# Patient Record
Sex: MALE | Race: WHITE | NOT HISPANIC OR LATINO | Employment: FULL TIME | URBAN - METROPOLITAN AREA
[De-identification: names, ages, dates, MRNs, and addresses within clinical notes are randomized per-mention and may not be internally consistent; named-entity substitution may affect disease eponyms.]

---

## 2017-01-27 ENCOUNTER — HOSPITAL ENCOUNTER (EMERGENCY)
Facility: HOSPITAL | Age: 47
Discharge: HOME | End: 2017-01-28
Attending: EMERGENCY MEDICINE | Admitting: EMERGENCY MEDICINE
Payer: COMMERCIAL

## 2017-01-27 DIAGNOSIS — R10.9 FLANK PAIN: Primary | ICD-10-CM

## 2017-01-27 PROCEDURE — 81002 URINALYSIS NONAUTO W/O SCOPE: CPT | Performed by: EMERGENCY MEDICINE

## 2017-01-27 RX ORDER — SIMVASTATIN 40 MG
40 TABLET ORAL
COMMUNITY
End: 2021-01-29 | Stop reason: SDUPTHER

## 2017-01-27 RX ORDER — KETOROLAC TROMETHAMINE 30 MG/ML
30 INJECTION, SOLUTION INTRAMUSCULAR; INTRAVENOUS ONCE
Status: COMPLETED | OUTPATIENT
Start: 2017-01-28 | End: 2017-01-28

## 2017-01-27 RX ORDER — CHOLECALCIFEROL (VITAMIN D3) 125 MCG
1 CAPSULE ORAL DAILY
COMMUNITY

## 2017-01-27 RX ORDER — ALPRAZOLAM 0.5 MG/1
0.5 TABLET ORAL AS NEEDED
COMMUNITY
End: 2018-04-26

## 2017-01-27 RX ORDER — SERTRALINE HYDROCHLORIDE 100 MG/1
200 TABLET, FILM COATED ORAL DAILY
COMMUNITY
End: 2018-04-26

## 2017-01-28 ENCOUNTER — APPOINTMENT (EMERGENCY)
Dept: RADIOLOGY | Facility: HOSPITAL | Age: 47
End: 2017-01-28
Payer: COMMERCIAL

## 2017-01-28 VITALS
TEMPERATURE: 98 F | HEART RATE: 86 BPM | SYSTOLIC BLOOD PRESSURE: 147 MMHG | OXYGEN SATURATION: 96 % | WEIGHT: 240 LBS | DIASTOLIC BLOOD PRESSURE: 87 MMHG | RESPIRATION RATE: 18 BRPM

## 2017-01-28 LAB
BACTERIA UR QL AUTO: ABNORMAL /HPF
BILIRUB UR QL STRIP: NEGATIVE
CLARITY UR: ABNORMAL
COLOR UR: YELLOW
COLOR, POC: YELLOW
GLUCOSE UR STRIP-MCNC: ABNORMAL MG/DL
HGB UR QL STRIP.AUTO: ABNORMAL
HYALINE CASTS #/AREA URNS LPF: ABNORMAL /LPF
KETONES UR STRIP-MCNC: NEGATIVE MG/DL
LEUKOCYTE ESTERASE UR QL STRIP: NEGATIVE
NITRITE UR QL STRIP: NEGATIVE
NON-SQ EPI CELLS URNS QL MICRO: ABNORMAL /HPF
PH UR STRIP.AUTO: 6.5 [PH] (ref 4.5–8)
PROT UR STRIP-MCNC: ABNORMAL MG/DL
RBC #/AREA URNS AUTO: ABNORMAL /HPF
SP GR UR STRIP.AUTO: 1.02 (ref 1–1.03)
UROBILINOGEN UR QL STRIP.AUTO: 0.2 E.U./DL
WBC #/AREA URNS AUTO: ABNORMAL /HPF

## 2017-01-28 PROCEDURE — 74176 CT ABD & PELVIS W/O CONTRAST: CPT

## 2017-01-28 PROCEDURE — 96372 THER/PROPH/DIAG INJ SC/IM: CPT

## 2017-01-28 PROCEDURE — 99284 EMERGENCY DEPT VISIT MOD MDM: CPT

## 2017-01-28 PROCEDURE — 81001 URINALYSIS AUTO W/SCOPE: CPT

## 2017-01-28 PROCEDURE — 87086 URINE CULTURE/COLONY COUNT: CPT

## 2017-01-28 RX ORDER — HYDROCODONE BITARTRATE AND ACETAMINOPHEN 5; 325 MG/1; MG/1
1 TABLET ORAL EVERY 6 HOURS PRN
Qty: 15 TABLET | Refills: 0 | Status: SHIPPED | OUTPATIENT
Start: 2017-01-28 | End: 2017-02-12

## 2017-01-28 RX ADMIN — KETOROLAC TROMETHAMINE 30 MG: 30 INJECTION, SOLUTION INTRAMUSCULAR at 00:05

## 2017-01-29 LAB — BACTERIA UR CULT: NORMAL

## 2017-02-01 ENCOUNTER — ALLSCRIPTS OFFICE VISIT (OUTPATIENT)
Dept: OTHER | Facility: OTHER | Age: 47
End: 2017-02-01

## 2017-03-20 ENCOUNTER — LAB REQUISITION (OUTPATIENT)
Dept: LAB | Facility: HOSPITAL | Age: 47
End: 2017-03-20
Payer: COMMERCIAL

## 2017-03-20 ENCOUNTER — HOSPITAL ENCOUNTER (OUTPATIENT)
Dept: RADIOLOGY | Facility: HOSPITAL | Age: 47
Discharge: HOME | End: 2017-03-20
Attending: UROLOGY
Payer: COMMERCIAL

## 2017-03-20 ENCOUNTER — ALLSCRIPTS OFFICE VISIT (OUTPATIENT)
Dept: OTHER | Facility: OTHER | Age: 47
End: 2017-03-20

## 2017-03-20 ENCOUNTER — TRANSCRIBE ORDERS (OUTPATIENT)
Dept: ADMINISTRATIVE | Facility: HOSPITAL | Age: 47
End: 2017-03-20

## 2017-03-20 DIAGNOSIS — R10.9 ABDOMINAL PAIN: ICD-10-CM

## 2017-03-20 DIAGNOSIS — N20.0 CALCULUS OF KIDNEY: ICD-10-CM

## 2017-03-20 LAB
CLARITY UR: NORMAL
COLOR UR: YELLOW
GLUCOSE (HISTORICAL): NORMAL
HGB UR QL STRIP.AUTO: NORMAL
KETONES UR STRIP-MCNC: NORMAL MG/DL
LEUKOCYTE ESTERASE UR QL STRIP: NORMAL
NITRITE UR QL STRIP: NORMAL
PH UR STRIP.AUTO: 6 [PH]
PROT UR STRIP-MCNC: NORMAL MG/DL
SP GR UR STRIP.AUTO: 1.03

## 2017-03-20 PROCEDURE — 87086 URINE CULTURE/COLONY COUNT: CPT | Performed by: UROLOGY

## 2017-03-20 PROCEDURE — 74000 HB X-RAY EXAM OF ABDOMEN (SINGLE ANTEROPOSTERIOR VIEW): CPT

## 2017-03-21 LAB — BACTERIA UR CULT: NORMAL

## 2017-04-08 ENCOUNTER — HOSPITAL ENCOUNTER (OUTPATIENT)
Dept: RADIOLOGY | Facility: HOSPITAL | Age: 47
Discharge: HOME | End: 2017-04-08
Attending: UROLOGY
Payer: COMMERCIAL

## 2017-04-08 DIAGNOSIS — N20.0 CALCULUS OF KIDNEY: ICD-10-CM

## 2017-04-08 PROCEDURE — 74000 HB X-RAY EXAM OF ABDOMEN (SINGLE ANTEROPOSTERIOR VIEW): CPT

## 2017-04-11 ENCOUNTER — GENERIC CONVERSION - ENCOUNTER (OUTPATIENT)
Dept: OTHER | Facility: OTHER | Age: 47
End: 2017-04-11

## 2017-04-29 ENCOUNTER — TRANSCRIBE ORDERS (OUTPATIENT)
Dept: LAB | Facility: HOSPITAL | Age: 47
End: 2017-04-29

## 2017-04-29 ENCOUNTER — APPOINTMENT (OUTPATIENT)
Dept: LAB | Facility: HOSPITAL | Age: 47
End: 2017-04-29
Attending: UROLOGY
Payer: COMMERCIAL

## 2017-04-29 DIAGNOSIS — R10.9 ABDOMINAL PAIN: ICD-10-CM

## 2017-04-29 DIAGNOSIS — N20.0 CALCULUS OF KIDNEY: ICD-10-CM

## 2017-04-29 LAB
ANION GAP SERPL CALCULATED.3IONS-SCNC: 8 MMOL/L (ref 4–13)
BASOPHILS # BLD AUTO: 0.03 THOUSANDS/ΜL (ref 0–0.1)
BASOPHILS NFR BLD AUTO: 1 % (ref 0–1)
BUN SERPL-MCNC: 18 MG/DL (ref 5–25)
CALCIUM SERPL-MCNC: 8.7 MG/DL (ref 8.3–10.1)
CHLORIDE SERPL-SCNC: 101 MMOL/L (ref 100–108)
CO2 SERPL-SCNC: 28 MMOL/L (ref 21–32)
CREAT SERPL-MCNC: 0.94 MG/DL (ref 0.6–1.3)
EOSINOPHIL # BLD AUTO: 0.13 THOUSAND/ΜL (ref 0–0.61)
EOSINOPHIL NFR BLD AUTO: 2 % (ref 0–6)
ERYTHROCYTE [DISTWIDTH] IN BLOOD BY AUTOMATED COUNT: 12.8 % (ref 11.6–15.1)
EST. AVERAGE GLUCOSE BLD GHB EST-MCNC: 203 MG/DL
GFR SERPL CREATININE-BSD FRML MDRD: >60 ML/MIN/1.73SQ M
GLUCOSE SERPL-MCNC: 224 MG/DL (ref 65–140)
HBA1C MFR BLD: 8.7 % (ref 4.2–6.3)
HCT VFR BLD AUTO: 37.9 % (ref 36.5–49.3)
HGB BLD-MCNC: 14.1 G/DL (ref 12–17)
LYMPHOCYTES # BLD AUTO: 2.16 THOUSANDS/ΜL (ref 0.6–4.47)
LYMPHOCYTES NFR BLD AUTO: 33 % (ref 14–44)
MCH RBC QN AUTO: 31.7 PG (ref 26.8–34.3)
MCHC RBC AUTO-ENTMCNC: 37.2 G/DL (ref 31.4–37.4)
MCV RBC AUTO: 85 FL (ref 82–98)
MONOCYTES # BLD AUTO: 0.53 THOUSAND/ΜL (ref 0.17–1.22)
MONOCYTES NFR BLD AUTO: 8 % (ref 4–12)
NEUTROPHILS # BLD AUTO: 3.64 THOUSANDS/ΜL (ref 1.85–7.62)
NEUTS SEG NFR BLD AUTO: 56 % (ref 43–75)
NRBC BLD AUTO-RTO: 0 /100 WBCS
PLATELET # BLD AUTO: 213 THOUSANDS/UL (ref 149–390)
PMV BLD AUTO: 10.7 FL (ref 8.9–12.7)
POTASSIUM SERPL-SCNC: 3.4 MMOL/L (ref 3.5–5.3)
RBC # BLD AUTO: 4.45 MILLION/UL (ref 3.88–5.62)
SODIUM SERPL-SCNC: 137 MMOL/L (ref 136–145)
WBC # BLD AUTO: 6.51 THOUSAND/UL (ref 4.31–10.16)

## 2017-04-29 PROCEDURE — 87086 URINE CULTURE/COLONY COUNT: CPT

## 2017-04-29 PROCEDURE — 80048 BASIC METABOLIC PNL TOTAL CA: CPT

## 2017-04-29 PROCEDURE — 85025 COMPLETE CBC W/AUTO DIFF WBC: CPT

## 2017-04-29 PROCEDURE — 36415 COLL VENOUS BLD VENIPUNCTURE: CPT

## 2017-04-29 PROCEDURE — 83036 HEMOGLOBIN GLYCOSYLATED A1C: CPT

## 2017-04-30 LAB — BACTERIA UR CULT: NORMAL

## 2017-05-12 RX ORDER — VALSARTAN AND HYDROCHLOROTHIAZIDE 320; 12.5 MG/1; MG/1
1 TABLET, FILM COATED ORAL DAILY
COMMUNITY
End: 2019-02-18 | Stop reason: SDUPTHER

## 2017-05-12 RX ORDER — AMLODIPINE BESYLATE 5 MG/1
5 TABLET ORAL DAILY
COMMUNITY
End: 2021-01-29 | Stop reason: SDUPTHER

## 2017-05-17 ENCOUNTER — ANESTHESIA EVENT (OUTPATIENT)
Dept: PERIOP | Facility: HOSPITAL | Age: 47
End: 2017-05-17
Payer: COMMERCIAL

## 2017-05-18 ENCOUNTER — ANESTHESIA (OUTPATIENT)
Dept: PERIOP | Facility: HOSPITAL | Age: 47
End: 2017-05-18
Payer: COMMERCIAL

## 2017-05-18 ENCOUNTER — APPOINTMENT (OUTPATIENT)
Dept: RADIOLOGY | Facility: HOSPITAL | Age: 47
End: 2017-05-18
Payer: COMMERCIAL

## 2017-05-18 ENCOUNTER — HOSPITAL ENCOUNTER (OUTPATIENT)
Facility: HOSPITAL | Age: 47
Setting detail: OUTPATIENT SURGERY
Discharge: HOME | End: 2017-05-18
Attending: UROLOGY | Admitting: UROLOGY
Payer: COMMERCIAL

## 2017-05-18 VITALS
DIASTOLIC BLOOD PRESSURE: 106 MMHG | WEIGHT: 245 LBS | HEART RATE: 81 BPM | BODY MASS INDEX: 34.3 KG/M2 | HEIGHT: 71 IN | OXYGEN SATURATION: 96 % | RESPIRATION RATE: 16 BRPM | TEMPERATURE: 97.5 F | SYSTOLIC BLOOD PRESSURE: 172 MMHG

## 2017-05-18 DIAGNOSIS — N20.0 CALCULUS OF KIDNEY: ICD-10-CM

## 2017-05-18 LAB — GLUCOSE SERPL-MCNC: 238 MG/DL (ref 65–140)

## 2017-05-18 PROCEDURE — 82948 REAGENT STRIP/BLOOD GLUCOSE: CPT

## 2017-05-18 PROCEDURE — 74000 HB X-RAY EXAM OF ABDOMEN (SINGLE ANTEROPOSTERIOR VIEW): CPT

## 2017-05-18 RX ORDER — MIDAZOLAM HYDROCHLORIDE 1 MG/ML
INJECTION INTRAMUSCULAR; INTRAVENOUS AS NEEDED
Status: DISCONTINUED | OUTPATIENT
Start: 2017-05-18 | End: 2017-05-18 | Stop reason: SURG

## 2017-05-18 RX ORDER — FENTANYL CITRATE/PF 50 MCG/ML
25 SYRINGE (ML) INJECTION
Status: DISCONTINUED | OUTPATIENT
Start: 2017-05-18 | End: 2017-05-18 | Stop reason: HOSPADM

## 2017-05-18 RX ORDER — LIDOCAINE HYDROCHLORIDE 10 MG/ML
INJECTION, SOLUTION INFILTRATION; PERINEURAL AS NEEDED
Status: DISCONTINUED | OUTPATIENT
Start: 2017-05-18 | End: 2017-05-18 | Stop reason: SURG

## 2017-05-18 RX ORDER — ONDANSETRON 2 MG/ML
INJECTION INTRAMUSCULAR; INTRAVENOUS AS NEEDED
Status: DISCONTINUED | OUTPATIENT
Start: 2017-05-18 | End: 2017-05-18 | Stop reason: SURG

## 2017-05-18 RX ORDER — SODIUM CHLORIDE, SODIUM LACTATE, POTASSIUM CHLORIDE, CALCIUM CHLORIDE 600; 310; 30; 20 MG/100ML; MG/100ML; MG/100ML; MG/100ML
INJECTION, SOLUTION INTRAVENOUS CONTINUOUS PRN
Status: DISCONTINUED | OUTPATIENT
Start: 2017-05-18 | End: 2017-05-18 | Stop reason: SURG

## 2017-05-18 RX ORDER — HYDROCODONE BITARTRATE AND ACETAMINOPHEN 5; 325 MG/1; MG/1
2 TABLET ORAL EVERY 4 HOURS PRN
Status: DISCONTINUED | OUTPATIENT
Start: 2017-05-18 | End: 2017-05-18 | Stop reason: HOSPADM

## 2017-05-18 RX ORDER — HYDROCODONE BITARTRATE AND ACETAMINOPHEN 5; 325 MG/1; MG/1
2 TABLET ORAL EVERY 6 HOURS PRN
Qty: 15 TABLET | Refills: 0 | Status: SHIPPED | OUTPATIENT
Start: 2017-05-18 | End: 2017-05-28

## 2017-05-18 RX ORDER — PROPOFOL 10 MG/ML
INJECTION, EMULSION INTRAVENOUS AS NEEDED
Status: DISCONTINUED | OUTPATIENT
Start: 2017-05-18 | End: 2017-05-18 | Stop reason: SURG

## 2017-05-18 RX ORDER — ONDANSETRON 2 MG/ML
4 INJECTION INTRAMUSCULAR; INTRAVENOUS EVERY 4 HOURS PRN
Status: DISCONTINUED | OUTPATIENT
Start: 2017-05-18 | End: 2017-05-18 | Stop reason: HOSPADM

## 2017-05-18 RX ORDER — CIPROFLOXACIN 500 MG/1
500 TABLET, FILM COATED ORAL 2 TIMES DAILY
Qty: 6 TABLET | Refills: 0 | Status: SHIPPED | OUTPATIENT
Start: 2017-05-18 | End: 2017-05-21

## 2017-05-18 RX ORDER — LABETALOL HYDROCHLORIDE 5 MG/ML
10 INJECTION, SOLUTION INTRAVENOUS ONCE
Status: COMPLETED | OUTPATIENT
Start: 2017-05-18 | End: 2017-05-18

## 2017-05-18 RX ORDER — FENTANYL CITRATE 50 UG/ML
INJECTION, SOLUTION INTRAMUSCULAR; INTRAVENOUS AS NEEDED
Status: DISCONTINUED | OUTPATIENT
Start: 2017-05-18 | End: 2017-05-18 | Stop reason: SURG

## 2017-05-18 RX ADMIN — SODIUM CHLORIDE, SODIUM LACTATE, POTASSIUM CHLORIDE, AND CALCIUM CHLORIDE: .6; .31; .03; .02 INJECTION, SOLUTION INTRAVENOUS at 08:03

## 2017-05-18 RX ADMIN — CEFAZOLIN SODIUM 2000 MG: 2 SOLUTION INTRAVENOUS at 08:03

## 2017-05-18 RX ADMIN — MIDAZOLAM HYDROCHLORIDE 2 MG: 1 INJECTION, SOLUTION INTRAMUSCULAR; INTRAVENOUS at 08:03

## 2017-05-18 RX ADMIN — PROPOFOL 200 MG: 10 INJECTION, EMULSION INTRAVENOUS at 08:08

## 2017-05-18 RX ADMIN — ONDANSETRON 4 MG: 2 INJECTION INTRAMUSCULAR; INTRAVENOUS at 08:12

## 2017-05-18 RX ADMIN — FENTANYL CITRATE 100 MCG: 50 INJECTION INTRAMUSCULAR; INTRAVENOUS at 08:03

## 2017-05-18 RX ADMIN — LABETALOL HYDROCHLORIDE 10 MG: 5 INJECTION, SOLUTION INTRAVENOUS at 09:46

## 2017-05-18 RX ADMIN — LIDOCAINE HYDROCHLORIDE 50 MG: 10 INJECTION, SOLUTION INFILTRATION; PERINEURAL at 08:08

## 2017-05-19 ENCOUNTER — APPOINTMENT (EMERGENCY)
Dept: RADIOLOGY | Facility: HOSPITAL | Age: 47
End: 2017-05-19
Payer: COMMERCIAL

## 2017-05-19 ENCOUNTER — HOSPITAL ENCOUNTER (EMERGENCY)
Facility: HOSPITAL | Age: 47
Discharge: HOME | End: 2017-05-19
Attending: EMERGENCY MEDICINE | Admitting: EMERGENCY MEDICINE
Payer: COMMERCIAL

## 2017-05-19 VITALS
TEMPERATURE: 98.3 F | OXYGEN SATURATION: 94 % | BODY MASS INDEX: 34.3 KG/M2 | SYSTOLIC BLOOD PRESSURE: 154 MMHG | DIASTOLIC BLOOD PRESSURE: 90 MMHG | WEIGHT: 245 LBS | HEIGHT: 71 IN | HEART RATE: 94 BPM | RESPIRATION RATE: 18 BRPM

## 2017-05-19 DIAGNOSIS — R10.9 ACUTE LEFT FLANK PAIN: ICD-10-CM

## 2017-05-19 DIAGNOSIS — N20.1 URETERAL CALCULUS, LEFT: Primary | ICD-10-CM

## 2017-05-19 LAB
ALBUMIN SERPL BCP-MCNC: 3.9 G/DL (ref 3.5–5)
ALP SERPL-CCNC: 84 U/L (ref 46–116)
ALT SERPL W P-5'-P-CCNC: 41 U/L (ref 12–78)
ANION GAP SERPL CALCULATED.3IONS-SCNC: 9 MMOL/L (ref 4–13)
AST SERPL W P-5'-P-CCNC: 19 U/L (ref 5–45)
BACTERIA UR QL AUTO: ABNORMAL /HPF
BASOPHILS # BLD AUTO: 0.03 THOUSANDS/ΜL (ref 0–0.1)
BASOPHILS NFR BLD AUTO: 0 % (ref 0–1)
BILIRUB SERPL-MCNC: 1.34 MG/DL (ref 0.2–1)
BILIRUB UR QL STRIP: NEGATIVE
BUN SERPL-MCNC: 15 MG/DL (ref 5–25)
CALCIUM SERPL-MCNC: 8.7 MG/DL (ref 8.3–10.1)
CHLORIDE SERPL-SCNC: 103 MMOL/L (ref 100–108)
CLARITY UR: CLEAR
CO2 SERPL-SCNC: 27 MMOL/L (ref 21–32)
COLOR UR: ABNORMAL
COLOR, POC: NORMAL
CREAT SERPL-MCNC: 1.14 MG/DL (ref 0.6–1.3)
EOSINOPHIL # BLD AUTO: 0.16 THOUSAND/ΜL (ref 0–0.61)
EOSINOPHIL NFR BLD AUTO: 1 % (ref 0–6)
ERYTHROCYTE [DISTWIDTH] IN BLOOD BY AUTOMATED COUNT: 13 % (ref 11.6–15.1)
GFR SERPL CREATININE-BSD FRML MDRD: >60 ML/MIN/1.73SQ M
GLUCOSE SERPL-MCNC: 261 MG/DL (ref 65–140)
GLUCOSE UR STRIP-MCNC: ABNORMAL MG/DL
HCT VFR BLD AUTO: 37.4 % (ref 36.5–49.3)
HGB BLD-MCNC: 13.6 G/DL (ref 12–17)
HGB UR QL STRIP.AUTO: ABNORMAL
KETONES UR STRIP-MCNC: NEGATIVE MG/DL
LEUKOCYTE ESTERASE UR QL STRIP: ABNORMAL
LIPASE SERPL-CCNC: 121 U/L (ref 73–393)
LYMPHOCYTES # BLD AUTO: 1.65 THOUSANDS/ΜL (ref 0.6–4.47)
LYMPHOCYTES NFR BLD AUTO: 14 % (ref 14–44)
MCH RBC QN AUTO: 31.6 PG (ref 26.8–34.3)
MCHC RBC AUTO-ENTMCNC: 36.4 G/DL (ref 31.4–37.4)
MCV RBC AUTO: 87 FL (ref 82–98)
MONOCYTES # BLD AUTO: 0.95 THOUSAND/ΜL (ref 0.17–1.22)
MONOCYTES NFR BLD AUTO: 8 % (ref 4–12)
NEUTROPHILS # BLD AUTO: 8.64 THOUSANDS/ΜL (ref 1.85–7.62)
NEUTS SEG NFR BLD AUTO: 77 % (ref 43–75)
NITRITE UR QL STRIP: NEGATIVE
NON-SQ EPI CELLS URNS QL MICRO: ABNORMAL /HPF
NRBC BLD AUTO-RTO: 0 /100 WBCS
PH UR STRIP.AUTO: 6.5 [PH] (ref 4.5–8)
PLATELET # BLD AUTO: 195 THOUSANDS/UL (ref 149–390)
PMV BLD AUTO: 10.8 FL (ref 8.9–12.7)
POTASSIUM SERPL-SCNC: 3.3 MMOL/L (ref 3.5–5.3)
PROT SERPL-MCNC: 7.5 G/DL (ref 6.4–8.2)
PROT UR STRIP-MCNC: ABNORMAL MG/DL
RBC # BLD AUTO: 4.31 MILLION/UL (ref 3.88–5.62)
RBC #/AREA URNS AUTO: ABNORMAL /HPF
SODIUM SERPL-SCNC: 139 MMOL/L (ref 136–145)
SP GR UR STRIP.AUTO: 1.02 (ref 1–1.03)
UROBILINOGEN UR QL STRIP.AUTO: 0.2 E.U./DL
WBC # BLD AUTO: 11.47 THOUSAND/UL (ref 4.31–10.16)
WBC #/AREA URNS AUTO: ABNORMAL /HPF

## 2017-05-19 PROCEDURE — 99284 EMERGENCY DEPT VISIT MOD MDM: CPT

## 2017-05-19 PROCEDURE — 96375 TX/PRO/DX INJ NEW DRUG ADDON: CPT

## 2017-05-19 PROCEDURE — 81001 URINALYSIS AUTO W/SCOPE: CPT

## 2017-05-19 PROCEDURE — 74177 CT ABD & PELVIS W/CONTRAST: CPT

## 2017-05-19 PROCEDURE — 36415 COLL VENOUS BLD VENIPUNCTURE: CPT

## 2017-05-19 PROCEDURE — 80053 COMPREHEN METABOLIC PANEL: CPT

## 2017-05-19 PROCEDURE — 96374 THER/PROPH/DIAG INJ IV PUSH: CPT

## 2017-05-19 PROCEDURE — 81002 URINALYSIS NONAUTO W/O SCOPE: CPT

## 2017-05-19 PROCEDURE — 96361 HYDRATE IV INFUSION ADD-ON: CPT

## 2017-05-19 PROCEDURE — 87086 URINE CULTURE/COLONY COUNT: CPT

## 2017-05-19 PROCEDURE — 85025 COMPLETE CBC W/AUTO DIFF WBC: CPT

## 2017-05-19 PROCEDURE — 83690 ASSAY OF LIPASE: CPT

## 2017-05-19 RX ORDER — MORPHINE SULFATE 10 MG/ML
10 INJECTION, SOLUTION INTRAMUSCULAR; INTRAVENOUS ONCE
Status: COMPLETED | OUTPATIENT
Start: 2017-05-19 | End: 2017-05-19

## 2017-05-19 RX ORDER — TAMSULOSIN HYDROCHLORIDE 0.4 MG/1
0.4 CAPSULE ORAL
Qty: 14 CAPSULE | Refills: 0 | Status: SHIPPED | OUTPATIENT
Start: 2017-05-19 | End: 2017-06-02

## 2017-05-19 RX ORDER — OXYCODONE HYDROCHLORIDE AND ACETAMINOPHEN 5; 325 MG/1; MG/1
1 TABLET ORAL EVERY 4 HOURS PRN
Qty: 12 TABLET | Refills: 0 | Status: SHIPPED | OUTPATIENT
Start: 2017-05-19 | End: 2018-11-15

## 2017-05-19 RX ORDER — IBUPROFEN 600 MG/1
600 TABLET ORAL EVERY 6 HOURS PRN
Qty: 40 TABLET | Refills: 0 | Status: SHIPPED | OUTPATIENT
Start: 2017-05-19 | End: 2018-11-15

## 2017-05-19 RX ORDER — KETOROLAC TROMETHAMINE 30 MG/ML
15 INJECTION, SOLUTION INTRAMUSCULAR; INTRAVENOUS ONCE
Status: COMPLETED | OUTPATIENT
Start: 2017-05-19 | End: 2017-05-19

## 2017-05-19 RX ADMIN — IOHEXOL 100 ML: 350 INJECTION, SOLUTION INTRAVENOUS at 08:29

## 2017-05-19 RX ADMIN — SODIUM CHLORIDE 1000 ML: 0.9 INJECTION, SOLUTION INTRAVENOUS at 06:48

## 2017-05-19 RX ADMIN — KETOROLAC TROMETHAMINE 15 MG: 30 INJECTION, SOLUTION INTRAMUSCULAR at 06:47

## 2017-05-19 RX ADMIN — MORPHINE SULFATE 10 MG: 10 INJECTION, SOLUTION INTRAMUSCULAR; INTRAVENOUS at 06:47

## 2017-05-20 LAB — BACTERIA UR CULT: NORMAL

## 2017-05-23 ENCOUNTER — ANESTHESIA (OUTPATIENT)
Dept: PERIOP | Facility: HOSPITAL | Age: 47
End: 2017-05-23
Payer: COMMERCIAL

## 2017-05-23 ENCOUNTER — APPOINTMENT (OUTPATIENT)
Dept: RADIOLOGY | Facility: HOSPITAL | Age: 47
End: 2017-05-23
Payer: COMMERCIAL

## 2017-05-23 ENCOUNTER — HOSPITAL ENCOUNTER (OUTPATIENT)
Facility: HOSPITAL | Age: 47
Setting detail: OUTPATIENT SURGERY
Discharge: HOME | End: 2017-05-23
Attending: UROLOGY | Admitting: UROLOGY
Payer: COMMERCIAL

## 2017-05-23 ENCOUNTER — ANESTHESIA EVENT (OUTPATIENT)
Dept: PERIOP | Facility: HOSPITAL | Age: 47
End: 2017-05-23
Payer: COMMERCIAL

## 2017-05-23 VITALS
HEIGHT: 67 IN | OXYGEN SATURATION: 96 % | BODY MASS INDEX: 38.45 KG/M2 | WEIGHT: 245 LBS | SYSTOLIC BLOOD PRESSURE: 167 MMHG | HEART RATE: 86 BPM | DIASTOLIC BLOOD PRESSURE: 80 MMHG | TEMPERATURE: 100.1 F | RESPIRATION RATE: 16 BRPM

## 2017-05-23 PROCEDURE — A9270 NON-COVERED ITEM OR SERVICE: HCPCS | Performed by: UROLOGY

## 2017-05-23 PROCEDURE — 74000 HB X-RAY EXAM OF ABDOMEN (SINGLE ANTEROPOSTERIOR VIEW): CPT

## 2017-05-23 PROCEDURE — C1769 GUIDE WIRE: HCPCS | Performed by: UROLOGY

## 2017-05-23 PROCEDURE — C1758 CATHETER, URETERAL: HCPCS | Performed by: UROLOGY

## 2017-05-23 PROCEDURE — 74420 UROGRAPHY RTRGR +-KUB: CPT

## 2017-05-23 PROCEDURE — C2617 STENT, NON-COR, TEM W/O DEL: HCPCS | Performed by: UROLOGY

## 2017-05-23 DEVICE — STENT URETERAL 6FR 24CML INLAY OPTIMA: Type: IMPLANTABLE DEVICE | Status: FUNCTIONAL

## 2017-05-23 RX ORDER — OXYCODONE HYDROCHLORIDE 5 MG/1
5 TABLET ORAL EVERY 4 HOURS PRN
Status: DISCONTINUED | OUTPATIENT
Start: 2017-05-23 | End: 2017-05-23 | Stop reason: HOSPADM

## 2017-05-23 RX ORDER — SODIUM CHLORIDE, SODIUM LACTATE, POTASSIUM CHLORIDE, CALCIUM CHLORIDE 600; 310; 30; 20 MG/100ML; MG/100ML; MG/100ML; MG/100ML
100 INJECTION, SOLUTION INTRAVENOUS CONTINUOUS
Status: DISCONTINUED | OUTPATIENT
Start: 2017-05-23 | End: 2017-05-23 | Stop reason: HOSPADM

## 2017-05-23 RX ORDER — HYDROCODONE BITARTRATE AND ACETAMINOPHEN 5; 325 MG/1; MG/1
1 TABLET ORAL EVERY 6 HOURS PRN
Qty: 10 TABLET | Refills: 0 | Status: SHIPPED | OUTPATIENT
Start: 2017-05-23 | End: 2017-06-02

## 2017-05-23 RX ORDER — LIDOCAINE HYDROCHLORIDE 10 MG/ML
INJECTION, SOLUTION INFILTRATION; PERINEURAL AS NEEDED
Status: DISCONTINUED | OUTPATIENT
Start: 2017-05-23 | End: 2017-05-23 | Stop reason: SURG

## 2017-05-23 RX ORDER — PROPOFOL 10 MG/ML
INJECTION, EMULSION INTRAVENOUS AS NEEDED
Status: DISCONTINUED | OUTPATIENT
Start: 2017-05-23 | End: 2017-05-23 | Stop reason: SURG

## 2017-05-23 RX ORDER — ONDANSETRON 2 MG/ML
4 INJECTION INTRAMUSCULAR; INTRAVENOUS EVERY 4 HOURS PRN
Status: DISCONTINUED | OUTPATIENT
Start: 2017-05-23 | End: 2017-05-23 | Stop reason: HOSPADM

## 2017-05-23 RX ORDER — ONDANSETRON 2 MG/ML
INJECTION INTRAMUSCULAR; INTRAVENOUS AS NEEDED
Status: DISCONTINUED | OUTPATIENT
Start: 2017-05-23 | End: 2017-05-23 | Stop reason: SURG

## 2017-05-23 RX ORDER — MAGNESIUM HYDROXIDE 1200 MG/15ML
LIQUID ORAL AS NEEDED
Status: DISCONTINUED | OUTPATIENT
Start: 2017-05-23 | End: 2017-05-23 | Stop reason: HOSPADM

## 2017-05-23 RX ORDER — FENTANYL CITRATE 50 UG/ML
INJECTION, SOLUTION INTRAMUSCULAR; INTRAVENOUS AS NEEDED
Status: DISCONTINUED | OUTPATIENT
Start: 2017-05-23 | End: 2017-05-23 | Stop reason: SURG

## 2017-05-23 RX ORDER — MIDAZOLAM HYDROCHLORIDE 1 MG/ML
INJECTION INTRAMUSCULAR; INTRAVENOUS AS NEEDED
Status: DISCONTINUED | OUTPATIENT
Start: 2017-05-23 | End: 2017-05-23 | Stop reason: SURG

## 2017-05-23 RX ORDER — LABETALOL HYDROCHLORIDE 5 MG/ML
INJECTION, SOLUTION INTRAVENOUS AS NEEDED
Status: DISCONTINUED | OUTPATIENT
Start: 2017-05-23 | End: 2017-05-23 | Stop reason: SURG

## 2017-05-23 RX ORDER — FENTANYL CITRATE/PF 50 MCG/ML
25 SYRINGE (ML) INJECTION
Status: DISCONTINUED | OUTPATIENT
Start: 2017-05-23 | End: 2017-05-23 | Stop reason: HOSPADM

## 2017-05-23 RX ORDER — SODIUM CHLORIDE, SODIUM LACTATE, POTASSIUM CHLORIDE, CALCIUM CHLORIDE 600; 310; 30; 20 MG/100ML; MG/100ML; MG/100ML; MG/100ML
INJECTION, SOLUTION INTRAVENOUS CONTINUOUS PRN
Status: DISCONTINUED | OUTPATIENT
Start: 2017-05-23 | End: 2017-05-23 | Stop reason: SURG

## 2017-05-23 RX ORDER — PHENAZOPYRIDINE HYDROCHLORIDE 200 MG/1
200 TABLET, FILM COATED ORAL 3 TIMES DAILY PRN
Qty: 10 TABLET | Refills: 0 | Status: SHIPPED | OUTPATIENT
Start: 2017-05-23 | End: 2017-05-26

## 2017-05-23 RX ORDER — OXYCODONE HYDROCHLORIDE 10 MG/1
10 TABLET ORAL EVERY 4 HOURS PRN
Status: DISCONTINUED | OUTPATIENT
Start: 2017-05-23 | End: 2017-05-23 | Stop reason: HOSPADM

## 2017-05-23 RX ADMIN — LIDOCAINE HYDROCHLORIDE 100 MG: 10 INJECTION, SOLUTION INFILTRATION; PERINEURAL at 12:32

## 2017-05-23 RX ADMIN — PROPOFOL 200 MG: 10 INJECTION, EMULSION INTRAVENOUS at 12:32

## 2017-05-23 RX ADMIN — MIDAZOLAM HYDROCHLORIDE 2 MG: 1 INJECTION, SOLUTION INTRAMUSCULAR; INTRAVENOUS at 12:25

## 2017-05-23 RX ADMIN — OXYCODONE HYDROCHLORIDE 5 MG: 5 TABLET ORAL at 13:38

## 2017-05-23 RX ADMIN — FENTANYL CITRATE 50 MCG: 50 INJECTION INTRAMUSCULAR; INTRAVENOUS at 12:46

## 2017-05-23 RX ADMIN — SODIUM CHLORIDE, SODIUM LACTATE, POTASSIUM CHLORIDE, AND CALCIUM CHLORIDE: .6; .31; .03; .02 INJECTION, SOLUTION INTRAVENOUS at 11:45

## 2017-05-23 RX ADMIN — CEFAZOLIN SODIUM 2000 MG: 2 SOLUTION INTRAVENOUS at 12:36

## 2017-05-23 RX ADMIN — LABETALOL HYDROCHLORIDE 5 MG: 5 INJECTION, SOLUTION INTRAVENOUS at 12:46

## 2017-05-23 RX ADMIN — FENTANYL CITRATE 50 MCG: 50 INJECTION INTRAMUSCULAR; INTRAVENOUS at 12:36

## 2017-05-23 RX ADMIN — ONDANSETRON 4 MG: 2 INJECTION INTRAMUSCULAR; INTRAVENOUS at 12:37

## 2017-05-23 RX ADMIN — DEXAMETHASONE SODIUM PHOSPHATE 10 MG: 10 INJECTION INTRAMUSCULAR; INTRAVENOUS at 12:37

## 2017-05-30 ENCOUNTER — GENERIC CONVERSION - ENCOUNTER (OUTPATIENT)
Dept: OTHER | Facility: OTHER | Age: 47
End: 2017-05-30

## 2017-08-26 ENCOUNTER — HOSPITAL ENCOUNTER (OUTPATIENT)
Dept: RADIOLOGY | Facility: HOSPITAL | Age: 47
Discharge: HOME | End: 2017-08-26
Attending: UROLOGY
Payer: COMMERCIAL

## 2017-08-26 ENCOUNTER — TRANSCRIBE ORDERS (OUTPATIENT)
Dept: ADMINISTRATIVE | Facility: HOSPITAL | Age: 47
End: 2017-08-26

## 2017-08-26 DIAGNOSIS — N20.0 CALCULUS OF KIDNEY: ICD-10-CM

## 2017-08-26 PROCEDURE — 74000 HB X-RAY EXAM OF ABDOMEN (SINGLE ANTEROPOSTERIOR VIEW): CPT

## 2018-01-09 NOTE — PROGRESS NOTES
Preliminary Nursing Report                Patient Information    Initial Encounter Entry Date:   2017 2:31 PM EST (Automated Transmission Automated Transmission)       Last Modified:   {Cedric Alejandra}              Legal Name: Srujit [address] Number:        YOB: 1970        Age (years): 50        Gender: M        Body Mass Index (BMI): 35 kg/m2        Height: 70 in  Weight: 244 lbs (111 kgs)           Address:   [address] [place] [id number] [address] [facility]              Phone: [phone]   (consent to leave messages)        Email:        Ethnicity: Decline to State        Evangelical:        Marital Status:        Preferred Language: English        Race: Other Race                    Patient Insurance Information        Primary Insurance Information Carrier Name: {Primary  CarrierName}           Carrier Address:   {Primary  CarrierAddress}              Carrier Phone: {Primary  CarrierPhone}          Group Number: {Primary  GroupNumber}          Policy Number: {Primary  PolicyNumber}          Insured Name: {Primary  InsuredName}          Insured : {Primary  InsuredDOB}          Relationship to Insured: {Primary  RelationshiptoInsured}           Secondary Insurance Information Carrier Name: {Secondary  CarrierName}           Carrier Address:   {Secondary  CarrierAddress}              Carrier Phone: {Secondary  CarrierPhone}          Group Number: {Secondary  GroupNumber}          Policy Number: {Secondary  PolicyNumber}          Insured Name: {Secondary  InsuredName}          Insured : {Secondary  InsuredDOB}          Relationship to Insured: {Secondary  RelationshiptoInsured}                       Health Profile   Booking #:   Lorena Larsen #: [id number]               DOS: 2017    Surgery : LITHOTRIPSY, EXTRACORPOREAL SHOCK WAVE    Add'l Procedures/Notes:     Surgery Risk: Intermediate          Precautions     Diabetic peripheral neuropathy Allergies    No Known Drug Allergies             Medications    ALPRAZolam 0 25 MG Oral Tablet       AmLODIPine Besylate 10 MG Oral Tablet       Aspirin 81 MG TABS       Fish Oil 1000 MG Oral Capsule       Gabapentin 300 MG Oral Capsule       Invokana 300 MG Oral Tablet       Janumet XR  MG Oral Tablet Extended Release 24 Hour       Oxycodone-Acetaminophen 5-325 MG Oral Tablet       Sertraline HCl - 100 MG Oral Tablet       Simvastatin 40 MG Oral Tablet       Tamsulosin HCl - 0 4 MG Oral Capsule       Valsartan-Hydrochlorothiazide 320-12 5 MG Oral Tablet       Vitamin D3 2000 UNIT Oral Capsule               Conditions    Acute sinusitis       Anxiety       Diabetic peripheral neuropathy       Encounter for prostate cancer screening       Flank pain       Hyperlipidemia       Hypertension       Kidney stone on left side       Obesity               Family History    None             Surgical History    None             Social History    Never smoked       Never smoker                               Patient Instructions       Medical Procedure Risk  NPO Instructions   The day before surgery it is recommended to have a light dinner at your usual time and you are allowed a light snack early in the evening  Do not eat anything heavy or eat a big meal after 7pm  Do not eat or drink anything after midnight prior to your surgery  If you are supposed to take any of your medications, do so with a sip of water  Failure to follow these instructions can lead to an increased risk of lung complications and may result in a delay or cancellation of your procedure  If you have any questions, contact your institution for further instructions  No candy, no gum, no mints, no chewing tobacco          Valsartan-Hydrochlorothiazide 320-12 5 MG Oral Tablet  Medication Instruction (ACE/ARB - Blood Pressure Medication) 1  Please continue the following medications up to the evening before surgery, but do not take it on the day of surgery  Please restart your medications as soon as clinically feasible  Aspirin 81 MG TABS  Medication Instruction (Aspirin - Blood Thinners) 112, 104, 105, 114, 113, 103, 110, 107, 108, 109, 111, 106  Please continue to take this medication on your normal schedule  If this is an oral medication and you take in the morning, you may do so with a sip of water  However, your surgeon may have you stop aspirin up to a week early if you are having intracranial, middle ear, posterior eye, spine surgery or prostate surgery  ALPRAZolam 0 25 MG Oral Tablet  Medication Instruction (Benzodiazepine) 15  Please continue the following medications, if needed, up to and including the day of surgery (with a sip of water)  Diabetic peripheral neuropathy  Medication Instruction (Diabetic Medication)   Please decrease your morning insulin dose to one-half of normal dose  If you are taking oral diabetes medications, the morning dose should be omitted  If taking metformin (Glucophage), discontinue the medication for 24 hours prior to surgery  If you have an insulin pump, continue at a basal rate only  AmLODIPine Besylate 10 MG Oral Tablet  Calcium Blocker (Blood Pressure Medication) 3, 4, 6, 5, 2  Please continue to take this medication on your normal schedule  If this is an oral medication and you take in the morning, you may do so with a sip of water  Gabapentin 300 MG Oral Capsule  Medication Instruction (Neurological Medication) 8  Please continue to take this medication on your normal schedule  If this is an oral medication and you take in the morning, you may do so with a sip of water  Oxycodone-Acetaminophen 5-325 MG Oral Tablet  Medication Instruction (Opioids - Pain Medication) 62  Please continue the following medications, if needed, up to and including the day of surgery (with a sip of water)           Sertraline HCl - 100 MG Oral Tablet  Medication Instruction (SSRI - Antidepressants) 80  Please continue to take this medication on your normal schedule  If this is an oral medication and you take in the morning, you may do so with a sip of water  Simvastatin 40 MG Oral Tablet  Medication Instruction (Cholesterol Medication) 81, 82  Please continue to take this medication on your normal schedule  If this is an oral medication and you take in the morning, you may do so with a sip of water  Testing Considerations       ? Basic Metabolic Panel (BMP) t  If test was completed and normal within last six months, repeat test is not necessary  Triggered by: Diabetic peripheral neuropathy, Hypertension         ? Blood Glucose on Day of Surgery t  Please check the blood sugar on the morning of surgery  Triggered by: Diabetic peripheral neuropathy         ? Complete Blood Count (CBC) t  If test was completed and normal within last six months, repeat test is not necessary  Triggered by: Acute sinusitis         ? Electrocardiogram (ECG) t  Patient does not need new test if normal ECG is present within the last six months and no change in clinical condition  Triggered by: Diabetic peripheral neuropathy, Hypertension         ? Hemoglobin A1c (HbA1c) client  If test was completed and normal within the last three months, repeat test is not necessary  Triggered by: Diabetic peripheral neuropathy, Age or Facility Rec               Consultations       ? Primary Care Physician Evaluation   Primary care physician may need to evaluate patient prior to surgery  This is likely NOT necessary if the listed conditions are chronic and stable  Triggered by: Kidney stone on left side               Miscellaneous Questions         Question: Are you able to walk up a flight of stairs, walk up a hill or do heavy housework WITHOUT having chest pain or shortness of breath?        Answer: YES                   Allergies/Conditions/Medications Not Found        The following were not recognized by our system when generating the recommendations  Please consider if this would impact any preoperative protocols  ? Never smoked       ? Never smoker       ? Fish Oil 1000 MG Oral Capsule       ? Invokana 300 MG Oral Tablet       ? Janumet XR  MG Oral Tablet Extended Release 24 Hour                  Appointment Information         Date:    05/23/2017        Location:    [facility]        Address:           Directions:                      Footnotes revision 14      ?? Denotes a free-text entry  Legal Disclaimer: Any and all recommendations and services provided herein are designed to assist in the preoperative care of the patient  Nothing contained herein is designed to replace, eliminate or alleviate the responsibility of the attending physician to supervise and determine the patient?s preoperative care and course of treatment  Failure to provide complete, accurate information may negatively impact the system?s ability to recommend the proper preoperative protocol  THE ATTENDING PHYSICIAN IS RESPONSIBLE TO REVIEW THE SUGGESTED PREOPERATIVE PROTOCOLS/COURSE OF TREATMENT AND PRESCRIBE THE FINAL COURSE OF PREOPERATIVE TREATMENT IN CONSULTATION WITH THE PATIENT  THE ePREOP SYSTEM AND ITS MATERIALS ARE PROVIDED ? AS IS? WITHOUT WARRANTY OF ANY KIND, EXPRESS OR IMPLIED, INCLUDING, BUT NOT LIMITED TO, WARRANTIES OF PERFORMANCE OR MERCHANTABILITY OR FITNESS FOR A PARTICULAR PURPOSE  PATIENT AND PHYSICIANS HEREBY AGREE THAT THEIR USE OF THE MATERIALS AND RESOURCES ACT AS A CONSENT TO RELEASE AND WAIVE ePREOP FROM ANY AND ALL CLAIMS OF WARRANTY, TORT OR CONTRACT LAW OF ANY KIND  Electronically signed Samantha UNNEZ    Apr 18 2017 11:51AM EST

## 2018-01-10 NOTE — CONSULTS
Assessment    1  Kidney stone on left side (592 0) (N20 0)    Plan  Flank pain, Kidney stone on left side    · (1) URINE CULTURE; Source:Urine, Clean Catch; Status:Active - Retrospective By  Protocol Authorization; Requested for:20Mar2017;    Perform:[facility]; Due:20Mar2018; Last Updated Jagdish Reyna; 3/20/2017 2:36:18 PM;Ordered; For:Flank pain, Kidney stone on left side; Ordered By:Daryl Howard;  Kidney stone on left side    · * XR ABDOMEN 1 VIEW KUB; Status:Active; Requested for:20Mar2017;    Perform:[facility]; OBE:[id number]; Ordered; For:Kidney stone on left side; Ordered By:Daryl Howard;   · Urine Dip Non-Automated- POC; Status:Complete - Retrospective By Protocol  Authorization;   Done: [id number] 01:49PM   Performed: In Office; [phone]; Last Updated Jennifer Villagomez; 3/20/2017 1:50:28 PM;Ordered; For:Kidney stone on left side; Ordered By:Daryl Howard;   · Schedule Surgery Treatment  Procedure: Left ESWL  Standard PATs  Status: Hold For -  Scheduling  Requested for: [id number]   Ordered; For: Kidney stone on left side; Ordered By: Yovany Garcias Performed:  Due: 78OSO2768    Discussion/Summary  Discussion Summary:   41-year-old male with left renal calculi and lower urinary tract symptoms  I reviewed the imaging results with the patient  He has a large renal pelvis stone  We discussed treatment options including ESWL and ureteroscopy with laser lithotripsy  He would like to proceed with left ESWL  Risks and benefits were discussed and he was consented  We will schedule this in the near future  In regards with his urinary symptoms I will trial him on Vesicare  5 mg samples were given  Side effects were discussed  We will reevaluate his symptoms at the time of surgery  Chief Complaint  Chief Complaint Free Text Note Form: kidney stones      History of Present Illness  HPI: 41-year-old male presents for evaluation of nephrolithiasis   The patient has an extensive history of kidney stones and has had numerous procedures  He states that in late January he began having left-sided abdominal pain  CAT scan demonstrated a 1 7 cm left renal pelvis stone  The patient has been having intermittent pain since then  He denies any gross hematuria, fevers, or chills  He also has significant lower urinary tract symptoms and was started on tamsulosin and mid February  He has noticed improvement of his stream since then but continues to have urinary urgency, frequency and nocturia  Review of Systems  Complete-Male Urology:   Constitutional: No fever or chills, feels well, no tiredness, no recent weight gain or weight loss  Respiratory: No complaints of shortness of breath, no wheezing, no cough, no SOB on exertion, no orthopnea or PND  Cardiovascular: No complaints of slow heart rate, no fast heart rate, no chest pain, no palpitations, no leg claudication, no lower extremity  Gastrointestinal: No complaints of abdominal pain, no constipation, no nausea or vomiting, no diarrhea or bloody stools  Genitourinary: Empty sensation, feelings of urinary urgency, stream quality good, urinary stream starts and stops, but no dysuria, no urinary hesitancy, no hematuria and no incontinence    The patient presents with complaints of nocturia (2-3 times)  Musculoskeletal: No complaints of arthralgia, no myalgias, no joint swelling or stiffness, no limb pain or swelling  Integumentary: No complaints of skin rash or skin lesions, no itching, no skin wound, no dry skin  Hematologic/Lymphatic: No complaints of swollen glands, no swollen glands in the neck, does not bleed easily, no easy bruising  Neurological: No compliants of headache, no confusion, no convulsions, no numbness or tingling, no dizziness or fainting, no limb weakness, no difficulty walking  ROS Reviewed:   ROS reviewed  Active Problems    1  Acute sinusitis (461 9) (J01 90)   2  Anxiety (300 00) (F41 9)   3  Diabetes mellitus (250 00) (E11 9)   4  Diabetic peripheral neuropathy (250 60,357 2) (E11 42)   5  Encounter for prostate cancer screening (V76 44) (Z12 5)   6  Flank pain (789 09) (R10 9)   7  Hyperlipidemia (272 4) (E78 5)   8  Hypertension (401 9) (I10)   9  Kidney stone on left side (592 0) (N20 0)   10  Obesity (278 00) (E66 9)    Past Medical History    1  History of hypertension (V12 59) (Z86 79)   2  History of Kidney stones (592 0) (N20 0)  Active Problems And Past Medical History Reviewed: The active problems and past medical history were reviewed and updated today  Surgical History  Surgical History Reviewed: The surgical history was reviewed and updated today  Family History  Mother    1  Family history of diabetes mellitus (V18 0) (Z83 3)  Father    2  Family history of    3  Family history of diabetes mellitus (V18 0) (Z83 3)   4  Family history of hypertension (V17 49) (Z82 49)  Maternal Grandmother    5  Family history of malignant neoplasm of breast (V16 3) (Z80 3)  Family History    6  Denied: Family history of colon cancer   7  Denied: Family history of Prostate cancer  Family History Reviewed: The family history was reviewed and updated today  Social History    · Never smoked   · Never smoker  Social History Reviewed: The social history was reviewed and updated today  Current Meds   1  Accu-Chek Abigail Device; USE AS DIRECTED; Therapy: 84VBY7817 to (Last Rx:2015)  Requested for: 85LTN4723 Ordered   2  ALPRAZolam 0 25 MG Oral Tablet; TAKE 1 TABLET 3 TIMES DAILY AS NEEDED; Therapy: 64YHP2324 to (Evaluate:2017); Last Rx:17Syi8534 Ordered   3  AmLODIPine Besylate 10 MG Oral Tablet; TAKE 1 TABLET DAILY; Therapy: 82SLA4296 to (Evaluate:2018)  Requested for: ; Last   Rx:66Aoc6640 Ordered   4  Aspirin 81 MG TABS; TAKE 1 TABLET DAILY; Therapy: (Recorded:53Giy9293) to Recorded   5   Fish Oil 1000 MG Oral Capsule; TAKE 1 CAPSULE DAILY; Therapy: 07Yym0427 to Recorded   6  Gabapentin 300 MG Oral Capsule; take 1 capsule three times a day; Therapy: 45RXE3241 to (Last Rx:17Aug2016)  Requested for: 32Vxx4111 Ordered   7  Invokana 300 MG Oral Tablet; 1 qd; Therapy: 33KZP5068 to (Last Rx:01Feb2017) Ordered   8  Janumet XR  MG Oral Tablet Extended Release 24 Hour; 2 qd; Therapy: 01FFC3051 to (Last Rx:01Feb2017)  Requested for: 19PXL7495 Ordered   9  Sertraline HCl - 100 MG Oral Tablet; Take 2 tablets daily; Therapy: 55AYE3279 to ([phone])  Requested for: 80HCR0052; Last   Rx:01Feb2017 Ordered   10  Simvastatin 40 MG Oral Tablet; TAKE 1 TABLET DAILY; Therapy: 07DLB9246 to ([phone])  Requested for: 88AYU5635; Last    Rx:77Xyt8328 Ordered   11  Tamsulosin HCl - 0 4 MG Oral Capsule; take 1 capsule daily; Therapy: 76IDZ9967 to (Evaluate:27Jan2018); Last Rx:54Vhq0268 Ordered   12  Valsartan-Hydrochlorothiazide 320-12 5 MG Oral Tablet; TAKE 1 TABLET ONCE DAILY; Therapy: 44AJC8869 to (LDARRTXI:30TNR9734)  Requested for: 10KJL4965; Last    Rx:01Feb2017 Ordered   13  Vitamin D3 2000 UNIT Oral Capsule; 1 qd; Therapy: 17TTQ5286 to Recorded  Medication List Reviewed: The medication list was reviewed and updated today  Allergies    1  No Known Drug Allergies    Vitals  Vital Signs    Recorded: 20Mar2017 01:50PM   Heart Rate 608   Systolic 808   Diastolic 90   Height 5 ft 10 5 in   Weight 244 lb    BMI Calculated 34 52   BSA Calculated 2 28     Physical Exam    Constitutional   General appearance: No acute distress, well appearing and well nourished  Pulmonary   Respiratory effort: No increased work of breathing or signs of respiratory distress  Auscultation of lungs: Clear to auscultation  Cardiovascular   Auscultation of heart: Normal rate and rhythm, normal S1 and S2, without murmurs  Examination of extremities for edema and/or varicosities: Normal     Abdomen   Abdomen: Non-tender, no masses  Liver and spleen: No hepatomegaly or splenomegaly      Musculoskeletal   Gait and station: Normal     Additional Exam:  Neuro exam nonfocal       Results/Data  Urine Dip Non-Automated- POC 34YHQ0885 01:49PM Susie Ritchie     Test Name Result Flag Reference   Color Yellow     Clarity Transparent     Leukocytes -     Nitrite -     Blood large     Protein +     Ph 6     Specific Gravity 1 030     Ketone -     Glucose 2 or more         Future Appointments    Date/Time Provider Specialty Site   04/12/2017 06:15 PM Kasandra Campbell DO [facility] [address]     Signatures   Electronically signed by : LORRIE Hickey ; Mar 20 2017  2:42PM EST                       (Author)

## 2018-01-11 NOTE — RESULT NOTES
Verified Results  (Q) HEMOGLOBIN A1c 92MEQ4767 07:15PM David Hamilton     Test Name Result Flag Reference   HEMOGLOBIN A1c 8 9 % of total Hgb H <5 7   According to ADA guidelines, hemoglobin A1c <7 0%  represents optimal control in non-pregnant diabetic  patients  Different metrics may apply to specific  patient populations  Standards of Medical Care in    Diabetes Care  2013;36:s11-s66     For the purpose of screening for the presence of  diabetes  <5 7%       Consistent with the absence of diabetes  5 7-6 4%    Consistent with increased risk for diabetes              (prediabetes)  >or=6 5%    Consistent with diabetes     This assay result is consistent with diabetes  mellitus  Currently, no consensus exists for use of hemoglobin  A1c for diagnosis of diabetes for children  Discussion/Summary   Overall blood sugar control, hemoglobin A1c, is improved although remains elevated at 8 9  Patient to continue present treatment and must follow a low sugar/carbohydrate diet more carefully  Will recheck in 3 months

## 2018-01-14 VITALS
RESPIRATION RATE: 16 BRPM | WEIGHT: 242 LBS | DIASTOLIC BLOOD PRESSURE: 104 MMHG | TEMPERATURE: 98.7 F | BODY MASS INDEX: 34.23 KG/M2 | HEART RATE: 88 BPM | SYSTOLIC BLOOD PRESSURE: 154 MMHG

## 2018-01-14 VITALS
WEIGHT: 244 LBS | SYSTOLIC BLOOD PRESSURE: 170 MMHG | HEART RATE: 100 BPM | DIASTOLIC BLOOD PRESSURE: 90 MMHG | HEIGHT: 71 IN | BODY MASS INDEX: 34.16 KG/M2

## 2018-01-14 NOTE — RESULT NOTES
Verified Results  (Q) CBC (INCLUDES DIFF/PLT) (REFL) [id number] 07:11AM Mal Oglethorpe     Test Name Result Flag Reference   WHITE BLOOD CELL COUNT 6 6 Thousand/uL  3 8-10 8   RED BLOOD CELL COUNT 4 60 Million/uL  4 20-5 80   HEMOGLOBIN 14 6 g/dL  13 2-17 1   HEMATOCRIT 41 6 %  38 5-50 0   MCV 90 4 fL  80 0-100 0   MCH 31 8 pg  27 0-33 0   MCHC 35 2 g/dL  32 0-36 0   RDW 13 3 %  11 0-15 0   PLATELET COUNT 463 Thousand/uL  140-400   MPV 9 6 fL  7 5-11 5   ABSOLUTE NEUTROPHILS 3590 cells/uL  9727-3246   ABSOLUTE LYMPHOCYTES 2455 cells/uL  850-3900   ABSOLUTE MONOCYTES 409 cells/uL  200-950   ABSOLUTE EOSINOPHILS 132 cells/uL     ABSOLUTE BASOPHILS 13 cells/uL  0-200   NEUTROPHILS 54 4 %     LYMPHOCYTES 37 2 %     MONOCYTES 6 2 %     EOSINOPHILS 2 0 %     BASOPHILS 0 2 %       (Q) COMPREHENSIVE METABOLIC PNL W/ADJUSTED CALCIUM [id number] 07:11AM Mal Oglethorpe     Test Name Result Flag Reference   GLUCOSE 213 mg/dL H 65-99   Fasting reference interval   UREA NITROGEN (BUN) 16 mg/dL  7-25   CREATININE 0 84 mg/dL  0 60-1 35   eGFR NON-AFR   AMERICAN 104 mL/min/1 73m2  > OR = 60   eGFR AFRICAN AMERICAN 120 mL/min/1 73m2  > OR = 60   BUN/CREATININE RATIO   3-68   NOT APPLICABLE (calc)   SODIUM 139 mmol/L  135-146   POTASSIUM 3 6 mmol/L  3 5-5 3   CHLORIDE 100 mmol/L     CARBON DIOXIDE 29 mmol/L  20-31   CALCIUM 9 0 mg/dL  8 6-10 3   CALCIUM (ADJUSTED FOR$ALBUMIN) 9 1 mg/dL (calc)  8 6-10 2   PROTEIN, TOTAL 7 1 g/dL  6 1-8 1   ALBUMIN 4 3 g/dL  3 6-5 1   GLOBULIN 2 8 g/dL (calc)  1 9-3 7   ALBUMIN/GLOBULIN RATIO 1 5 (calc)  1 0-2 5   BILIRUBIN, TOTAL 1 3 mg/dL H 0 2-1 2   ALKALINE PHOSPHATASE 74 U/L     AST 30 U/L  10-40   ALT 33 U/L  9-46     (Q) LIPID PANEL WITH REFLEX TO DIRECT LDL [id number] 07:11AM Mal Ogle[place]pe     Test Name Result Flag Reference   CHOLESTEROL, TOTAL 155 mg/dL  125-200   HDL CHOLESTEROL 29 mg/dL L > OR = 40   TRIGLICERIDES 223 mg/dL H <150   LDL-CHOLESTEROL 63 mg/dL (calc)  <130 Desirable range <100 mg/dL for patients with CHD or  diabetes and <70 mg/dL for diabetic patients with  known heart disease  CHOL/HDLC RATIO 5 3 (calc) H < OR = 5 0   NON HDL CHOLESTEROL 126 mg/dL (calc)     Target for non-HDL cholesterol is 30 mg/dL higher than   LDL cholesterol target  (Q) MICROALBUMIN, RANDOM URINE (W/CREATININE) [id number] 07:11AM Kory Lucas     Test Name Result Flag Reference   CREATININE, RANDOM URINE 153 mg/dL     MICROALBUMIN 7 2 mg/dL     Reference Range  Not established   MICROALBUMIN/CREATININE$RATIO, RANDOM URINE 47 mcg/mg creat H <30   The ADA defines abnormalities in albumin  excretion as follows:     Category         Result (mcg/mg creatinine)     Normal                    <30  Microalbuminuria            Clinical albuminuria   > OR = 300     The ADA recommends that at least two of three  specimens collected within a 3-6 month period be  abnormal before considering a patient to be  within a diagnostic category  (Q) TSH, 3RD GENERATION W/REFLEX TO FT4 [id number] 07:11AM Kory Lucas     Test Name Result Flag Reference   TSH W/REFLEX TO FT4 1 27 mIU/L  0 40-4 50     *(Q) VITAMIN D, 25-HYDROXY, LC/MS/MS [id number] 07:11AM Kory Lucas     Test Name Result Flag Reference   VITAMIN D, 25-OH, TOTAL 19 ng/mL L    Vitamin D Status         25-OH Vitamin D:     Deficiency:                    <20 ng/mL  Insufficiency:             20 - 29 ng/mL  Optimal:                 > or = 30 ng/mL     For 25-OH Vitamin D testing on patients on   D2-supplementation and patients for whom quantitation   of D2 and D3 fractions is required, the QuestAssureD()  25-OH VIT D, (D2,D3), LC/MS/MS is recommended: order   code 81889 (patients >2yrs)  For more information on this test, go to:  http://education  [employer]/faq/HAU999  (This link is being provided for   informational/educational purposes only )     (1) PSA (SCREEN) (Dx V76 44 Screen for Prostate Cancer) 02Sep2016 07:11AM Casandra Boyd     Test Name Result Flag Reference   PSA, TOTAL 0 6 ng/mL  < OR = 4 0   This test was performed using the Siemens  chemiluminescent method  Values obtained from  different assay methods cannot be used  interchangeably  PSA levels, regardless of  value, should not be interpreted as absolute  evidence of the presence or absence of disease  (Q) HEMOGLOBIN A1c 02Sep2016 07:11AM Casandra Boyd   REPORT COMMENT:  FASTING:YES     Test Name Result Flag Reference   HEMOGLOBIN A1c 10 1 % of total Hgb H <5 7   According to ADA guidelines, hemoglobin A1c <7 0%  represents optimal control in non-pregnant diabetic  patients  Different metrics may apply to specific  patient populations  Standards of Medical Care in    Diabetes Care  2013;36:s11-s66     For the purpose of screening for the presence of  diabetes  <5 7%       Consistent with the absence of diabetes  5 7-6 4%    Consistent with increased risk for diabetes              (prediabetes)  >or=6 5%    Consistent with diabetes     This assay result is consistent with diabetes  mellitus  Currently, no consensus exists for use of hemoglobin  A1c for diagnosis of diabetes for children  Plan  Diabetes mellitus    · Invokana 300 MG Oral Tablet; 1 qd    Discussion/Summary   Phone call to patient discussed lab results  Fasting blood sugar remains elevated over 200 and overall blood sugar control, hemoglobin A1c, remains poor at 10 1  Triglycerides remain elevated although are improved and vitamin D remains decreased  Discussed treatment options  Patient will add Invokana 300 mg daily and increase vitamin D to 2000 international units daily

## 2018-01-15 NOTE — PROGRESS NOTES
History of Present Illness  Care Coordination Encounter Information:   Type of Encounter: Telephonic   Contact: Initial Contact    Spoke to Patient  Care Coordination SL Nurse Mark Simmons:   The reason for call is to discuss outreach for follow up/needed services  Graham Olivas had lithotripsy done on the  and went back to the ED the following day and passed 8 stones  He had a stent placed and is scheduled today to have it removed  He states his sugar was 228 when he was in the hospital and he has been checking it periodically and it has been averaging 160  He is following his diabetic diet and is taking his medication  He plans to schedule a F/U appointment with Dr Mylene Green when things settle down with his urologic issues  He offers no questions or concerns  Active Problems    1  Acute sinusitis (461 9) (J01 90)   2  Anxiety (300 00) (F41 9)   3  Diabetes mellitus (250 00) (E11 9)   4  Diabetic peripheral neuropathy (250 60,357 2) (E11 42)   5  Encounter for prostate cancer screening (V76 44) (Z12 5)   6  Flank pain (789 09) (R10 9)   7  Hyperlipidemia (272 4) (E78 5)   8  Hypertension (401 9) (I10)   9  Kidney stone on left side (592 0) (N20 0)   10  Obesity (278 00) (E66 9)    Past Medical History    1  History of hypertension (V12 59) (Z86 79)   2  History of Kidney stones (592 0) (N20 0)    Family History  Mother    1  Family history of diabetes mellitus (V18 0) (Z83 3)  Father    2  Family history of    3  Family history of diabetes mellitus (V18 0) (Z83 3)   4  Family history of hypertension (V17 49) (Z82 49)  Maternal Grandmother    5  Family history of malignant neoplasm of breast (V16 3) (Z80 3)  Family History    6  Denied: Family history of colon cancer   7  Denied: Family history of Prostate cancer    Social History    · Never smoked   · Never smoker    Current Meds    1  ALPRAZolam 0 25 MG Oral Tablet; TAKE 1 TABLET 3 TIMES DAILY AS NEEDED;    Therapy: 60SFT1251 to (Evaluate:2017); Last Rx:01Feb2017 Ordered   2  Sertraline HCl - 100 MG Oral Tablet; Take 2 tablets daily; Therapy: 36DFB1346 to ([phone])  Requested for: 97KAJ1890; Last   Rx:01Feb2017 Ordered    3  Accu-Chek Abigail Device; USE AS DIRECTED; Therapy: 91WVL0497 to (Last Rx:04Mar2015)  Requested for: 91TXQ8005 Ordered   4  Invokana 300 MG Oral Tablet; 1 qd; Therapy: 76HAD9309 to (Last Rx:01Feb2017) Ordered   5  Janumet XR  MG Oral Tablet Extended Release 24 Hour; 2 qd; Therapy: 81OSS5172 to (Last Rx:01Feb2017)  Requested for: 79GMG8341 Ordered    6  Gabapentin 300 MG Oral Capsule; take 1 capsule three times a day; Therapy: 14DCW4505 to (Last Rx:17Aug2016)  Requested for: 17Aug2016 Ordered    7  Aspirin 81 MG TABS; TAKE 1 TABLET DAILY; Therapy: (Recorded:73Qbm8656) to Recorded    8  Fish Oil 1000 MG Oral Capsule; TAKE 1 CAPSULE DAILY; Therapy: 38Tex0478 to Recorded   9  Simvastatin 40 MG Oral Tablet; TAKE 1 TABLET DAILY; Therapy: 05EXV3098 to ([phone])  Requested for: 58NZW4218; Last   Rx:01Feb2017 Ordered    10  AmLODIPine Besylate 10 MG Oral Tablet; TAKE 1 TABLET DAILY; Therapy: 10NNL4740 to (Evaluate:27Jan2018)  Requested for: 12ARS1995; Last    Rx:01Feb2017 Ordered   11  Valsartan-Hydrochlorothiazide 320-12 5 MG Oral Tablet; TAKE 1 TABLET ONCE DAILY; Therapy: 36DCU3556 to (OKJGTFIN:95LHG7884)  Requested for: 65VGX6060; Last    Rx:01Feb2017 Ordered    12  Oxycodone-Acetaminophen 5-325 MG Oral Tablet; TAKE 1 TO 2 TABLETS EVERY 4    HOURS AS NEEDED FOR PAIN;    Therapy: 95UXA6025 to (Evaluate:09Apr2017); Last Rx:06Apr2017 Ordered   13  Tamsulosin HCl - 0 4 MG Oral Capsule; take 1 capsule daily; Therapy: 87XIZ3699 to (Evaluate:27Jan2018); Last Rx:68Jjx5199 Ordered    14  Vitamin D3 2000 UNIT Oral Capsule; 1 qd; Therapy: 08Sep2014 to Recorded    Allergies    1  No Known Drug Allergies    Health Management   Hemoglobin A1c- POC; every 6 months;  Last 21YUC4393; Next Due: 52UCR1517; Overdue  Urine Microalbumin/Creatinine - POC; every 1 year; Next Due: 93HNU6824; Overdue    End of Encounter Meds    1  ALPRAZolam 0 25 MG Oral Tablet; TAKE 1 TABLET 3 TIMES DAILY AS NEEDED; Therapy: 72QOT5585 to (Evaluate:03Mar2017); Last Rx:01Feb2017 Ordered   2  Sertraline HCl - 100 MG Oral Tablet; Take 2 tablets daily; Therapy: 12PUS2328 to ()  Requested for: 36BHK2212; Last   Rx:01Feb2017 Ordered    3  Accu-Chek Abigail Device; USE AS DIRECTED; Therapy: 96QMH3930 to (Last Rx:04Mar2015)  Requested for: 33JVQ8057 Ordered   4  Invokana 300 MG Oral Tablet; 1 qd; Therapy: 91WIO6777 to (Last Rx:01Feb2017) Ordered   5  Janumet XR  MG Oral Tablet Extended Release 24 Hour; 2 qd; Therapy: 54RBE9080 to (Last Rx:01Feb2017)  Requested for: 49XZP6683 Ordered    6  Gabapentin 300 MG Oral Capsule; take 1 capsule three times a day; Therapy: 21SZG3211 to (Last Rx:17Aug2016)  Requested for: 13Las9979 Ordered    7  Aspirin 81 MG TABS; TAKE 1 TABLET DAILY; Therapy: (Recorded:27Bkd4207) to Recorded    8  Fish Oil 1000 MG Oral Capsule; TAKE 1 CAPSULE DAILY; Therapy: 08Hgr0731 to Recorded   9  Simvastatin 40 MG Oral Tablet; TAKE 1 TABLET DAILY; Therapy: 38ZNC7007 to ()  Requested for: 68NFI9487; Last   Rx:01Feb2017 Ordered    10  AmLODIPine Besylate 10 MG Oral Tablet; TAKE 1 TABLET DAILY; Therapy: 18LFU3454 to (Evaluate:27Jan2018)  Requested for: 48CKX8662; Last    Rx:01Feb2017 Ordered   11  Valsartan-Hydrochlorothiazide 320-12 5 MG Oral Tablet; TAKE 1 TABLET ONCE DAILY; Therapy: 22OMH4753 to (UCMGVBIM:12UTH5585)  Requested for: 06UNY7005; Last    Rx:01Feb2017 Ordered    12  Oxycodone-Acetaminophen 5-325 MG Oral Tablet; TAKE 1 TO 2 TABLETS EVERY 4    HOURS AS NEEDED FOR PAIN;    Therapy: 32IDT4315 to (Evaluate:09Apr2017); Last Rx:06Apr2017 Ordered   13  Tamsulosin HCl - 0 4 MG Oral Capsule; take 1 capsule daily; Therapy: 31JNS6980 to (Evaluate:27Jan2018);  Last Rx:33Rvg3645 Ordered    14  Vitamin D3 2000 UNIT Oral Capsule; 1 qd; Therapy: 51UUR2456 to Recorded    Future Appointments    Date/Time Provider Specialty Site   07/18/2017 09:45 AM Venecia Pierce, [facility] Urology [address] [facility]     Patient Care Team    Care Team Member Role Specialty Office Number   Latosha NUNEZ    Urology [phone]   [place] [facility]  Family Medicine [phone]     Signatures   Electronically signed by : Jerry Shelton, ; May 30 2017  2:12PM EST                       (Author)

## 2018-04-26 ENCOUNTER — OFFICE VISIT (OUTPATIENT)
Dept: URGENT CARE | Facility: CLINIC | Age: 48
End: 2018-04-26
Payer: COMMERCIAL

## 2018-04-26 VITALS
SYSTOLIC BLOOD PRESSURE: 168 MMHG | WEIGHT: 238 LBS | BODY MASS INDEX: 33.32 KG/M2 | RESPIRATION RATE: 16 BRPM | HEIGHT: 71 IN | OXYGEN SATURATION: 98 % | DIASTOLIC BLOOD PRESSURE: 114 MMHG | TEMPERATURE: 97.6 F | HEART RATE: 94 BPM
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
## 2018-04-26 PROBLEM — N20.0 KIDNEY STONE ON LEFT SIDE: Status: ACTIVE | Noted: 2017-02-01

## 2018-04-26 PROCEDURE — 99213 OFFICE O/P EST LOW 20 MIN: CPT | Performed by: NURSE PRACTITIONER

## 2018-04-26 RX ORDER — SERTRALINE HYDROCHLORIDE 100 MG/1
2 TABLET, FILM COATED ORAL DAILY
COMMUNITY
Start: 2014-07-31 | End: 2018-10-05 | Stop reason: SDUPTHER

## 2018-04-26 RX ORDER — CHLORAL HYDRATE 500 MG
1 CAPSULE ORAL DAILY
COMMUNITY
Start: 2014-09-08

## 2018-04-26 RX ORDER — HYDROXYZINE HYDROCHLORIDE 25 MG/1
25 TABLET, FILM COATED ORAL EVERY 6 HOURS PRN
Qty: 30 TABLET | Refills: 0 | Status: SHIPPED | OUTPATIENT
Start: 2018-04-26 | End: 2018-10-05

## 2018-04-26 RX ORDER — ALPRAZOLAM 0.25 MG/1
1 TABLET ORAL 3 TIMES DAILY PRN
COMMUNITY
Start: 2014-07-31 | End: 2018-10-05 | Stop reason: SDUPTHER

## 2018-04-26 RX ORDER — TAMSULOSIN HYDROCHLORIDE 0.4 MG/1
1 CAPSULE ORAL DAILY
COMMUNITY
Start: 2017-02-01 | End: 2018-11-15

## 2018-04-26 NOTE — PATIENT INSTRUCTIONS
Use zyrtec or allegra daily   Use calamine lotion to the lower legs   Use atarax for itching  If s/s of infection occur follow up with pcp

## 2018-04-26 NOTE — PROGRESS NOTES
NAME: Jen Lizarraga is a 46 y o  male  : 1970    MRN: [id number]      Assessment and Plan   Bug bite, initial encounter [W57  XXXA]  1  Bug bite, initial encounter  hydrOXYzine HCL (ATARAX) 25 mg tablet       Butch Brown was seen today for insect bite  Diagnoses and all orders for this visit:    Bug bite, initial encounter  -     hydrOXYzine HCL (ATARAX) 25 mg tablet; Take 1 tablet (25 mg total) by mouth every 6 (six) hours as needed for itching        Patient Instructions   Patient Instructions   Use zyrtec or allegra daily   Use calamine lotion to the lower legs   Use atarax for itching  If s/s of infection occur follow up with pcp       Proceed to ER if symptoms worsen  Chief Complaint     Chief Complaint   Patient presents with   Calvin Manzano 81     recently in [place] [address] from -, noticed bites on LEs on the ; c/o itchy and pain when touched; bites noted on UEs as well         History of Present Illness     Patient has multiple bites to the lower legs and has had them for the past 2days  He was in [place] the beach and the bites are itchy and burns  He has used over the counter products for the past few days and no relief  He took benadryl as well with no relief and keeping him up at night  He was at [facility] as well and the bites are only on his lower legs, mainly around his feet and ankles  Review of Systems   Review of Systems   Skin: Positive for rash           Current Medications       Current Outpatient Prescriptions:     ALPRAZolam (XANAX) 0 25 mg tablet, Take 1 tablet by mouth 3 (three) times a day as needed, Disp: , Rfl:     amLODIPine (NORVASC) 5 mg tablet, Take 5 mg by mouth daily, Disp: , Rfl:     aspirin 81 MG tablet, Take 81 mg by mouth daily, Disp: , Rfl:     Blood Glucose Monitoring Suppl (ACCU-CHEK JESSY CONNECT) w/Device KIT, by Does not apply route, Disp: , Rfl:     Canagliflozin (INVOKANA) 300 MG TABS, Take 1 tablet by mouth daily, Disp: , Rfl:     Cholecalciferol (VITAMIN D3) 2000 UNITS TABS, Take 1 tablet by mouth daily, Disp: , Rfl:     GABAPENTIN PO, Take 3 tablets by mouth 3 (three) times a day, Disp: , Rfl:     Omega-3 Fatty Acids (FISH OIL) 1,000 mg, Take 1 capsule by mouth daily, Disp: , Rfl:     sertraline (ZOLOFT) 100 mg tablet, Take 2 tablets by mouth daily, Disp: , Rfl:     simvastatin (ZOCOR) 40 mg tablet, Take 40 mg by mouth daily at bedtime, Disp: , Rfl:     sitaGLIPtin-metFORMIN (JANUMET)  MG per tablet, Take 2 tablets by mouth daily, Disp: , Rfl:     tamsulosin (FLOMAX) 0 4 mg, Take 1 capsule by mouth daily, Disp: , Rfl:     valsartan-hydrochlorothiazide (DIOVAN-HCT) 320-12 5 MG per tablet, Take 1 tablet by mouth daily, Disp: , Rfl:     hydrOXYzine HCL (ATARAX) 25 mg tablet, Take 1 tablet (25 mg total) by mouth every 6 (six) hours as needed for itching, Disp: 30 tablet, Rfl: 0    ibuprofen (MOTRIN) 600 mg tablet, Take 1 tablet by mouth every 6 (six) hours as needed for mild pain for up to 10 days, Disp: 40 tablet, Rfl: 0    oxyCODONE-acetaminophen (PERCOCET) 5-325 mg per tablet, Take 1 tablet by mouth every 4 (four) hours as needed for severe pain for up to 12 doses Max Daily Amount: 6 tablets, Disp: 12 tablet, Rfl: 0    tamsulosin (FLOMAX) 0 4 mg, Take 1 capsule by mouth daily with dinner for 14 days, Disp: 14 capsule, Rfl: 0    Current Allergies     Allergies as of 04/26/2018    (No Known Allergies)              Past Medical History:   Diagnosis Date    Anxiety     Diabetes mellitus ([address] )     Hyperlipidemia     Hypertension     Kidney stone     Psychiatric disorder     Anxiety       Past Surgical History:   Procedure Laterality Date    EXTRACORPOREAL SHOCK WAVE LITHOTRIPSY      KNEE ARTHROSCOPY Left     torn meniscus    WY CYSTO/URETERO W/LITHOTRIPSY &INDWELL STENT INSRT Left 5/23/2017    Procedure: CYSTOSCOPY; URETEROSCOPY; HOLMIUM LASER; RETROGRADE PYELOGRAM; STENT PLACEMENT ;  Surgeon: Rose Lindsay MD;  [facility];  Service: Urology    UT FRAGMENT KIDNEY STONE/ ESWL Left 5/18/2017    Procedure: ESWL ;  Surgeon: Arthur Recinos MD;  [facility];  Service: Urology       Family History   Problem Relation Age of Onset    Cancer Maternal Grandmother          Medications have been verified  The following portions of the patient's history were reviewed and updated as appropriate: allergies, current medications, past family history, past medical history, past social history, past surgical history and problem list     Objective   BP (!) 168/114   Pulse 94   Temp 97 6 °F (36 4 °C) (Tympanic)   Resp 16   Ht 5' 11" (1 803 m)   Wt 108 kg (238 lb)   SpO2 98%   BMI 33 19 kg/m²      Physical Exam     Physical Exam   Constitutional: He appears well-developed and well-nourished     Skin:            DUSTIN Barnett

## 2018-10-05 ENCOUNTER — OFFICE VISIT (OUTPATIENT)
Dept: FAMILY MEDICINE CLINIC | Facility: CLINIC | Age: 48
End: 2018-10-05
Payer: COMMERCIAL

## 2018-10-05 VITALS
WEIGHT: 242 LBS | BODY MASS INDEX: 33.88 KG/M2 | HEIGHT: 71 IN | RESPIRATION RATE: 16 BRPM | TEMPERATURE: 98.2 F | DIASTOLIC BLOOD PRESSURE: 90 MMHG | SYSTOLIC BLOOD PRESSURE: 148 MMHG | OXYGEN SATURATION: 98 % | HEART RATE: 96 BPM

## 2018-10-05 DIAGNOSIS — F32.9 REACTIVE DEPRESSION: ICD-10-CM

## 2018-10-05 DIAGNOSIS — F41.9 ANXIETY: Primary | ICD-10-CM

## 2018-10-05 PROCEDURE — 99213 OFFICE O/P EST LOW 20 MIN: CPT | Performed by: FAMILY MEDICINE

## 2018-10-05 RX ORDER — ALPRAZOLAM 0.25 MG/1
0.25 TABLET ORAL 3 TIMES DAILY PRN
Qty: 30 TABLET | Refills: 0 | Status: SHIPPED | OUTPATIENT
Start: 2018-10-05 | End: 2019-05-20 | Stop reason: SDUPTHER

## 2018-10-05 RX ORDER — SERTRALINE HYDROCHLORIDE 100 MG/1
200 TABLET, FILM COATED ORAL DAILY
Qty: 180 TABLET | Refills: 1 | Status: SHIPPED | OUTPATIENT
Start: 2018-10-05 | End: 2019-11-04 | Stop reason: SDUPTHER

## 2018-10-05 NOTE — PROGRESS NOTES
Assessment/Plan:    Discussed treatment options with patient and wife  Patient instructed to increase sertraline back to 200 mg daily and to take alprazolam p r n  Recommend referral to psychologist for counseling  Patient to return to the office in 1 month for routine appointment for chronic conditions and fasting labs  Diagnoses and all orders for this visit:    Anxiety  Comments:  Alprazolam p r n  Tom Jim Referral to psychologist for counseling  Orders:  -     ALPRAZolam (XANAX) 0 25 mg tablet; Take 1 tablet (0 25 mg total) by mouth 3 (three) times a day as needed for anxiety    Reactive depression  Comments:  Increase sertraline to 200 mg daily  Orders:  -     sertraline (ZOLOFT) 100 mg tablet; Take 2 tablets (200 mg total) by mouth daily          Subjective:      Patient ID: Tima Velasquez is a 46 y o  male  Patient complains of increased stress and anxiety problems  He admits to feeling somewhat depressed  Patient has been noncompliant with keeping follow-up appointments and has not been taking his medications regularly  Patient recently exercising and physically feeling fairly well overall  Home glucose monitoring reveals fasting blood sugars greater than 200  Patient is being seen with his wife in attendance  Medication Refill   Pertinent negatives include no chest pain  Anxiety   Presents for follow-up visit  Symptoms include depressed mood, excessive worry, irritability, nervous/anxious behavior, palpitations, panic and restlessness  Patient reports no chest pain, confusion, decreased concentration, dizziness, hyperventilation, insomnia or suicidal ideas  Symptoms occur most days  The quality of sleep is fair   Nighttime awakenings: occasional            The following portions of the patient's history were reviewed and updated as appropriate: allergies, current medications, past family history, past medical history, past social history, past surgical history and problem list     Review of Systems   Constitutional: Positive for irritability  Cardiovascular: Positive for palpitations  Negative for chest pain  Neurological: Negative for dizziness  Psychiatric/Behavioral: Negative for confusion, decreased concentration and suicidal ideas  The patient is nervous/anxious  The patient does not have insomnia  Objective:      /90   Pulse 96   Temp 98 2 °F (36 8 °C)   Resp 16   Ht 5' 10 87" (1 8 m)   Wt 110 kg (242 lb)   SpO2 98%   BMI 33 88 kg/m²          Physical Exam   Constitutional: He is oriented to person, place, and time  He appears well-developed and well-nourished  No distress  HENT:   Head: Normocephalic  Right Ear: External ear normal    Left Ear: External ear normal    Nose: Nose normal    Mouth/Throat: Oropharynx is clear and moist    Eyes: Conjunctivae are normal  No scleral icterus  Neck: Neck supple  Cardiovascular: Normal rate and regular rhythm  Pulmonary/Chest: Effort normal and breath sounds normal    Abdominal: Soft  There is no tenderness  Musculoskeletal: He exhibits no edema  Lymphadenopathy:     He has no cervical adenopathy  Neurological: He is alert and oriented to person, place, and time  Skin: Skin is warm and dry  Psychiatric: He has a normal mood and affect   His behavior is normal  Judgment and thought content normal

## 2018-11-15 ENCOUNTER — OFFICE VISIT (OUTPATIENT)
Dept: FAMILY MEDICINE CLINIC | Facility: CLINIC | Age: 48
End: 2018-11-15
Payer: COMMERCIAL

## 2018-11-15 VITALS
BODY MASS INDEX: 32.76 KG/M2 | DIASTOLIC BLOOD PRESSURE: 92 MMHG | WEIGHT: 234 LBS | TEMPERATURE: 97.5 F | RESPIRATION RATE: 16 BRPM | SYSTOLIC BLOOD PRESSURE: 142 MMHG | HEART RATE: 96 BPM

## 2018-11-15 DIAGNOSIS — I10 ESSENTIAL HYPERTENSION: ICD-10-CM

## 2018-11-15 DIAGNOSIS — E55.9 VITAMIN D DEFICIENCY: ICD-10-CM

## 2018-11-15 DIAGNOSIS — E13.8 DIABETES MELLITUS OF OTHER TYPE WITH COMPLICATION, UNSPECIFIED WHETHER LONG TERM INSULIN USE: Primary | ICD-10-CM

## 2018-11-15 DIAGNOSIS — F41.9 ANXIETY: ICD-10-CM

## 2018-11-15 DIAGNOSIS — E66.9 CLASS 1 OBESITY WITH SERIOUS COMORBIDITY AND BODY MASS INDEX (BMI) OF 32.0 TO 32.9 IN ADULT, UNSPECIFIED OBESITY TYPE: ICD-10-CM

## 2018-11-15 DIAGNOSIS — F32.9 REACTIVE DEPRESSION: ICD-10-CM

## 2018-11-15 DIAGNOSIS — Z12.11 ENCOUNTER FOR SCREENING COLONOSCOPY: ICD-10-CM

## 2018-11-15 DIAGNOSIS — E78.5 HYPERLIPIDEMIA, UNSPECIFIED HYPERLIPIDEMIA TYPE: ICD-10-CM

## 2018-11-15 DIAGNOSIS — Z12.5 SCREENING PSA (PROSTATE SPECIFIC ANTIGEN): ICD-10-CM

## 2018-11-15 LAB — SL AMB POCT HEMOGLOBIN AIC: 8.8

## 2018-11-15 PROCEDURE — 99214 OFFICE O/P EST MOD 30 MIN: CPT | Performed by: FAMILY MEDICINE

## 2018-11-15 PROCEDURE — 3045F PR MOST RECENT HEMOGLOBIN A1C LEVEL 7.0-9.0%: CPT | Performed by: FAMILY MEDICINE

## 2018-11-15 PROCEDURE — 90682 RIV4 VACC RECOMBINANT DNA IM: CPT

## 2018-11-15 PROCEDURE — 83036 HEMOGLOBIN GLYCOSYLATED A1C: CPT | Performed by: FAMILY MEDICINE

## 2018-11-15 PROCEDURE — 90471 IMMUNIZATION ADMIN: CPT

## 2018-11-15 PROCEDURE — 1036F TOBACCO NON-USER: CPT | Performed by: FAMILY MEDICINE

## 2018-11-15 RX ORDER — GLIMEPIRIDE 2 MG/1
2 TABLET ORAL
Qty: 30 TABLET | Refills: 5 | Status: SHIPPED | OUTPATIENT
Start: 2018-11-15 | End: 2019-05-20 | Stop reason: SDUPTHER

## 2018-11-15 NOTE — ASSESSMENT & PLAN NOTE
BP fairly well controlled  Continue present treatment follow a low-salt diet and get regular aerobic exercise 150 min per week

## 2018-11-15 NOTE — PROGRESS NOTES
Assessment/Plan:  Patient received flu blok vaccine today  Fasting labs drawn as below  Patient to continue present treatment and add glimepiride 2 mg daily  Patient is being referred to Podiatry for diabetic foot care and Gastroenterology for screening colonoscopy  Patient to schedule yearly diabetic eye exam   Return to the office in 3 months  Diabetes mellitus ([facility] [address] )  Lab Results   Component Value Date    HGBA1C 8 8 11/15/2018    Poorly controlled with fingerstick hemoglobin A1c at 8 8  Patient will continue Janumet and add glimepiride 2 mg daily  Patient instructed to follow a low sugar and low-carbohydrate diet more carefully  Recommend regular home glucose monitoring  Patient to schedule yearly diabetic eye exam and referred to Podiatry  Discussed goal of hemoglobin A1c less than 7  No results for input(s): POCGLU in the last 72 hours  Blood Sugar Average: Last 72 hrs:      Hypertension  BP fairly well controlled  Continue present treatment follow a low-salt diet and get regular aerobic exercise 150 min per week  Hyperlipidemia  Lipids controlled on simvastatin 40 mg daily  Fasting lipid profile drawn today  Obesity  Continued weight loss strongly encouraged  Anxiety  Continue sertraline and alprazolam p r n  only  Reactive depression  Clinically improved on sertraline  Vitamin D deficiency  Continue vitamin-D supplement  Diagnoses and all orders for this visit:    Diabetes mellitus of other type with complication, unspecified whether long term insulin use (HCC)  -     POCT hemoglobin A1c  -     glimepiride (AMARYL) 2 mg tablet; Take 1 tablet (2 mg total) by mouth daily with breakfast  -     influenza vaccine, 4592-7545, quadrivalent, recombinant, PF, 0 5 mL, for patients 18 yr+ (FLUBLOK)  -     CBC and Platelet  -     Comprehensive metabolic panel  -     Microalbumin, Random Urine (W/Creatinine)  -     Ambulatory referral to Podiatry;  Future    Hyperlipidemia, unspecified hyperlipidemia type  -     Lipid panel    Essential hypertension  -     TSH, 3rd generation with Free T4 reflex    Class 1 obesity with serious comorbidity and body mass index (BMI) of 32 0 to 32 9 in adult, unspecified obesity type    Anxiety    Reactive depression    Vitamin D deficiency  -     Vitamin D 25 hydroxy    Screening PSA (prostate specific antigen)  -     PSA, Total Screen    Encounter for screening colonoscopy  -     Ambulatory referral to Gastroenterology; Future          Subjective:      Patient ID: Moira Sarah is a 46 y o  male  Patient is here for routine appointment for chronic conditions and fasting labs  Patient requests flu vaccine  Patient has been feeling somewhat better overall since increasing sertraline and has not taken alprazolam recently  Patient has been exercising at the gym 4 times a week for 45 min on the treadmill  He admits to 8 lb weight loss  Home glucose monitoring done occasionally reveals fasting blood sugars around 250 and 5:00 p m  blood sugars around 200  Patient is due for yearly diabetic eye exam and plans to schedule  Patient requests referral to podiatrist   Patient has never had colonoscopy and recommended to schedule  Diabetes   He presents for his follow-up diabetic visit  He has type 2 diabetes mellitus  His disease course has been stable  Pertinent negatives for hypoglycemia include no headaches  Associated symptoms include foot paresthesias and weight loss  Pertinent negatives for diabetes include no blurred vision, no chest pain, no fatigue, no foot ulcerations, no polydipsia, no polyuria, no visual change and no weakness  There are no hypoglycemic complications  Symptoms are stable  Diabetic complications include peripheral neuropathy  Pertinent negatives for diabetic complications include no CVA, heart disease or retinopathy   Risk factors for coronary artery disease include family history, dyslipidemia, diabetes mellitus, male sex, hypertension and obesity  Current diabetic treatment includes oral agent (dual therapy)  He is compliant with treatment all of the time  His weight is decreasing steadily  He is following a generally healthy diet  He participates in exercise three times a week  There is no change in his home blood glucose trend  His breakfast blood glucose is taken between 6-7 am  His breakfast blood glucose range is generally >200 mg/dl  His dinner blood glucose is taken between 5-6 pm  His dinner blood glucose range is generally >200 mg/dl  An ACE inhibitor/angiotensin II receptor blocker is being taken  He does not see a podiatrist Eye exam is current  Hypertension   This is a chronic problem  The problem is controlled  Associated symptoms include anxiety  Pertinent negatives include no blurred vision, chest pain, headaches, malaise/fatigue, orthopnea, palpitations, peripheral edema, PND or shortness of breath  Past treatments include angiotensin blockers and diuretics  The current treatment provides moderate improvement  There are no compliance problems  There is no history of CAD/MI, CVA or retinopathy  The following portions of the patient's history were reviewed and updated as appropriate: allergies, current medications, past family history, past medical history, past social history, past surgical history and problem list     Review of Systems   Constitutional: Positive for weight loss  Negative for fatigue and malaise/fatigue  Eyes: Negative for blurred vision  Respiratory: Negative for shortness of breath  Cardiovascular: Negative for chest pain, palpitations, orthopnea and PND  Endocrine: Negative for polydipsia and polyuria  Neurological: Negative for weakness and headaches  Objective:  Patient's shoes and socks removed  Right Foot/Ankle   Right Foot Inspection  Skin Exam: skin normal and skin intact no dry skin, no warmth, no callus, no erythema, no maceration, no abnormal color, no pre-ulcer, no ulcer and no callus                            Sensory       Monofilament testing: intact      Left Foot/Ankle  Left Foot Inspection  Skin Exam: skin normal and skin intactno dry skin, no warmth, no erythema, no maceration, normal color, no pre-ulcer, no ulcer and no callus                                         Sensory       Monofilament: intact    Assign Risk Category:  No deformity present; Loss of protective sensation; No weak pulses       Risk: 1      /92   Pulse 96   Temp 97 5 °F (36 4 °C)   Resp 16   Wt 106 kg (234 lb)   BMI 32 76 kg/m²          Physical Exam   Constitutional: He is oriented to person, place, and time  He appears well-developed and well-nourished  HENT:   Head: Normocephalic  Right Ear: External ear normal    Left Ear: External ear normal    Nose: Nose normal    Mouth/Throat: Oropharynx is clear and moist    Eyes: Conjunctivae are normal  No scleral icterus  Neck: Neck supple  No thyromegaly present  Cardiovascular: Normal rate and regular rhythm  Pulses are no weak pulses  Pulmonary/Chest: Effort normal and breath sounds normal    Abdominal: Soft  There is no tenderness  Musculoskeletal: He exhibits no edema  Feet:   Right Foot:   Skin Integrity: Negative for ulcer, skin breakdown, erythema, warmth, callus or dry skin  Left Foot:   Skin Integrity: Negative for ulcer, skin breakdown, erythema, warmth, callus or dry skin  Lymphadenopathy:     He has no cervical adenopathy  Neurological: He is alert and oriented to person, place, and time  Skin: Skin is warm and dry  Psychiatric: He has a normal mood and affect

## 2018-11-15 NOTE — ASSESSMENT & PLAN NOTE
Lab Results   Component Value Date    HGBA1C 8 8 11/15/2018    Poorly controlled with fingerstick hemoglobin A1c at 8 8  Patient will continue Janumet and add glimepiride 2 mg daily  Patient instructed to follow a low sugar and low-carbohydrate diet more carefully  Recommend regular home glucose monitoring  Patient to schedule yearly diabetic eye exam and referred to Podiatry  Discussed goal of hemoglobin A1c less than 7  No results for input(s): POCGLU in the last 72 hours      Blood Sugar Average: Last 72 hrs:

## 2018-11-16 DIAGNOSIS — R97.20 ELEVATED PSA: Primary | ICD-10-CM

## 2018-11-16 LAB
25(OH)D3 SERPL-MCNC: 21 NG/ML (ref 30–100)
ALBUMIN SERPL-MCNC: 4.4 G/DL (ref 3.6–5.1)
ALBUMIN/CREAT UR: 36 MCG/MG CREAT
ALBUMIN/GLOB SERPL: 1.4 (CALC) (ref 1–2.5)
ALP SERPL-CCNC: 83 U/L (ref 40–115)
ALT SERPL-CCNC: 25 U/L (ref 9–46)
AST SERPL-CCNC: 21 U/L (ref 10–35)
BILIRUB SERPL-MCNC: 1.1 MG/DL (ref 0.2–1.2)
BUN SERPL-MCNC: 20 MG/DL (ref 7–25)
BUN/CREAT SERPL: ABNORMAL (CALC) (ref 6–22)
CALCIUM SERPL-MCNC: 9.8 MG/DL (ref 8.6–10.3)
CHLORIDE SERPL-SCNC: 99 MMOL/L (ref 98–110)
CHOLEST SERPL-MCNC: 196 MG/DL
CHOLEST/HDLC SERPL: 7.3 (CALC)
CO2 SERPL-SCNC: 28 MMOL/L (ref 20–32)
CREAT SERPL-MCNC: 1.11 MG/DL (ref 0.7–1.33)
CREAT UR-MCNC: 221 MG/DL (ref 20–320)
ERYTHROCYTE [DISTWIDTH] IN BLOOD BY AUTOMATED COUNT: 12.8 % (ref 11–15)
GLOBULIN SER CALC-MCNC: 3.2 G/DL (CALC) (ref 1.9–3.7)
GLUCOSE SERPL-MCNC: 196 MG/DL (ref 65–99)
HCT VFR BLD AUTO: 38 % (ref 38.5–50)
HDLC SERPL-MCNC: 27 MG/DL
HGB BLD-MCNC: 13.6 G/DL (ref 13.2–17.1)
LDLC SERPL CALC-MCNC: 125 MG/DL (CALC)
MCH RBC QN AUTO: 31.3 PG (ref 27–33)
MCHC RBC AUTO-ENTMCNC: 35.8 G/DL (ref 32–36)
MCV RBC AUTO: 87.6 FL (ref 80–100)
MICROALBUMIN UR-MCNC: 7.9 MG/DL
NONHDLC SERPL-MCNC: 169 MG/DL (CALC)
PLATELET # BLD AUTO: 240 THOUSAND/UL (ref 140–400)
PMV BLD REES-ECKER: 11.6 FL (ref 7.5–12.5)
POTASSIUM SERPL-SCNC: 4.1 MMOL/L (ref 3.5–5.3)
PROT SERPL-MCNC: 7.6 G/DL (ref 6.1–8.1)
PSA SERPL-MCNC: 7.4 NG/ML
RBC # BLD AUTO: 4.34 MILLION/UL (ref 4.2–5.8)
SL AMB EGFR AFRICAN AMERICAN: 89 ML/MIN/1.73M2
SL AMB EGFR NON AFRICAN AMERICAN: 77 ML/MIN/1.73M2
SODIUM SERPL-SCNC: 137 MMOL/L (ref 135–146)
TRIGL SERPL-MCNC: 310 MG/DL
TSH SERPL-ACNC: 0.55 MIU/L (ref 0.4–4.5)
WBC # BLD AUTO: 7.7 THOUSAND/UL (ref 3.8–10.8)

## 2018-11-26 ENCOUNTER — TELEPHONE (OUTPATIENT)
Dept: UROLOGY | Facility: MEDICAL CENTER | Age: 48
End: 2018-11-26

## 2018-11-26 NOTE — TELEPHONE ENCOUNTER
Pt calling- Elevated PSA located in [facility], scheduled 1/21 in [facility], is this appropriate? Please advise    [phone]

## 2018-12-13 ENCOUNTER — OFFICE VISIT (OUTPATIENT)
Dept: FAMILY MEDICINE CLINIC | Facility: CLINIC | Age: 48
End: 2018-12-13
Payer: COMMERCIAL

## 2018-12-13 VITALS
WEIGHT: 240 LBS | TEMPERATURE: 98.7 F | HEIGHT: 71 IN | RESPIRATION RATE: 18 BRPM | HEART RATE: 108 BPM | OXYGEN SATURATION: 98 % | SYSTOLIC BLOOD PRESSURE: 140 MMHG | DIASTOLIC BLOOD PRESSURE: 80 MMHG | BODY MASS INDEX: 33.6 KG/M2

## 2018-12-13 DIAGNOSIS — J20.9 ACUTE BRONCHITIS, UNSPECIFIED ORGANISM: Primary | ICD-10-CM

## 2018-12-13 PROCEDURE — 1036F TOBACCO NON-USER: CPT | Performed by: FAMILY MEDICINE

## 2018-12-13 PROCEDURE — 3008F BODY MASS INDEX DOCD: CPT | Performed by: FAMILY MEDICINE

## 2018-12-13 PROCEDURE — 99213 OFFICE O/P EST LOW 20 MIN: CPT | Performed by: FAMILY MEDICINE

## 2018-12-13 RX ORDER — AZITHROMYCIN 250 MG/1
TABLET, FILM COATED ORAL
Qty: 6 TABLET | Refills: 0 | Status: SHIPPED | OUTPATIENT
Start: 2018-12-13 | End: 2018-12-17

## 2018-12-13 RX ORDER — BENZONATATE 200 MG/1
200 CAPSULE ORAL 3 TIMES DAILY PRN
Qty: 20 CAPSULE | Refills: 0 | Status: SHIPPED | OUTPATIENT
Start: 2018-12-13 | End: 2019-02-18

## 2018-12-13 NOTE — PROGRESS NOTES
Assessment/Plan:  Patient will be started on a Z-Elias and Tessalon Perles 200 mg 1 t i d  p r n  Altagraciae Nissen Patient instructed to take Mucinex, increase fluids and rest   Return to the office in 1 week or sooner p r n  Karrie Nissen Diagnoses and all orders for this visit:    Acute bronchitis, unspecified organism  Comments:  Z-Elias and Tessalon Perles p r n     Mucinex p r n , increase fluids and rest   Orders:  -     azithromycin (ZITHROMAX) 250 mg tablet; Take 2 tablets today then 1 tablet daily x 4 days  -     benzonatate (TESSALON) 200 MG capsule; Take 1 capsule (200 mg total) by mouth 3 (three) times a day as needed for cough          Subjective:      Patient ID: Eduardo Hong is a 46 y o  male  Patient started 3 weeks ago with sore throat then developed nasal congestion productive of thick green mucus and now complains of productive cough  He denies fever  He has treated this with Coricidin HBP without significant relief  Cough   This is a new problem  The current episode started 1 to 4 weeks ago  The problem has been gradually worsening  The cough is productive of purulent sputum  Associated symptoms include headaches, nasal congestion, postnasal drip and a sore throat  Pertinent negatives include no chest pain, chills, ear pain, fever, hemoptysis, shortness of breath, sweats or wheezing  He has tried OTC cough suppressant (corisidan HBP) for the symptoms  The treatment provided mild relief  There is no history of asthma, COPD or pneumonia  Fatigue   Associated symptoms include coughing, fatigue, headaches and a sore throat  Pertinent negatives include no chest pain, chills or fever  URI    Associated symptoms include coughing, headaches and a sore throat  Pertinent negatives include no chest pain, ear pain or wheezing         The following portions of the patient's history were reviewed and updated as appropriate: allergies, current medications, past family history, past medical history, past social history, past surgical history and problem list     Review of Systems   Constitutional: Positive for fatigue  Negative for chills and fever  HENT: Positive for postnasal drip and sore throat  Negative for ear pain  Respiratory: Positive for cough  Negative for hemoptysis, shortness of breath and wheezing  Cardiovascular: Negative for chest pain  Neurological: Positive for headaches  Objective:      /80   Pulse (!) 108   Temp 98 7 °F (37 1 °C) (Tympanic)   Resp 18   Ht 5' 11" (1 803 m)   Wt 109 kg (240 lb)   SpO2 98%   BMI 33 47 kg/m²          Physical Exam   Constitutional: He is oriented to person, place, and time  He appears well-developed and well-nourished  No distress  HENT:   Head: Normocephalic  Right Ear: External ear normal    Left Ear: External ear normal    Positive turbinates swelling with mucoid drainage  Throat postnasal drainage and injected  Mucous membranes moist    Eyes: Conjunctivae are normal  No scleral icterus  Neck: Neck supple  Cardiovascular: Normal rate and regular rhythm  Pulmonary/Chest: Effort normal  No respiratory distress  He has no wheezes  Coarse breath sounds throughout  Abdominal: Soft  There is no tenderness  Musculoskeletal: He exhibits no edema  Lymphadenopathy:     He has no cervical adenopathy  Neurological: He is alert and oriented to person, place, and time  Skin: Skin is warm and dry  Psychiatric: He has a normal mood and affect

## 2018-12-20 ENCOUNTER — TELEPHONE (OUTPATIENT)
Dept: FAMILY MEDICINE CLINIC | Facility: CLINIC | Age: 48
End: 2018-12-20

## 2018-12-20 DIAGNOSIS — J20.9 ACUTE BRONCHITIS, UNSPECIFIED ORGANISM: Primary | ICD-10-CM

## 2018-12-20 RX ORDER — AZITHROMYCIN 250 MG/1
TABLET, FILM COATED ORAL
Qty: 6 TABLET | Refills: 0 | Status: SHIPPED | OUTPATIENT
Start: 2018-12-20 | End: 2018-12-24

## 2018-12-20 NOTE — TELEPHONE ENCOUNTER
Call patient and recommend he repeat Z-Elias and take Mucinex NORAH Ward Pres[place] [facility] [place] [address]  [facility] [place]

## 2018-12-20 NOTE — TELEPHONE ENCOUNTER
Melonie Avendaño called because he was seen last week for a cold and cough  He said he felt it went away and finished taking the zpak, he has one more brittanie pill to take today, but he feels his cough and cold are coming back    He is wondering if something else can be called in?

## 2019-01-08 ENCOUNTER — OFFICE VISIT (OUTPATIENT)
Dept: UROLOGY | Facility: HOSPITAL | Age: 49
End: 2019-01-08
Payer: COMMERCIAL

## 2019-01-08 VITALS
SYSTOLIC BLOOD PRESSURE: 160 MMHG | HEIGHT: 71 IN | DIASTOLIC BLOOD PRESSURE: 102 MMHG | WEIGHT: 238.2 LBS | HEART RATE: 100 BPM | BODY MASS INDEX: 33.35 KG/M2

## 2019-01-08 DIAGNOSIS — N52.9 ERECTILE DYSFUNCTION, UNSPECIFIED ERECTILE DYSFUNCTION TYPE: ICD-10-CM

## 2019-01-08 DIAGNOSIS — R97.20 ELEVATED PSA: Primary | ICD-10-CM

## 2019-01-08 DIAGNOSIS — N20.0 CALCULUS OF KIDNEY: ICD-10-CM

## 2019-01-08 DIAGNOSIS — R39.15 URGENCY OF URINATION: ICD-10-CM

## 2019-01-08 PROCEDURE — 99215 OFFICE O/P EST HI 40 MIN: CPT | Performed by: UROLOGY

## 2019-01-08 RX ORDER — CIPROFLOXACIN 500 MG/1
500 TABLET, FILM COATED ORAL 2 TIMES DAILY
Qty: 6 TABLET | Refills: 0 | Status: SHIPPED | OUTPATIENT
Start: 2019-01-08 | End: 2019-01-11

## 2019-01-08 RX ORDER — OXYBUTYNIN CHLORIDE 10 MG/1
10 TABLET, EXTENDED RELEASE ORAL DAILY
Qty: 30 TABLET | Refills: 1 | Status: SHIPPED | OUTPATIENT
Start: 2019-01-08 | End: 2019-03-04 | Stop reason: SDUPTHER

## 2019-01-08 RX ORDER — SILDENAFIL CITRATE 20 MG/1
60 TABLET ORAL DAILY PRN
Qty: 90 TABLET | Refills: 3 | Status: SHIPPED | OUTPATIENT
Start: 2019-01-08 | End: 2020-04-15

## 2019-01-08 NOTE — ASSESSMENT & PLAN NOTE
We discussed prostate cancer screening and PSA at depth  We discussed that the only way to tell whether he has prostate cancer or not is with biopsy  We discussed the procedure of a prostate biopsy including the risks and benefits  We'll also recheck his PSA prior to biopsy to make sure it was not a spurious value  We will schedule the biopsy in the near future  Instructions, antibiotics, and stool softeners were given for the procedure

## 2019-01-08 NOTE — ASSESSMENT & PLAN NOTE
Generic Sildenafil was prescribed  Side effects were discussed  We will see how this medication is working at his next visit

## 2019-01-08 NOTE — PROGRESS NOTES
Assessment/Plan:    Nephrolithiasis  KUB and retroperitoneal ultrasound were ordered  We will review these at his next visit  Elevated PSA    We discussed prostate cancer screening and PSA at depth  We discussed that the only way to tell whether he has prostate cancer or not is with biopsy  We discussed the procedure of a prostate biopsy including the risks and benefits  We'll also recheck his PSA prior to biopsy to make sure it was not a spurious value  We will schedule the biopsy in the near future  Instructions, antibiotics, and stool softeners were given for the procedure  Erectile dysfunction  Generic Sildenafil was prescribed  Side effects were discussed  We will see how this medication is working at his next visit  Urgency of urination  Oxybutynin was prescribed  Side effects were discussed  We will see how this medication is working at his next visit  Diagnoses and all orders for this visit:    Elevated PSA  -     Ambulatory referral to Urology  -     ciprofloxacin (CIPRO) 500 mg tablet; Take 1 tablet (500 mg total) by mouth 2 (two) times a day for 3 days Start medicine the day before planned procedure  -     bisacodyl (DULCOLAX) 5 mg EC tablet; Take 2 tablets (10 mg total) by mouth once for 1 dose Take the day before procedure  -     PSA Total, Diagnostic; Future    Erectile dysfunction, unspecified erectile dysfunction type  -     sildenafil (REVATIO) 20 mg tablet; Take 3 tablets (60 mg total) by mouth daily as needed (ED)    Urgency of urination  -     oxybutynin (DITROPAN-XL) 10 MG 24 hr tablet; Take 1 tablet (10 mg total) by mouth daily    Calculus of kidney  -     US retroperitoneal complete; Future  -     XR abdomen 1 view kub; Future          Total visit time was 40 minutes of which over 50% was spent on counseling  Subjective:     Patient ID: Desiree Edward is a 46 y o  male    77-year-old male with a history of nephrolithiasis presents for follow-up    He recently had his 1st PSA test and was found to be significantly elevated at 7 4  He denies any family history of prostate cancer  He states he recently passed 2 kidney stones  The last 1 was 3 weeks ago  Unfortunately he did not bring it in today for analysis  He also has severe lower urinary tract symptoms as detailed below  I previously placed him on VESIcare and he had good results with this medication but never followed up with it  He is hoping to try something again  Finally complains of erectile dysfunction that has been going on for years  He is wondering if there are any medications available for this  He has no other complaints  The following portions of the patient's history were reviewed and updated as appropriate: allergies, current medications, past family history, past medical history, past social history, past surgical history and problem list     Review of Systems   Constitutional: Negative  HENT: Negative  Eyes: Negative  Respiratory: Negative  Cardiovascular: Negative  Gastrointestinal: Negative  Endocrine: Negative  Genitourinary:        As noted per HPI   Musculoskeletal: Negative  Skin: Negative  Allergic/Immunologic: Negative  Neurological: Negative  Hematological: Negative  Psychiatric/Behavioral: Negative  AUA SYMPTOM SCORE      Most Recent Value   AUA SYMPTOM SCORE   How often have you had a sensation of not emptying your bladder completely after you finished urinating? 1   How often have you had to urinate again less than two hours after you finished urinating? 4   How often have you found you stopped and started again several times when you urinate? 2   How often have you found it difficult to postpone urination? 4   How often have you had a weak urinary stream?  2   How often have you had to push or strain to begin urination?   2   How many times did you most typically get up to urinate from the time you went to bed at night until the time you got up in the morning? 3   Quality of Life: If you were to spend the rest of your life with your urinary condition just the way it is now, how would you feel about that?  3   AUA SYMPTOM SCORE  18          Urinary Incontinence Screening      Most Recent Value   Urinary Incontinence   Urinary Incontinence? No   Incomplete emptying? Yes   Urinary frequency? Yes   Urinary urgency? Yes   Urinary hesitancy? No   Dysuria (painful difficult urination)? No   Nocturia (waking up to use the bathroom)? Yes [3x]   Straining (having to push to go)? Yes   Weak stream?  Yes [at times]   Intermittent stream?  Yes          Objective:    Physical Exam   Constitutional: He is oriented to person, place, and time  He appears well-developed and well-nourished  Neck: Normal range of motion  Cardiovascular: Intact distal pulses  Pulmonary/Chest: Effort normal    Abdominal: Soft  Bowel sounds are normal  He exhibits no distension and no mass  There is no tenderness  There is no rebound and no guarding  Genitourinary: Rectum normal    Genitourinary Comments: I was unable to palpate the entire prostate but the apex was without nodules  His prostate is somewhat enlarged  Musculoskeletal: Normal range of motion  Neurological: He is alert and oriented to person, place, and time  Skin: Skin is warm and dry  Psychiatric: He has a normal mood and affect  Vitals reviewed          Results  Lab Results   Component Value Date    PSA 7 4 (H) 11/15/2018     Lab Results   Component Value Date    GLUCOSE 349 (H) 12/19/2014    CALCIUM 9 8 11/15/2018     09/02/2016    K 4 1 11/15/2018    CO2 28 11/15/2018    CL 99 11/15/2018    BUN 20 11/15/2018    CREATININE 1 14 05/19/2017     Lab Results   Component Value Date    WBC 7 7 11/15/2018    HGB 13 6 11/15/2018    HCT 38 0 (L) 11/15/2018    MCV 87 6 11/15/2018     11/15/2018       No results found for this or any previous visit (from the past 1 hour(s)) ]

## 2019-01-08 NOTE — ASSESSMENT & PLAN NOTE
Oxybutynin was prescribed  Side effects were discussed  We will see how this medication is working at his next visit

## 2019-01-12 ENCOUNTER — HOSPITAL ENCOUNTER (OUTPATIENT)
Dept: ULTRASOUND IMAGING | Facility: HOSPITAL | Age: 49
Discharge: HOME | End: 2019-01-12
Attending: UROLOGY
Payer: COMMERCIAL

## 2019-01-12 ENCOUNTER — APPOINTMENT (OUTPATIENT)
Dept: LAB | Facility: HOSPITAL | Age: 49
End: 2019-01-12
Attending: UROLOGY
Payer: COMMERCIAL

## 2019-01-12 ENCOUNTER — HOSPITAL ENCOUNTER (OUTPATIENT)
Dept: RADIOLOGY | Facility: HOSPITAL | Age: 49
Discharge: HOME | End: 2019-01-12
Attending: UROLOGY
Payer: COMMERCIAL

## 2019-01-12 DIAGNOSIS — N20.0 CALCULUS OF KIDNEY: ICD-10-CM

## 2019-01-12 DIAGNOSIS — R97.20 ELEVATED PSA: ICD-10-CM

## 2019-01-12 LAB — PSA SERPL-MCNC: 0.8 NG/ML (ref 0–4)

## 2019-01-12 PROCEDURE — 76770 US EXAM ABDO BACK WALL COMP: CPT

## 2019-01-12 PROCEDURE — 74018 RADEX ABDOMEN 1 VIEW: CPT

## 2019-01-12 PROCEDURE — 84153 ASSAY OF PSA TOTAL: CPT

## 2019-01-20 ENCOUNTER — TELEPHONE (OUTPATIENT)
Dept: UROLOGY | Facility: AMBULATORY SURGERY CENTER | Age: 49
End: 2019-01-20

## 2019-01-20 NOTE — TELEPHONE ENCOUNTER
Please call the patient and let them know his PSA came back normal   Please change his biopsy appointment to a normal appointment so we can discuss the new medications we placed him on

## 2019-01-21 NOTE — TELEPHONE ENCOUNTER
Called and talked to Shilpa Rubio reiterated Dr Gilman Duverney comments that his PSA was normal and there was no need to do a biopsy, Reiterated his appt time and canceled his 2 week follow up

## 2019-02-18 ENCOUNTER — TELEPHONE (OUTPATIENT)
Dept: GASTROENTEROLOGY | Facility: CLINIC | Age: 49
End: 2019-02-18

## 2019-02-18 ENCOUNTER — OFFICE VISIT (OUTPATIENT)
Dept: FAMILY MEDICINE CLINIC | Facility: CLINIC | Age: 49
End: 2019-02-18
Payer: COMMERCIAL

## 2019-02-18 VITALS
RESPIRATION RATE: 16 BRPM | SYSTOLIC BLOOD PRESSURE: 136 MMHG | WEIGHT: 245 LBS | HEART RATE: 88 BPM | DIASTOLIC BLOOD PRESSURE: 92 MMHG | HEIGHT: 71 IN | BODY MASS INDEX: 34.3 KG/M2 | TEMPERATURE: 98.2 F

## 2019-02-18 DIAGNOSIS — F41.9 ANXIETY: ICD-10-CM

## 2019-02-18 DIAGNOSIS — F32.9 REACTIVE DEPRESSION: ICD-10-CM

## 2019-02-18 DIAGNOSIS — E66.9 CLASS 1 OBESITY WITH SERIOUS COMORBIDITY AND BODY MASS INDEX (BMI) OF 32.0 TO 32.9 IN ADULT, UNSPECIFIED OBESITY TYPE: ICD-10-CM

## 2019-02-18 DIAGNOSIS — E78.5 HYPERLIPIDEMIA, UNSPECIFIED HYPERLIPIDEMIA TYPE: ICD-10-CM

## 2019-02-18 DIAGNOSIS — E13.8 DIABETES MELLITUS OF OTHER TYPE WITH COMPLICATION, UNSPECIFIED WHETHER LONG TERM INSULIN USE: Primary | ICD-10-CM

## 2019-02-18 DIAGNOSIS — I10 ESSENTIAL HYPERTENSION: Primary | ICD-10-CM

## 2019-02-18 DIAGNOSIS — I10 ESSENTIAL HYPERTENSION: ICD-10-CM

## 2019-02-18 LAB — SL AMB POCT HEMOGLOBIN AIC: 8.4 (ref ?–6.5)

## 2019-02-18 PROCEDURE — 3045F PR MOST RECENT HEMOGLOBIN A1C LEVEL 7.0-9.0%: CPT | Performed by: FAMILY MEDICINE

## 2019-02-18 PROCEDURE — 99214 OFFICE O/P EST MOD 30 MIN: CPT | Performed by: FAMILY MEDICINE

## 2019-02-18 PROCEDURE — 83036 HEMOGLOBIN GLYCOSYLATED A1C: CPT | Performed by: FAMILY MEDICINE

## 2019-02-18 RX ORDER — GABAPENTIN 100 MG/1
CAPSULE ORAL 3 TIMES DAILY
Status: CANCELLED | OUTPATIENT
Start: 2019-02-18

## 2019-02-18 RX ORDER — SERTRALINE HYDROCHLORIDE 100 MG/1
200 TABLET, FILM COATED ORAL DAILY
Qty: 180 TABLET | Refills: 1 | Status: CANCELLED | OUTPATIENT
Start: 2019-02-18

## 2019-02-18 NOTE — TELEPHONE ENCOUNTER
Took call from patient requesting refill for Valsartan and Gabapentin  Patient unable to confirm Valsartan dosage (not home at the moment), and he currently takes 3 cap of 100 mg of Gabapentin three times a day

## 2019-02-18 NOTE — PROGRESS NOTES
Assessment/Plan:  Fingerstick hemoglobin A1c at 8 4  Patient to continue present treatment  Patient instructed to follow a low-fat, low-salt and a low sugar/carbohydrate diet more carefully and get regular exercise walking 150 minutes per week as tolerated  Weight loss strongly encouraged and discussed losing 10 percent of body weight or approximately 24 pounds over the next 3-6 months  Recommend home glucose monitoring daily  Patient instructed to schedule diabetic eye exam and colonoscopy  Return to the office in 3 months  Diagnoses and all orders for this visit:    Diabetes mellitus of other type with complication, unspecified whether long term insulin use (HCC)  -     sitaGLIPtin-metFORMIN (JANUMET)  MG per tablet; Take 2 tablets by mouth daily  -     POCT hemoglobin A1c    Essential hypertension    Hyperlipidemia, unspecified hyperlipidemia type    Reactive depression  Comments:  Increase sertraline to 200 mg daily  Class 1 obesity with serious comorbidity and body mass index (BMI) of 32 0 to 32 9 in adult, unspecified obesity type    Anxiety    Other orders  -     Cancel: sertraline (ZOLOFT) 100 mg tablet; Take 2 tablets (200 mg total) by mouth daily          Subjective:      Patient ID: Angelika Nguyen is a 46 y o  male  Patient is here for routine appointment for chronic conditions and fasting labs  We reviewed fasting labs from last appointment  Patient has been feeling fairly well overall and anxiety and depression are significantly improved  No regular exercise program and patient admits to weight gain  He is less active during the winter  Home glucose monitoring done occasionally reveals fasting blood sugars around 180  Patient has not scheduled colonoscopy or diabetic eye exam yet but plans to do so  He did see Urology regarding elevated PSA which came back to normal on repeat  Diabetes   He presents for his follow-up diabetic visit  He has type 2 diabetes mellitus   His disease course has been stable  There are no hypoglycemic associated symptoms  Pertinent negatives for hypoglycemia include no headaches  Associated symptoms include foot paresthesias  Pertinent negatives for diabetes include no blurred vision, no chest pain, no fatigue, no foot ulcerations, no polydipsia, no polyuria, no visual change, no weakness and no weight loss  There are no hypoglycemic complications  Symptoms are stable  Diabetic complications include peripheral neuropathy  Pertinent negatives for diabetic complications include no CVA, heart disease or nephropathy  Risk factors for coronary artery disease include family history, dyslipidemia, diabetes mellitus, hypertension, male sex and obesity  Current diabetic treatment includes oral agent (triple therapy)  He is compliant with treatment all of the time  His weight is stable  He is following a generally healthy diet  He rarely participates in exercise  There is no change in his home blood glucose trend  His breakfast blood glucose is taken between 6-7 am  His breakfast blood glucose range is generally 180-200 mg/dl  An ACE inhibitor/angiotensin II receptor blocker is being taken  He does not see a podiatrist Eye exam is not current  Hypertension   This is a chronic problem  The problem is controlled  Pertinent negatives include no anxiety, blurred vision, chest pain, headaches, orthopnea, palpitations, peripheral edema, PND or shortness of breath  Past treatments include angiotensin blockers, diuretics and calcium channel blockers  The current treatment provides significant improvement  Compliance problems include exercise and diet  There is no history of CAD/MI or CVA         The following portions of the patient's history were reviewed and updated as appropriate: allergies, current medications, past family history, past medical history, past social history, past surgical history and problem list     Review of Systems   Constitutional: Negative for fatigue and weight loss  Eyes: Negative for blurred vision  Respiratory: Negative for shortness of breath  Cardiovascular: Negative for chest pain, palpitations, orthopnea and PND  Endocrine: Negative for polydipsia and polyuria  Neurological: Negative for weakness and headaches  Objective:      /92   Pulse 88   Temp 98 2 °F (36 8 °C) (Tympanic)   Resp 16   Ht 5' 11" (1 803 m)   Wt 111 kg (245 lb)   BMI 34 17 kg/m²          Physical Exam   Constitutional: He is oriented to person, place, and time  He appears well-developed and well-nourished  HENT:   Head: Normocephalic  Right Ear: External ear normal    Left Ear: External ear normal    Nose: Nose normal    Mouth/Throat: Oropharynx is clear and moist    Eyes: Conjunctivae are normal  No scleral icterus  Neck: Neck supple  No thyromegaly present  Cardiovascular: Normal rate and regular rhythm  Pulmonary/Chest: Effort normal and breath sounds normal    Abdominal: Soft  There is no tenderness  Musculoskeletal: He exhibits no edema  Lymphadenopathy:     He has no cervical adenopathy  Neurological: He is alert and oriented to person, place, and time  Skin: Skin is warm and dry  Psychiatric: He has a normal mood and affect

## 2019-02-18 NOTE — ASSESSMENT & PLAN NOTE
Lab Results   Component Value Date    HGBA1C 8 4 (A) 02/18/2019    Fingerstick hemoglobin A1c slightly improved at 8 4  Discussed goal of 7 0  Patient to continue present treatment and follow a low sugar and low-carbohydrate diet more carefully  Recommend home glucose monitoring daily  Schedule diabetic eye exam     No results for input(s): POCGLU in the last 72 hours      Blood Sugar Average: Last 72 hrs:

## 2019-02-18 NOTE — TELEPHONE ENCOUNTER
02/18/19  Screened by: Lisa Flores    Referring Provider     Pre- Screening: There is no height or weight on file to calculate BMI  Has patient been referred for a routine screening Colonoscopy? yes  Is the patient between 39-70 years old? yes    SCHEDULING STAFF   If the patient is between 45yrs-49yrs, please advise patient to confirm benefits/coverage with their insurance company for a routine screening colonoscopy, some insurance carriers will only cover at [id number] or older   If the patient is over 66years old, please schedule an office visit  Do you have any of the following symptoms? no    Have you had a coronary or vascular stent within the last year? no    Have you had a heart attack or stroke in the last 6 months? no    Have you had intestinal surgery in the last 3 months? no      Have you been hospitalized in the last Month? no    Have you been diagnosed with a bleeding disorder or anemia? no    Have you had chest pain (angina) or breathing problems  (COPD) in the last 3 months? no    Do you have any difficulty walking up a flight of stairs? no    Have you had Kidney failure or insufficiency? no    Have you had heart valve surgery? no    Are you confined to a wheelchair? no    Do you take Other blood thinning medications no    Have you been diagnosed with Diabetes or are you taking any   Diabetic medications? yes    : If patient answers NO to medical questions, then schedule procedure  If patient answers YES to medical questions, then schedule office appointment  Previous Colonoscopy no  Date and Facility of last colonoscopy?      Comments: failed oa---diabetic

## 2019-02-19 ENCOUNTER — TELEPHONE (OUTPATIENT)
Dept: FAMILY MEDICINE CLINIC | Facility: CLINIC | Age: 49
End: 2019-02-19

## 2019-02-19 DIAGNOSIS — E11.42 DIABETIC PERIPHERAL NEUROPATHY (HCC): Primary | ICD-10-CM

## 2019-02-19 RX ORDER — VALSARTAN AND HYDROCHLOROTHIAZIDE 320; 12.5 MG/1; MG/1
1 TABLET, FILM COATED ORAL DAILY
Qty: 30 TABLET | Refills: 5 | Status: SHIPPED | OUTPATIENT
Start: 2019-02-19 | End: 2021-01-29 | Stop reason: SDUPTHER

## 2019-02-19 RX ORDER — GABAPENTIN 300 MG/1
300 CAPSULE ORAL 3 TIMES DAILY
Qty: 90 CAPSULE | Refills: 3 | Status: SHIPPED | OUTPATIENT
Start: 2019-02-19 | End: 2019-09-25 | Stop reason: SDUPTHER

## 2019-02-19 NOTE — TELEPHONE ENCOUNTER
Pt called and stated he found his old bottle of Gabapentin and the information is as follows:  Gabapentin 300 mg capsules  Take 1 capsule three times a day    He is requesting a 30 day supply with refills to [facility]     (For reference he used to use [facility] based out [facility], if we need to call them the rx number was [id number] and the phone numbers he gave were [phone] or [phone] )

## 2019-02-19 NOTE — TELEPHONE ENCOUNTER
Spoke with [facility] and they do not have either of these medications on his file  He is 100% positive about his Valsartan-hctz dose and he wants a 30 day supply  He is going to try to find out where he last got his gabapentin refilled, as he thinks it may have been a mail-order  I advised that we can not fill it for him until he is able to confirm his dose and instructions on how often he was taking it

## 2019-03-04 ENCOUNTER — OFFICE VISIT (OUTPATIENT)
Dept: UROLOGY | Facility: HOSPITAL | Age: 49
End: 2019-03-04
Payer: COMMERCIAL

## 2019-03-04 VITALS — WEIGHT: 243.4 LBS | BODY MASS INDEX: 34.07 KG/M2 | HEIGHT: 71 IN

## 2019-03-04 DIAGNOSIS — R39.15 URGENCY OF URINATION: ICD-10-CM

## 2019-03-04 DIAGNOSIS — N20.0 NEPHROLITHIASIS: Primary | ICD-10-CM

## 2019-03-04 PROCEDURE — 99214 OFFICE O/P EST MOD 30 MIN: CPT | Performed by: UROLOGY

## 2019-03-04 PROCEDURE — 82360 CALCULUS ASSAY QUANT: CPT | Performed by: UROLOGY

## 2019-03-04 RX ORDER — OXYBUTYNIN CHLORIDE 10 MG/1
10 TABLET, EXTENDED RELEASE ORAL DAILY
Qty: 90 TABLET | Refills: 3 | Status: SHIPPED | OUTPATIENT
Start: 2019-03-04 | End: 2021-11-17

## 2019-03-04 RX ORDER — OXYBUTYNIN CHLORIDE 10 MG/1
10 TABLET, EXTENDED RELEASE ORAL DAILY
Qty: 90 TABLET | Refills: 3 | Status: SHIPPED | OUTPATIENT
Start: 2019-03-04 | End: 2019-03-04 | Stop reason: SDUPTHER

## 2019-03-04 NOTE — PROGRESS NOTES
Assessment/Plan:    Urgency of urination  The patient is doing well with oxybutynin  We will continue this medication  Nephrolithiasis  We will send his stone for analysis  We will proceed with metabolic workup including blood work and 24 hr urine collection  We reviewed his imaging that demonstrated a 5 mm right-sided stone  The patient thinks he passed the stone  He will need repeat imaging in 1 year  Diagnoses and all orders for this visit:    Nephrolithiasis  -     XR abdomen 1 view kub; Future  -     Magnesium; Future  -     Phosphorus; Future  -     PTH, intact; Future  -     Stone risk profile; Future  -     Uric acid; Future    Urgency of urination  -     Discontinue: oxybutynin (DITROPAN-XL) 10 MG 24 hr tablet; Take 1 tablet (10 mg total) by mouth daily  -     Discontinue: oxybutynin (DITROPAN-XL) 10 MG 24 hr tablet; Take 1 tablet (10 mg total) by mouth daily  -     oxybutynin (DITROPAN-XL) 10 MG 24 hr tablet; Take 1 tablet (10 mg total) by mouth daily          Total visit time was 25 minutes of which over 50% was spent on counseling  Subjective:     Patient ID: Desiree Edward is a 46 y o  male    57-year-old male presents for follow-up of nephrolithiasis, erectile dysfunction, elevated PSA, and lower urinary tract symptoms  The patient states that the oxybutynin is helping his urinary symptoms tremendously  He denies any problems with dry mouth  The generic sildenafil is also working well for his erectile dysfunction  He did pass some more stones and has brought them in for analysis  He currently denies any gross hematuria or flank pain  He has no other complaints  The following portions of the patient's history were reviewed and updated as appropriate: allergies, current medications, past family history, past medical history, past social history, past surgical history and problem list     Review of Systems   Constitutional: Negative  HENT: Negative  Eyes: Negative  Respiratory: Negative  Cardiovascular: Negative  Gastrointestinal: Negative  Endocrine: Negative  Genitourinary:        As noted per HPI   Musculoskeletal: Negative  Skin: Negative  Allergic/Immunologic: Negative  Neurological: Negative  Hematological: Negative  Psychiatric/Behavioral: Negative  AUA SYMPTOM SCORE      Most Recent Value   AUA SYMPTOM SCORE   How often have you had a sensation of not emptying your bladder completely after you finished urinating? 1   How often have you had to urinate again less than two hours after you finished urinating? 0   How often have you found you stopped and started again several times when you urinate?  0   How often have you found it difficult to postpone urination? 0   How often have you had a weak urinary stream?  0   How often have you had to push or strain to begin urination? 0   How many times did you most typically get up to urinate from the time you went to bed at night until the time you got up in the morning? 1   Quality of Life: If you were to spend the rest of your life with your urinary condition just the way it is now, how would you feel about that?  1   AUA SYMPTOM SCORE  2          Urinary Incontinence Screening      Most Recent Value   Urinary Incontinence   Urinary Incontinence? No   Incomplete emptying? No   Urinary frequency? No   Urinary urgency? No   Urinary hesitancy? No   Dysuria (painful difficult urination)? No   Nocturia (waking up to use the bathroom)? Yes [1 time]   Straining (having to push to go)? No   Weak stream?  No   Intermittent stream?  No   Post void dribbling? No          Objective:    Physical Exam   Constitutional: He is oriented to person, place, and time  He appears well-developed and well-nourished  Neck: Normal range of motion  Cardiovascular: Intact distal pulses  Pulmonary/Chest: Effort normal    Abdominal: Soft  Bowel sounds are normal  He exhibits no distension and no mass  There is no tenderness  There is no rebound and no guarding  Musculoskeletal: Normal range of motion  Neurological: He is alert and oriented to person, place, and time  Skin: Skin is warm and dry  Psychiatric: He has a normal mood and affect  Vitals reviewed          Results  Lab Results   Component Value Date    PSA 0 8 01/12/2019    PSA 7 4 (H) 11/15/2018     Lab Results   Component Value Date    GLUCOSE 349 (H) 12/19/2014    CALCIUM 9 8 11/15/2018     09/02/2016    K 4 1 11/15/2018    CO2 28 11/15/2018    CL 99 11/15/2018    BUN 20 11/15/2018    CREATININE 1 14 05/19/2017     Lab Results   Component Value Date    WBC 7 7 11/15/2018    HGB 13 6 11/15/2018    HCT 38 0 (L) 11/15/2018    MCV 87 6 11/15/2018     11/15/2018       No results found for this or any previous visit (from the past 1 hour(s)) ]

## 2019-03-04 NOTE — ASSESSMENT & PLAN NOTE
We will send his stone for analysis  We will proceed with metabolic workup including blood work and 24 hr urine collection  We reviewed his imaging that demonstrated a 5 mm right-sided stone  The patient thinks he passed the stone  He will need repeat imaging in 1 year

## 2019-03-11 LAB
CA PHOS MFR STONE: 15 %
CALCIUM OXALATE DIHYDRATE MFR STONE IR: 20 %
COLOR STONE: NORMAL
COM MFR STONE: 65 %
COMMENT-STONE3: NORMAL
COMPOSITION: NORMAL
LABORATORY COMMENT REPORT: NORMAL
NIDUS STONE QL: NORMAL
PHOTO: NORMAL
SIZE STONE: NORMAL MM
STONE ANALYSIS-IMP: NORMAL
WT STONE: 172.8 MG

## 2019-03-29 ENCOUNTER — CONSULT (OUTPATIENT)
Dept: GASTROENTEROLOGY | Facility: CLINIC | Age: 49
End: 2019-03-29
Payer: COMMERCIAL

## 2019-03-29 VITALS
TEMPERATURE: 98.1 F | BODY MASS INDEX: 33.63 KG/M2 | DIASTOLIC BLOOD PRESSURE: 108 MMHG | HEART RATE: 97 BPM | SYSTOLIC BLOOD PRESSURE: 167 MMHG | HEIGHT: 71 IN | WEIGHT: 240.2 LBS

## 2019-03-29 DIAGNOSIS — Z12.11 ENCOUNTER FOR SCREENING COLONOSCOPY: ICD-10-CM

## 2019-03-29 PROCEDURE — 99244 OFF/OP CNSLTJ NEW/EST MOD 40: CPT | Performed by: INTERNAL MEDICINE

## 2019-03-29 RX ORDER — SODIUM, POTASSIUM,MAG SULFATES 17.5-3.13G
180 SOLUTION, RECONSTITUTED, ORAL ORAL ONCE
Qty: 180 ML | Refills: 0 | Status: SHIPPED | OUTPATIENT
Start: 2019-03-29 | End: 2021-11-17

## 2019-04-05 ENCOUNTER — TELEPHONE (OUTPATIENT)
Dept: FAMILY MEDICINE CLINIC | Facility: CLINIC | Age: 49
End: 2019-04-05

## 2019-05-06 LAB
LEFT EYE DIABETIC RETINOPATHY: NORMAL
RIGHT EYE DIABETIC RETINOPATHY: NORMAL
SEVERITY (EYE EXAM): NORMAL

## 2019-05-15 ENCOUNTER — TELEPHONE (OUTPATIENT)
Dept: GASTROENTEROLOGY | Facility: MEDICAL CENTER | Age: 49
End: 2019-05-15

## 2019-05-15 ENCOUNTER — PREP FOR PROCEDURE (OUTPATIENT)
Dept: GASTROENTEROLOGY | Facility: MEDICAL CENTER | Age: 49
End: 2019-05-15

## 2019-05-15 DIAGNOSIS — Z12.11 ENCOUNTER FOR SCREENING COLONOSCOPY: Primary | ICD-10-CM

## 2019-05-20 ENCOUNTER — OFFICE VISIT (OUTPATIENT)
Dept: FAMILY MEDICINE CLINIC | Facility: CLINIC | Age: 49
End: 2019-05-20
Payer: COMMERCIAL

## 2019-05-20 VITALS
TEMPERATURE: 98.5 F | BODY MASS INDEX: 34.16 KG/M2 | SYSTOLIC BLOOD PRESSURE: 130 MMHG | DIASTOLIC BLOOD PRESSURE: 88 MMHG | RESPIRATION RATE: 16 BRPM | WEIGHT: 244 LBS | HEIGHT: 71 IN | HEART RATE: 92 BPM

## 2019-05-20 DIAGNOSIS — E55.9 VITAMIN D DEFICIENCY: ICD-10-CM

## 2019-05-20 DIAGNOSIS — E78.5 HYPERLIPIDEMIA, UNSPECIFIED HYPERLIPIDEMIA TYPE: ICD-10-CM

## 2019-05-20 DIAGNOSIS — E11.42 DIABETIC PERIPHERAL NEUROPATHY (HCC): ICD-10-CM

## 2019-05-20 DIAGNOSIS — F41.9 ANXIETY: ICD-10-CM

## 2019-05-20 DIAGNOSIS — E66.9 CLASS 1 OBESITY WITH SERIOUS COMORBIDITY AND BODY MASS INDEX (BMI) OF 32.0 TO 32.9 IN ADULT, UNSPECIFIED OBESITY TYPE: ICD-10-CM

## 2019-05-20 DIAGNOSIS — I10 ESSENTIAL HYPERTENSION: ICD-10-CM

## 2019-05-20 DIAGNOSIS — E13.8 DIABETES MELLITUS OF OTHER TYPE WITH COMPLICATION, UNSPECIFIED WHETHER LONG TERM INSULIN USE: Primary | ICD-10-CM

## 2019-05-20 LAB — SL AMB POCT HEMOGLOBIN AIC: 10.1 (ref ?–6.5)

## 2019-05-20 PROCEDURE — 3075F SYST BP GE 130 - 139MM HG: CPT | Performed by: FAMILY MEDICINE

## 2019-05-20 PROCEDURE — 3079F DIAST BP 80-89 MM HG: CPT | Performed by: FAMILY MEDICINE

## 2019-05-20 PROCEDURE — 99214 OFFICE O/P EST MOD 30 MIN: CPT | Performed by: FAMILY MEDICINE

## 2019-05-20 PROCEDURE — 3046F HEMOGLOBIN A1C LEVEL >9.0%: CPT | Performed by: FAMILY MEDICINE

## 2019-05-20 PROCEDURE — 3008F BODY MASS INDEX DOCD: CPT | Performed by: FAMILY MEDICINE

## 2019-05-20 PROCEDURE — 1036F TOBACCO NON-USER: CPT | Performed by: FAMILY MEDICINE

## 2019-05-20 PROCEDURE — 36415 COLL VENOUS BLD VENIPUNCTURE: CPT | Performed by: FAMILY MEDICINE

## 2019-05-20 PROCEDURE — 83036 HEMOGLOBIN GLYCOSYLATED A1C: CPT | Performed by: FAMILY MEDICINE

## 2019-05-20 RX ORDER — ALPRAZOLAM 0.25 MG/1
0.25 TABLET ORAL 3 TIMES DAILY PRN
Qty: 30 TABLET | Refills: 0 | Status: SHIPPED | OUTPATIENT
Start: 2019-05-20 | End: 2019-10-02 | Stop reason: SDUPTHER

## 2019-05-20 RX ORDER — GLIMEPIRIDE 2 MG/1
2 TABLET ORAL 2 TIMES DAILY WITH MEALS
Qty: 60 TABLET | Refills: 5 | Status: SHIPPED | OUTPATIENT
Start: 2019-05-20 | End: 2021-01-29 | Stop reason: SDUPTHER

## 2019-05-21 LAB
ALBUMIN SERPL-MCNC: 4.4 G/DL (ref 3.5–5.5)
ALBUMIN/GLOB SERPL: 1.5 {RATIO} (ref 1.2–2.2)
ALP SERPL-CCNC: 89 IU/L (ref 39–117)
ALT SERPL-CCNC: 27 IU/L (ref 0–44)
AST SERPL-CCNC: 27 IU/L (ref 0–40)
BILIRUB SERPL-MCNC: 1.1 MG/DL (ref 0–1.2)
BUN SERPL-MCNC: 28 MG/DL (ref 6–24)
BUN/CREAT SERPL: 21 (ref 9–20)
CALCIUM SERPL-MCNC: 10 MG/DL (ref 8.7–10.2)
CHLORIDE SERPL-SCNC: 97 MMOL/L (ref 96–106)
CHOLEST SERPL-MCNC: 179 MG/DL (ref 100–199)
CHOLEST/HDLC SERPL: 6.2 RATIO (ref 0–5)
CO2 SERPL-SCNC: 22 MMOL/L (ref 20–29)
CREAT SERPL-MCNC: 1.34 MG/DL (ref 0.76–1.27)
GLOBULIN SER-MCNC: 3 G/DL (ref 1.5–4.5)
GLUCOSE SERPL-MCNC: 239 MG/DL (ref 65–99)
HDLC SERPL-MCNC: 29 MG/DL
LDLC SERPL CALC-MCNC: ABNORMAL MG/DL (ref 0–99)
LDLC SERPL DIRECT ASSAY-MCNC: 98 MG/DL (ref 0–99)
POTASSIUM SERPL-SCNC: 3.7 MMOL/L (ref 3.5–5.2)
PROT SERPL-MCNC: 7.4 G/DL (ref 6–8.5)
SL AMB EGFR AFRICAN AMERICAN: 70 ML/MIN/1.73
SL AMB EGFR NON AFRICAN AMERICAN: 61 ML/MIN/1.73
SL AMB VLDL CHOLESTEROL CALC: ABNORMAL MG/DL (ref 5–40)
SODIUM SERPL-SCNC: 137 MMOL/L (ref 134–144)
TRIGL SERPL-MCNC: 464 MG/DL (ref 0–149)

## 2019-05-30 ENCOUNTER — PATIENT OUTREACH (OUTPATIENT)
Dept: ADMINISTRATIVE | Facility: OTHER | Age: 49
End: 2019-05-30

## 2019-06-26 ENCOUNTER — TELEPHONE (OUTPATIENT)
Dept: FAMILY MEDICINE CLINIC | Facility: CLINIC | Age: 49
End: 2019-06-26

## 2019-07-15 ENCOUNTER — ANESTHESIA EVENT (OUTPATIENT)
Dept: GASTROENTEROLOGY | Facility: HOSPITAL | Age: 49
End: 2019-07-15

## 2019-07-16 ENCOUNTER — ANESTHESIA (OUTPATIENT)
Dept: GASTROENTEROLOGY | Facility: HOSPITAL | Age: 49
End: 2019-07-16

## 2019-07-16 ENCOUNTER — HOSPITAL ENCOUNTER (OUTPATIENT)
Dept: GASTROENTEROLOGY | Facility: HOSPITAL | Age: 49
Setting detail: OUTPATIENT SURGERY
Discharge: HOME | End: 2019-07-16
Attending: INTERNAL MEDICINE | Admitting: INTERNAL MEDICINE
Payer: COMMERCIAL

## 2019-07-16 VITALS
TEMPERATURE: 98.2 F | SYSTOLIC BLOOD PRESSURE: 139 MMHG | OXYGEN SATURATION: 97 % | HEART RATE: 89 BPM | DIASTOLIC BLOOD PRESSURE: 83 MMHG | RESPIRATION RATE: 16 BRPM

## 2019-07-16 DIAGNOSIS — Z12.11 ENCOUNTER FOR SCREENING COLONOSCOPY: ICD-10-CM

## 2019-07-16 LAB — GLUCOSE SERPL-MCNC: 270 MG/DL (ref 65–140)

## 2019-07-16 PROCEDURE — 88305 TISSUE EXAM BY PATHOLOGIST: CPT | Performed by: PATHOLOGY

## 2019-07-16 PROCEDURE — 45380 COLONOSCOPY AND BIOPSY: CPT | Performed by: INTERNAL MEDICINE

## 2019-07-16 PROCEDURE — 82948 REAGENT STRIP/BLOOD GLUCOSE: CPT

## 2019-07-16 RX ORDER — PROPOFOL 10 MG/ML
INJECTION, EMULSION INTRAVENOUS CONTINUOUS PRN
Status: DISCONTINUED | OUTPATIENT
Start: 2019-07-16 | End: 2019-07-16 | Stop reason: SURG

## 2019-07-16 RX ORDER — PROPOFOL 10 MG/ML
INJECTION, EMULSION INTRAVENOUS AS NEEDED
Status: DISCONTINUED | OUTPATIENT
Start: 2019-07-16 | End: 2019-07-16 | Stop reason: SURG

## 2019-07-16 RX ORDER — LIDOCAINE HYDROCHLORIDE 10 MG/ML
INJECTION, SOLUTION INFILTRATION; PERINEURAL AS NEEDED
Status: DISCONTINUED | OUTPATIENT
Start: 2019-07-16 | End: 2019-07-16 | Stop reason: SURG

## 2019-07-16 RX ORDER — SODIUM CHLORIDE 9 MG/ML
INJECTION, SOLUTION INTRAVENOUS CONTINUOUS PRN
Status: DISCONTINUED | OUTPATIENT
Start: 2019-07-16 | End: 2019-07-16 | Stop reason: SURG

## 2019-07-16 RX ADMIN — LIDOCAINE HYDROCHLORIDE 100 MG: 10 INJECTION, SOLUTION INFILTRATION; PERINEURAL at 08:57

## 2019-07-16 RX ADMIN — PROPOFOL 30 MG: 10 INJECTION, EMULSION INTRAVENOUS at 09:05

## 2019-07-16 RX ADMIN — PROPOFOL 20 MG: 10 INJECTION, EMULSION INTRAVENOUS at 09:02

## 2019-07-16 RX ADMIN — SODIUM CHLORIDE: 0.9 INJECTION, SOLUTION INTRAVENOUS at 09:15

## 2019-07-16 RX ADMIN — Medication 1 MG: at 09:18

## 2019-07-16 RX ADMIN — SODIUM CHLORIDE: 0.9 INJECTION, SOLUTION INTRAVENOUS at 08:47

## 2019-07-16 RX ADMIN — PROPOFOL 30 MG: 10 INJECTION, EMULSION INTRAVENOUS at 09:10

## 2019-07-16 RX ADMIN — PROPOFOL 30 MG: 10 INJECTION, EMULSION INTRAVENOUS at 09:07

## 2019-07-16 RX ADMIN — PROPOFOL 100 MG: 10 INJECTION, EMULSION INTRAVENOUS at 08:57

## 2019-07-16 RX ADMIN — PROPOFOL 20 MG: 10 INJECTION, EMULSION INTRAVENOUS at 09:17

## 2019-07-16 RX ADMIN — PROPOFOL 120 MCG/KG/MIN: 10 INJECTION, EMULSION INTRAVENOUS at 08:57

## 2019-07-16 NOTE — H&P
History and Physical - [facility]  Amarilis Quintana 44 y o  male MRN: [id number]                  HPI: Amarilis Quintana is a 44y o  year old male who presents for colonscopy      REVIEW OF SYSTEMS: Per the HPI, and otherwise unremarkable  Historical Information   Past Medical History:   Diagnosis Date    Anxiety     Diabetes mellitus ([address] )     Hyperlipidemia     Hypertension     Kidney stone     Psychiatric disorder     Anxiety     Past Surgical History:   Procedure Laterality Date    EXTRACORPOREAL SHOCK WAVE LITHOTRIPSY      KNEE ARTHROSCOPY Left     torn meniscus    ID CYSTO/URETERO W/LITHOTRIPSY &INDWELL STENT INSRT Left 5/23/2017    Procedure: CYSTOSCOPY; URETEROSCOPY; HOLMIUM LASER; RETROGRADE PYELOGRAM; STENT PLACEMENT ;  Surgeon: Soledad Goodell, MD;  [facility];  Service: Urology    ID FRAGMENT KIDNEY STONE/ ESWL Left 5/18/2017    Procedure: ESWL ;  Surgeon: Eder Chow MD;  [facility];  Service: Urology     Social History   Social History     Substance and Sexual Activity   Alcohol Use Yes    Alcohol/week: 6 0 standard drinks    Types: 6 Glasses of wine per week    Comment: per week     Social History     Substance and Sexual Activity   Drug Use No     Social History     Tobacco Use   Smoking Status Never Smoker   Smokeless Tobacco Never Used     Family History   Problem Relation Age of Onset    Cancer Maternal Grandmother     Breast cancer Maternal Grandmother     Diabetes Mother     Diabetes Father     Hypertension Father        Meds/Allergies       (Not in a hospital admission)    No Known Allergies    Objective     Blood pressure 154/90, pulse 86, temperature 98 2 °F (36 8 °C), temperature source Oral, resp  rate 18, SpO2 94 %        PHYSICAL EXAM    Gen: NAD  CV: RRR  CHEST: Clear  ABD: soft, NT/ND  EXT: no edema      ASSESSMENT/PLAN:  This is a 44y o  year old male here for colonoscopy, and he is stable and optimized for his procedure

## 2019-07-16 NOTE — ANESTHESIA POSTPROCEDURE EVALUATION
Post-Op Assessment Note    CV Status:  Stable  Pain Score: 0    Pain management: adequate     Mental Status:  Alert and awake   Hydration Status:  Euvolemic   PONV Controlled:  Controlled   Airway Patency:  Patent   Post Op Vitals Reviewed: Yes      Staff: CRNA           /74 (07/16/19 0945)    Temp      Pulse 98 (07/16/19 0945)   Resp 16 (07/16/19 0945)    SpO2 96 % (07/16/19 0945)

## 2019-07-16 NOTE — ANESTHESIA PREPROCEDURE EVALUATION
Review of Systems/Medical History  Patient summary reviewed  Chart reviewed      Cardiovascular  Hyperlipidemia, Hypertension controlled,    Pulmonary  Negative pulmonary ROS        GI/Hepatic  Negative GI/hepatic ROS          Kidney stones,        Endo/Other  Diabetes poorly controlled type 2 Oral agent,   Comment: bs 270    GYN  Negative gynecology ROS          Hematology  Negative hematology ROS      Musculoskeletal  Negative musculoskeletal ROS        Neurology  Negative neurology ROS      Psychology   Anxiety, Depression ,              Physical Exam    Airway    Mallampati score: II  TM Distance: >3 FB  Neck ROM: full     Dental   No notable dental hx     Cardiovascular  Rhythm: regular, Rate: normal, Cardiovascular exam normal    Pulmonary  Pulmonary exam normal Breath sounds clear to auscultation,     Other Findings        Anesthesia Plan  ASA Score- 2     Anesthesia Type- IV sedation with anesthesia with ASA Monitors  Additional Monitors:   Airway Plan:         Plan Factors-    Induction- intravenous  Postoperative Plan- Plan for postoperative opioid use  Informed Consent- Anesthetic plan and risks discussed with patient  I personally reviewed this patient with the CRNA  Discussed and agreed on the Anesthesia Plan with the CRNA  Elvi Burgess

## 2019-08-27 ENCOUNTER — TELEPHONE (OUTPATIENT)
Dept: FAMILY MEDICINE CLINIC | Facility: CLINIC | Age: 49
End: 2019-08-27

## 2019-09-09 ENCOUNTER — OFFICE VISIT (OUTPATIENT)
Dept: URGENT CARE | Facility: CLINIC | Age: 49
End: 2019-09-09
Payer: COMMERCIAL

## 2019-09-09 ENCOUNTER — APPOINTMENT (OUTPATIENT)
Dept: RADIOLOGY | Facility: CLINIC | Age: 49
End: 2019-09-09
Payer: COMMERCIAL

## 2019-09-09 VITALS
RESPIRATION RATE: 16 BRPM | HEIGHT: 71 IN | TEMPERATURE: 99.4 F | OXYGEN SATURATION: 96 % | WEIGHT: 247 LBS | SYSTOLIC BLOOD PRESSURE: 183 MMHG | BODY MASS INDEX: 34.58 KG/M2 | DIASTOLIC BLOOD PRESSURE: 108 MMHG | HEART RATE: 104 BPM

## 2019-09-09 DIAGNOSIS — M79.645 PAIN OF FINGER OF LEFT HAND: Primary | ICD-10-CM

## 2019-09-09 DIAGNOSIS — M79.645 PAIN OF FINGER OF LEFT HAND: ICD-10-CM

## 2019-09-09 PROCEDURE — 99213 OFFICE O/P EST LOW 20 MIN: CPT | Performed by: PHYSICIAN ASSISTANT

## 2019-09-09 PROCEDURE — 29130 APPL FINGER SPLINT STATIC: CPT | Performed by: PHYSICIAN ASSISTANT

## 2019-09-09 PROCEDURE — 73130 X-RAY EXAM OF HAND: CPT

## 2019-09-09 NOTE — PROGRESS NOTES
[facility]        NAME: Amarilis Quintana is a 44 y o  male  : 1970    MRN: [id number]  DATE: 2019  TIME: 6:29 PM    Assessment and Plan   Pain of finger of left hand [M79 [id number]]  1  Pain of finger of left hand  XR hand 3+ vw left       Patient Instructions     Splint placed  Ortho appt scheduled  Recommend RICE, OTC ibuprofen  Follow up with PCP in 3-5 days  Proceed to  ER if symptoms worsen  Chief Complaint     Chief Complaint   Patient presents with    Finger Injury     Thursday pts left index finger got caught in his dogs collar  Pt states he felt a pull  Pt has pain with all movement, pain is currently 5/10  Pt denies numbness/tingling  Pt has been applying ice prn and taking motrin prn  BP elevated, pt states he did not take his BP medicine today  History of Present Illness       Patient presents with complaint of left 4th finger pain since Thursday  Pt states that he grabbed his dog's collar and the dog pulled away from him, catching and twisting the pt's ring finger  Pt states that there was some bruising but states that it has resolved  Pt states that the pain continues, however, as does the swelling  Pt reports his  strength is decreased and has pain with direct pressure on the fingertip  Pt denies paresthesias, radiation of pain, chest pain, dyspnea, fever, and chills  Pt reports icing  Review of Systems   Review of Systems   Constitutional: Negative for chills, fatigue and fever  HENT: Negative for congestion, ear pain, postnasal drip, rhinorrhea and sore throat  Respiratory: Negative for cough, chest tightness and shortness of breath  Cardiovascular: Negative for chest pain  Gastrointestinal: Negative for abdominal pain, blood in stool, diarrhea, nausea and vomiting  Musculoskeletal: Positive for joint swelling  Negative for myalgias, neck pain and neck stiffness  Skin: Negative for color change, rash and wound     Neurological: Negative for dizziness, weakness, light-headedness, numbness and headaches  All other systems reviewed and are negative          Current Medications       Current Outpatient Medications:     ALPRAZolam (XANAX) 0 25 mg tablet, Take 1 tablet (0 25 mg total) by mouth 3 (three) times a day as needed for anxiety, Disp: 30 tablet, Rfl: 0    amLODIPine (NORVASC) 5 mg tablet, Take 5 mg by mouth daily, Disp: , Rfl:     aspirin 81 MG tablet, Take 81 mg by mouth daily, Disp: , Rfl:     Blood Glucose Monitoring Suppl (ACCU-CHEK JESSY CONNECT) w/Device KIT, by Does not apply route, Disp: , Rfl:     Cholecalciferol (VITAMIN D3) 2000 UNITS TABS, Take 1 tablet by mouth daily, Disp: , Rfl:     gabapentin (NEURONTIN) 300 mg capsule, Take 1 capsule (300 mg total) by mouth 3 (three) times a day, Disp: 90 capsule, Rfl: 3    glimepiride (AMARYL) 2 mg tablet, Take 1 tablet (2 mg total) by mouth 2 (two) times a day with meals, Disp: 60 tablet, Rfl: 5    Omega-3 Fatty Acids (FISH OIL) 1,000 mg, Take 1 capsule by mouth daily, Disp: , Rfl:     oxybutynin (DITROPAN-XL) 10 MG 24 hr tablet, Take 1 tablet (10 mg total) by mouth daily, Disp: 90 tablet, Rfl: 3    sertraline (ZOLOFT) 100 mg tablet, Take 2 tablets (200 mg total) by mouth daily, Disp: 180 tablet, Rfl: 1    sildenafil (REVATIO) 20 mg tablet, Take 3 tablets (60 mg total) by mouth daily as needed (ED), Disp: 90 tablet, Rfl: 3    simvastatin (ZOCOR) 40 mg tablet, Take 40 mg by mouth daily at bedtime, Disp: , Rfl:     sitaGLIPtin-metFORMIN (JANUMET)  MG per tablet, Take 2 tablets by mouth daily, Disp: 180 tablet, Rfl: 1    valsartan-hydrochlorothiazide (DIOVAN-HCT) 320-12 5 MG per tablet, Take 1 tablet by mouth daily, Disp: 30 tablet, Rfl: 5    SUPREP BOWEL PREP KIT 17 5-3 13-1 6 GM/177ML SOLN, Take 180 mL by mouth once for 1 dose, Disp: 180 mL, Rfl: 0    Current Allergies     Allergies as of 09/09/2019    (No Known Allergies)            The following portions of the patient's history were reviewed and updated as appropriate: allergies, current medications, past family history, past medical history, past social history, past surgical history and problem list      Past Medical History:   Diagnosis Date    Anxiety     Diabetes mellitus ([address] )     Hyperlipidemia     Hypertension     Kidney stone     Psychiatric disorder     Anxiety       Past Surgical History:   Procedure Laterality Date    EXTRACORPOREAL SHOCK WAVE LITHOTRIPSY      KNEE ARTHROSCOPY Left     torn meniscus    CO CYSTO/URETERO W/LITHOTRIPSY &INDWELL STENT INSRT Left 5/23/2017    Procedure: CYSTOSCOPY; URETEROSCOPY; HOLMIUM LASER; RETROGRADE PYELOGRAM; STENT PLACEMENT ;  Surgeon: Natalie Chu MD;  [facility];  Service: Urology    CO FRAGMENT KIDNEY STONE/ ESWL Left 5/18/2017    Procedure: ESWL ;  Surgeon: Donald Grace MD;  [facility];  Service: Urology       Family History   Problem Relation Age of Onset    Cancer Maternal Grandmother     Breast cancer Maternal Grandmother     Diabetes Mother     Diabetes Father     Hypertension Father          Medications have been verified  Objective   BP (!) 183/108 (BP Location: Left arm, Patient Position: Sitting)   Pulse 104   Temp 99 4 °F (37 4 °C) (Tympanic)   Resp 16   Ht 5' 11" (1 803 m)   Wt 112 kg (247 lb)   SpO2 96%   BMI 34 45 kg/m²          Physical Exam     Physical Exam   Constitutional: He is oriented to person, place, and time  He appears well-developed and well-nourished  No distress  HENT:   Head: Normocephalic and atraumatic  Eyes: Pupils are equal, round, and reactive to light  Conjunctivae and EOM are normal    Neck: Normal range of motion  Neck supple  Cardiovascular: Normal rate, regular rhythm and normal heart sounds  Pulmonary/Chest: Effort normal and breath sounds normal  No respiratory distress  Musculoskeletal: Normal range of motion  He exhibits tenderness     Left 4th finger: diffuse swelling distally, TTP over middle phalanx; FAROM w/ discomfort; sensation intact; cap refill <2   Lymphadenopathy:     He has no cervical adenopathy  Neurological: He is alert and oriented to person, place, and time  No cranial nerve deficit or sensory deficit  Skin: Skin is warm and dry  Capillary refill takes less than 2 seconds  No rash noted  He is not diaphoretic  Psychiatric: He has a normal mood and affect  His behavior is normal  Thought content normal    Nursing note and vitals reviewed  Splint application  Date/Time: 9/9/2019 6:28 PM  Performed by: Sebastian Thomas PA-C  Authorized by: Sebastian Thomas PA-C     Consent:     Consent obtained:  Verbal    Consent given by:  Patient    Risks discussed:  Discoloration, numbness, pain and swelling    Alternatives discussed:  No treatment and delayed treatment  Pre-procedure details:     Sensation:  Normal    Skin color:  Pink  Procedure details:     Laterality:  Left    Location:  Finger    Finger:  L ring finger    Strapping: no      Splint type:  Finger splint, static  Post-procedure details:     Pain:  Unchanged    Sensation:  Normal    Skin color:  Pink    Patient tolerance of procedure: Tolerated well, no immediate complications  Comments:      Pt tolerated placement well  Neurovascularly intact

## 2019-09-12 ENCOUNTER — OFFICE VISIT (OUTPATIENT)
Dept: OBGYN CLINIC | Facility: CLINIC | Age: 49
End: 2019-09-12
Payer: COMMERCIAL

## 2019-09-12 VITALS
BODY MASS INDEX: 33.88 KG/M2 | SYSTOLIC BLOOD PRESSURE: 142 MMHG | WEIGHT: 242 LBS | HEART RATE: 88 BPM | DIASTOLIC BLOOD PRESSURE: 84 MMHG | HEIGHT: 71 IN

## 2019-09-12 DIAGNOSIS — S62.655A CLOSED NONDISPLACED FRACTURE OF MIDDLE PHALANX OF LEFT RING FINGER, INITIAL ENCOUNTER: Primary | ICD-10-CM

## 2019-09-12 PROCEDURE — 99203 OFFICE O/P NEW LOW 30 MIN: CPT | Performed by: ORTHOPAEDIC SURGERY

## 2019-09-12 PROCEDURE — 26740 TREAT FINGER FRACTURE EACH: CPT | Performed by: ORTHOPAEDIC SURGERY

## 2019-09-12 NOTE — ASSESSMENT & PLAN NOTE
Findings consistent with left nondisplaced intra-articular fracture of the middle phalanx of the ring finger  Findings and treatment options were discussed with the patient  X-rays were reviewed with them  Recommend use of Stax splint  He may come out of it for hygiene  Continue ice and NSAIDs as needed  Follow-up in 3 weeks with repeat x-rays  All questions were answered to patient's satisfaction

## 2019-09-12 NOTE — PROGRESS NOTES
Assessment:     1  Closed nondisplaced fracture of middle phalanx of left ring finger, initial encounter        Plan:  The patient was seen and examined by Dr Jacinta Neumann and myself  Problem List Items Addressed This Visit        Musculoskeletal and Integument    Closed nondisplaced fracture of middle phalanx of left ring finger - Primary     Findings consistent with left nondisplaced intra-articular fracture of the middle phalanx of the ring finger  Findings and treatment options were discussed with the patient  X-rays were reviewed with them  Recommend use of Stax splint  He may come out of it for hygiene  Continue ice and NSAIDs as needed  Follow-up in 3 weeks with repeat x-rays  All questions were answered to patient's satisfaction  Relevant Orders    Fracture / Dislocation Treatment (Completed)         Subjective:     Patient ID: Olive Salguero is a 44 y o  male  Chief Complaint: This is a 47-year-old white female who suffered injury to his left ring finger on September 5, 2019  Patient states he got that finger caught in his dog's collar and twisted  He felt immediate pain  He was seen at urgent care and x-rays revealed a middle phalanx fracture  He was placed in an aluminum foam splint  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  He does have increased pain at the end of the day and has been taking Motrin  He denies any prior injury to that finger  Patient intake form was reviewed today       Allergy:  No Known Allergies  Medications:  all current active meds have been reviewed  Past Medical History:  Past Medical History:   Diagnosis Date    Anxiety     Diabetes mellitus ([address] )     Hyperlipidemia     Hypertension     Kidney stone     Psychiatric disorder     Anxiety     Past Surgical History:  Past Surgical History:   Procedure Laterality Date    EXTRACORPOREAL SHOCK WAVE LITHOTRIPSY      KNEE ARTHROSCOPY Left     torn meniscus    [place] CYSTO/URETERO W/LITHOTRIPSY &INDWELL STENT INSRT Left 5/23/2017    Procedure: CYSTOSCOPY; URETEROSCOPY; HOLMIUM LASER; RETROGRADE PYELOGRAM; STENT PLACEMENT ;  Surgeon: Rose Lindsay MD;  [facility];  Service: Urology    [place] FRAGMENT KIDNEY STONE/ ESWL Left 5/18/2017    Procedure: ESWL ;  Surgeon: Salma Aaron MD;  [facility];  Service: Urology     Family History:  Family History   Problem Relation Age of Onset    Cancer Maternal Grandmother     Breast cancer Maternal Grandmother     Diabetes Mother     Diabetes Father     Hypertension Father     Hyperlipidemia Father      Social History:  Social History     Substance and Sexual Activity   Alcohol Use Yes    Alcohol/week: 6 0 standard drinks    Types: 6 Glasses of wine per week    Comment: per week     Social History     Substance and Sexual Activity   Drug Use No     Social History     Tobacco Use   Smoking Status Never Smoker   Smokeless Tobacco Never Used     Review of Systems   Constitutional: Negative  HENT: Negative  Eyes: Negative  Respiratory: Negative  Cardiovascular: Negative  Gastrointestinal: Negative  Endocrine: Negative  Genitourinary: Negative  Musculoskeletal: Positive for arthralgias and joint swelling  Skin: Negative  Allergic/Immunologic: Negative  Neurological: Negative  Hematological: Negative  Psychiatric/Behavioral: Negative  Objective:  BP Readings from Last 1 Encounters:   09/12/19 142/84      Wt Readings from Last 1 Encounters:   09/12/19 110 kg (242 lb)      BMI:   Estimated body mass index is 33 75 kg/m² as calculated from the following:    Height as of this encounter: 5' 11" (1 803 m)  Weight as of this encounter: 110 kg (242 lb)  BSA:   Estimated body surface area is 2 29 meters squared as calculated from the following:    Height as of this encounter: 5' 11" (1 803 m)  Weight as of this encounter: 110 kg (242 lb)     Physical Exam   Constitutional: He is oriented to person, place, and time  He appears well-developed  HENT:   Head: Normocephalic and atraumatic  Eyes: Conjunctivae and EOM are normal    Neck: Neck supple  Neurological: He is alert and oriented to person, place, and time  Skin: Skin is warm  Psychiatric: He has a normal mood and affect  Nursing note and vitals reviewed  Left Hand Exam     Tenderness   Left hand tenderness location: DIP joint ring finger  Range of Motion   Hand   MP Ring: normal   PIP Ring: abnormal   DIP Ring: abnormal     Other   Erythema: absent  Scars: absent  Sensation: normal  Pulse: present    Comments:  Swelling of DIP joint ring finger  No obvious deformity  Flexor and extensor tendons intact  Capillary refill brisk            I have personally reviewed pertinent films in PACS and my interpretation is X-rays of the left hand reveal a nondisplaced fracture of the middle phalanx of the ring finger extending into the DIP joint  Fracture / Dislocation Treatment  Date/Time: 9/12/2019 12:09 PM  Performed by: Ryne Ann PA-C  Authorized by: Tr Amaral MD     Patient Location:  Clinic  Verbal consent obtained?: Yes    Risks and benefits: Risks, benefits and alternatives were discussed    Consent given by:  Patient  Injury location:  Finger  Location details:  Left ring finger  Injury type:  Fracture  Fracture type: middle phalanx    Any IP joint involved?: Yes    Neurovascular status: Neurovascularly intact    Distal perfusion: normal    Neurological function: normal    Range of motion: reduced    Manipulation performed?: No    Immobilization:  Splint  Splint type:  Finger splint, static (Stax splint)  Supplies used: Stax splint    Neurovascular status: Neurovascularly intact    Distal perfusion: normal    Neurological function: normal    Range of motion: unchanged    Patient tolerance:  Patient tolerated the procedure well with no immediate complications

## 2019-09-25 DIAGNOSIS — E11.42 DIABETIC PERIPHERAL NEUROPATHY (HCC): ICD-10-CM

## 2019-09-25 RX ORDER — GABAPENTIN 300 MG/1
CAPSULE ORAL
Qty: 90 CAPSULE | Refills: 3 | Status: SHIPPED | OUTPATIENT
Start: 2019-09-25 | End: 2021-01-29 | Stop reason: SDUPTHER

## 2019-10-02 DIAGNOSIS — F41.9 ANXIETY: ICD-10-CM

## 2019-10-03 ENCOUNTER — OFFICE VISIT (OUTPATIENT)
Dept: OBGYN CLINIC | Facility: CLINIC | Age: 49
End: 2019-10-03

## 2019-10-03 ENCOUNTER — APPOINTMENT (OUTPATIENT)
Dept: RADIOLOGY | Facility: CLINIC | Age: 49
End: 2019-10-03
Payer: COMMERCIAL

## 2019-10-03 VITALS
WEIGHT: 245 LBS | HEIGHT: 71 IN | SYSTOLIC BLOOD PRESSURE: 160 MMHG | BODY MASS INDEX: 34.3 KG/M2 | DIASTOLIC BLOOD PRESSURE: 104 MMHG | HEART RATE: 100 BPM

## 2019-10-03 DIAGNOSIS — S62.655D CLOSED NONDISPLACED FRACTURE OF MIDDLE PHALANX OF LEFT RING FINGER WITH ROUTINE HEALING, SUBSEQUENT ENCOUNTER: ICD-10-CM

## 2019-10-03 DIAGNOSIS — S62.651D CLOSED NONDISPLACED FRACTURE OF MIDDLE PHALANX OF LEFT INDEX FINGER WITH ROUTINE HEALING, SUBSEQUENT ENCOUNTER: Primary | ICD-10-CM

## 2019-10-03 DIAGNOSIS — S62.651D CLOSED NONDISPLACED FRACTURE OF MIDDLE PHALANX OF LEFT INDEX FINGER WITH ROUTINE HEALING, SUBSEQUENT ENCOUNTER: ICD-10-CM

## 2019-10-03 PROCEDURE — 73140 X-RAY EXAM OF FINGER(S): CPT

## 2019-10-03 PROCEDURE — 99024 POSTOP FOLLOW-UP VISIT: CPT | Performed by: ORTHOPAEDIC SURGERY

## 2019-10-03 RX ORDER — ALPRAZOLAM 0.25 MG/1
0.25 TABLET ORAL 3 TIMES DAILY PRN
Qty: 30 TABLET | Refills: 0 | Status: SHIPPED | OUTPATIENT
Start: 2019-10-03 | End: 2020-08-26 | Stop reason: SDUPTHER

## 2019-10-03 NOTE — ASSESSMENT & PLAN NOTE
Findings consistent with left ring finger middle phalanx nondisplaced fracture, healing  Discussed findings and treatment options with the patient  I reviewed patient's left ring finger x-ray  I recommend patient to continue protecting his finger either with the stack splint or ronnie taping  Encourage gentle finger range of motion as tolerated  I will see patient back in 6 weeks for re-evaluation  All patient's questions were answered to his satisfaction  This note is created using dictation transcription  It may contain typographical errors, grammatical errors, improperly dictated words, background noise and other errors

## 2019-10-03 NOTE — PROGRESS NOTES
Assessment:     1  Closed nondisplaced fracture of middle phalanx of left index finger with routine healing, subsequent encounter    2  Closed nondisplaced fracture of middle phalanx of left ring finger with routine healing, subsequent encounter        Plan:     Problem List Items Addressed This Visit        Musculoskeletal and Integument    Closed nondisplaced fracture of middle phalanx of left ring finger     Findings consistent with left ring finger middle phalanx nondisplaced fracture, healing  Discussed findings and treatment options with the patient  I reviewed patient's left ring finger x-ray  I recommend patient to continue protecting his finger either with the stack splint or ronnie taping  Encourage gentle finger range of motion as tolerated  I will see patient back in 6 weeks for re-evaluation  All patient's questions were answered to his satisfaction  This note is created using dictation transcription  It may contain typographical errors, grammatical errors, improperly dictated words, background noise and other errors  Other Visit Diagnoses     Closed nondisplaced fracture of middle phalanx of left index finger with routine healing, subsequent encounter    -  Primary    Relevant Orders    XR finger left fourth digit-ring         Subjective:     Patient ID: Gretchen Summers is a 44 y o  male  Chief Complaint:  23-year-old gentleman follow-up left ring finger middle phalanx nondisplaced fracture  Patient has been using the stack splint  He is doing well  He still has swelling around the ring finger  He is able to actively moving his finger with mild discomfort      Allergy:  No Known Allergies  Medications:  all current active meds have been reviewed  Past Medical History:  Past Medical History:   Diagnosis Date    Anxiety     Diabetes mellitus ([address] )     Hyperlipidemia     Hypertension     Kidney stone     Psychiatric disorder     Anxiety     Past Surgical History:  Past Surgical History:   Procedure Laterality Date    EXTRACORPOREAL SHOCK WAVE LITHOTRIPSY      KNEE ARTHROSCOPY Left     torn meniscus    CT CYSTO/URETERO W/LITHOTRIPSY &INDWELL STENT INSRT Left 5/23/2017    Procedure: CYSTOSCOPY; URETEROSCOPY; HOLMIUM LASER; RETROGRADE PYELOGRAM; STENT PLACEMENT ;  Surgeon: Indu James MD;  [facility];  Service: Urology    CT FRAGMENT KIDNEY STONE/ ESWL Left 5/18/2017    Procedure: ESWL ;  Surgeon: Adam Amezquita MD;  [facility];  Service: Urology     Family History:  Family History   Problem Relation Age of Onset    Cancer Maternal Grandmother     Breast cancer Maternal Grandmother     Diabetes Mother     Diabetes Father     Hypertension Father     Hyperlipidemia Father      Social History:  Social History     Substance and Sexual Activity   Alcohol Use Yes    Alcohol/week: 6 0 standard drinks    Types: 6 Glasses of wine per week    Comment: per week     Social History     Substance and Sexual Activity   Drug Use No     Social History     Tobacco Use   Smoking Status Never Smoker   Smokeless Tobacco Never Used     Review of Systems   Constitutional: Negative  HENT: Negative  Eyes: Negative  Respiratory: Negative  Cardiovascular: Negative  Gastrointestinal: Negative  Endocrine: Negative  Genitourinary: Negative  Musculoskeletal: Positive for arthralgias (Left ring finger) and joint swelling (Left ring finger)  Skin: Negative  Neurological: Negative  Hematological: Negative  Psychiatric/Behavioral: Negative  Objective:  BP Readings from Last 1 Encounters:   10/03/19 (!) 160/104      Wt Readings from Last 1 Encounters:   10/03/19 111 kg (245 lb)      BMI:   Estimated body mass index is 34 17 kg/m² as calculated from the following:    Height as of this encounter: 5' 11" (1 803 m)  Weight as of this encounter: 111 kg (245 lb)    BSA:   Estimated body surface area is 2 3 meters squared as calculated from the following:    Height as of this encounter: 5' 11" (1 803 m)  Weight as of this encounter: 111 kg (245 lb)  Physical Exam   Constitutional: He is oriented to person, place, and time  He appears well-developed  HENT:   Head: Normocephalic and atraumatic  Eyes: Conjunctivae and EOM are normal    Neck: Neck supple  Pulmonary/Chest: Effort normal    Neurological: He is alert and oriented to person, place, and time  Skin: Skin is warm  Psychiatric: He has a normal mood and affect  Nursing note and vitals reviewed  Left Hand Exam     Tenderness   Left hand tenderness location: Left ring finger middle phalanx  Range of Motion   Hand   DIP Ring: abnormal     Other   Erythema: absent  Sensation: normal  Pulse: present            I have personally reviewed pertinent films in PACS and my interpretation is Left ring finger show middle phalanx fracture in good alignment with healing

## 2019-11-04 DIAGNOSIS — F32.9 REACTIVE DEPRESSION: ICD-10-CM

## 2019-11-04 RX ORDER — SERTRALINE HYDROCHLORIDE 100 MG/1
TABLET, FILM COATED ORAL
Qty: 60 TABLET | Refills: 5 | Status: SHIPPED | OUTPATIENT
Start: 2019-11-04 | End: 2021-01-29 | Stop reason: SDUPTHER

## 2019-11-07 ENCOUNTER — OFFICE VISIT (OUTPATIENT)
Dept: OBGYN CLINIC | Facility: CLINIC | Age: 49
End: 2019-11-07

## 2019-11-07 VITALS
HEIGHT: 71 IN | HEART RATE: 100 BPM | SYSTOLIC BLOOD PRESSURE: 160 MMHG | BODY MASS INDEX: 34.3 KG/M2 | WEIGHT: 245 LBS | DIASTOLIC BLOOD PRESSURE: 100 MMHG

## 2019-11-07 DIAGNOSIS — S62.655D CLOSED NONDISPLACED FRACTURE OF MIDDLE PHALANX OF LEFT RING FINGER WITH ROUTINE HEALING, SUBSEQUENT ENCOUNTER: ICD-10-CM

## 2019-11-07 DIAGNOSIS — M20.032 SWAN-NECK DEFORMITY OF FINGER, LEFT: Primary | ICD-10-CM

## 2019-11-07 PROCEDURE — 99024 POSTOP FOLLOW-UP VISIT: CPT | Performed by: ORTHOPAEDIC SURGERY

## 2019-11-07 NOTE — PROGRESS NOTES
Assessment:     1  [place]-neck deformity of finger, left    2  Closed nondisplaced fracture of middle phalanx of left ring finger with routine healing, subsequent encounter        Plan:     Problem List Items Addressed This Visit        Musculoskeletal and Integument    Closed nondisplaced fracture of middle phalanx of left ring finger      Other Visit Diagnoses     [place]-neck deformity of finger, left    -  Primary    Relevant Orders    Ambulatory referral to Orthopedic Surgery          It was discussed with Cristian Cruz today that it appears he has developed a swan neck deformity to his left ring finger  A referral was placed for a consultation with Dr Emeka Capone for further treatment options  With regards to his left ring finger middle phalanx fracture, he may d/c the use of the splint vs buddy straps  Activities as tolerated  Subjective:     Patient ID: Dolores Gamboa is a 44 y o  male  Chief Complaint:  44 y o  male presents to the office today for a follow up regarding a left ring finger middle phalanx fracture  Overall Cristian Cruz is doing well  He denies any pain today  He does note that he is unable to extend his DIP joint  He has d/c the use of the stax splint and buddy straps  He is taking Advil/Ibuprofen as needed for pain control       Allergy:  No Known Allergies  Medications:  all current active meds have been reviewed  Past Medical History:  Past Medical History:   Diagnosis Date    Anxiety     Diabetes mellitus ([address] )     Hyperlipidemia     Hypertension     Kidney stone     Psychiatric disorder     Anxiety     Past Surgical History:  Past Surgical History:   Procedure Laterality Date    EXTRACORPOREAL SHOCK WAVE LITHOTRIPSY      KNEE ARTHROSCOPY Left     torn meniscus    [place] CYSTO/URETERO W/LITHOTRIPSY &INDWELL STENT INSRT Left 5/23/2017    Procedure: CYSTOSCOPY; URETEROSCOPY; HOLMIUM LASER; RETROGRADE PYELOGRAM; STENT PLACEMENT ;  Surgeon: Jonh Ng MD;  [facility];  Service: Urology    [place] FRAGMENT KIDNEY STONE/ ESWL Left 5/18/2017    Procedure: ESWL ;  Surgeon: Tuan Sosa MD;  [facility];  Service: Urology     Family History:  Family History   Problem Relation Age of Onset    Cancer Maternal Grandmother     Breast cancer Maternal Grandmother     Diabetes Mother     Diabetes Father     Hypertension Father     Hyperlipidemia Father      Social History:  Social History     Substance and Sexual Activity   Alcohol Use Yes    Alcohol/week: 6 0 standard drinks    Types: 6 Glasses of wine per week    Comment: per week     Social History     Substance and Sexual Activity   Drug Use No     Social History     Tobacco Use   Smoking Status Never Smoker   Smokeless Tobacco Never Used     Review of Systems   Constitutional: Negative for chills, fever and unexpected weight change  HENT: Negative for hearing loss, nosebleeds and sore throat  Eyes: Negative for pain, redness and visual disturbance  Respiratory: Negative for cough, shortness of breath and wheezing  Cardiovascular: Negative for chest pain, palpitations and leg swelling  Gastrointestinal: Negative for abdominal pain, nausea and vomiting  Endocrine: Negative for polydipsia and polyuria  Genitourinary: Negative for difficulty urinating and hematuria  Musculoskeletal: Negative for arthralgias, joint swelling and myalgias  Skin: Negative for rash and wound  Neurological: Negative for dizziness, numbness and headaches  Psychiatric/Behavioral: Negative for decreased concentration, dysphoric mood and suicidal ideas  The patient is not nervous/anxious  Objective:  BP Readings from Last 1 Encounters:   11/07/19 160/100      Wt Readings from Last 1 Encounters:   11/07/19 111 kg (245 lb)      BMI:   Estimated body mass index is 34 17 kg/m² as calculated from the following:    Height as of this encounter: 5' 11" (1 803 m)  Weight as of this encounter: 111 kg (245 lb)    BSA:   Estimated body surface area is 2 3 meters squared as calculated from the following:    Height as of this encounter: 5' 11" (1 803 m)  Weight as of this encounter: 111 kg (245 lb)  Physical Exam   Constitutional: He is oriented to person, place, and time  He appears well-developed and well-nourished  HENT:   Head: Normocephalic and atraumatic  Eyes: Conjunctivae and EOM are normal    Neck: Neck supple  Pulmonary/Chest: Effort normal    Neurological: He is alert and oriented to person, place, and time  Skin: Skin is warm and dry  Psychiatric: He has a normal mood and affect  His behavior is normal    Nursing note and vitals reviewed  Left Hand Exam     Tenderness   The patient is experiencing no tenderness  Range of Motion   Wrist   Extension: normal   Flexion: normal   Pronation: normal   Supination: normal     Muscle Strength   The patient has normal left wrist strength      Other   Erythema: absent  Scars: absent  Sensation: normal  Pulse: present    Comments:  Hyperextension of PIP joint   Flexion contracture of DIP joint   Unable to actively extend DIP joint  [place]-neck deformity  Sensation intact to light touch   Brisk capillary refill   Full composite fist possible             No new imaging to review     Scribe Attestation    I,:   Yossi Panda am acting as a scribe while in the presence of the attending physician :        I,:   Ting Ralph MD personally performed the services described in this documentation    as scribed in my presence :

## 2019-12-09 ENCOUNTER — APPOINTMENT (OUTPATIENT)
Dept: RADIOLOGY | Facility: CLINIC | Age: 49
End: 2019-12-09
Payer: COMMERCIAL

## 2019-12-09 ENCOUNTER — OFFICE VISIT (OUTPATIENT)
Dept: OBGYN CLINIC | Facility: CLINIC | Age: 49
End: 2019-12-09
Payer: COMMERCIAL

## 2019-12-09 VITALS
SYSTOLIC BLOOD PRESSURE: 170 MMHG | BODY MASS INDEX: 34.58 KG/M2 | DIASTOLIC BLOOD PRESSURE: 110 MMHG | WEIGHT: 247 LBS | HEIGHT: 71 IN

## 2019-12-09 DIAGNOSIS — M20.032 SWAN-NECK DEFORMITY OF FINGER, LEFT: ICD-10-CM

## 2019-12-09 DIAGNOSIS — M20.012 MALLET DEFORMITY OF LEFT RING FINGER: ICD-10-CM

## 2019-12-09 DIAGNOSIS — T14.8XXA FRACTURE: Primary | ICD-10-CM

## 2019-12-09 DIAGNOSIS — T14.8XXA FRACTURE: ICD-10-CM

## 2019-12-09 PROCEDURE — 99243 OFF/OP CNSLTJ NEW/EST LOW 30: CPT | Performed by: ORTHOPAEDIC SURGERY

## 2019-12-09 PROCEDURE — 73140 X-RAY EXAM OF FINGER(S): CPT

## 2019-12-09 NOTE — PROGRESS NOTES
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  Follow Up:  5  month(s)    To Do Next Visit:  Re-evaluate    General Discussions:     Mallet Finger: The anatomy and physiology of a mallet finger was discussed with the patient today  Typically, the extensor tendon is torn off of the dorsal aspect of the distal phalanx  This results in flexion of the distal interphalangeal joint, with incomplete extension  Typically, and less the joint is subluxed, this is treated through conservative measures  An extension block splint is worn over the distal interphalangeal joint for 6 weeks continuously, followed by 6 weeks of nocturnal use  After healing, there is typically a small flexion deformity at the distal interphalangeal joint  Surgery does not typically change these results  _____________________________________________________  CHIEF COMPLAINT:  Chief Complaint   Patient presents with    Left Ring Finger - Pain         SUBJECTIVE:  James Grier is a 44 y o  male who is here for a consultation for his left ring finger middle phalanx fracture referred by Dr José Luis Pham  He notes that he had an injury to the Left ring finger 9/5/19, got that finger caught in his dog's collar and twisted  He felt immediate pain  He was seen at urgent care and x-rays revealed a middle phalanx fracture  He was placed in an aluminum foam splint  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  He does note that he is unable to extend his DIP joint  He has treated with OTC advil for his pain       Previous Treatments: stax splint and ronnie straps  with only partial relief  Associated symptoms: Pain  Mild  Intermittant Aching    PAST MEDICAL HISTORY:  Past Medical History:   Diagnosis Date    Anxiety     Diabetes mellitus ([address] )     Hyperlipidemia     Hypertension     Kidney stone     Psychiatric disorder     Anxiety       PAST SURGICAL HISTORY:  Past Surgical History:   Procedure Laterality Date    EXTRACORPOREAL SHOCK WAVE LITHOTRIPSY      KNEE ARTHROSCOPY Left     torn meniscus    MD CYSTO/URETERO W/LITHOTRIPSY &INDWELL STENT INSRT Left 5/23/2017    Procedure: CYSTOSCOPY; URETEROSCOPY; HOLMIUM LASER; RETROGRADE PYELOGRAM; STENT PLACEMENT ;  Surgeon: Donte Campa MD;  [facility];  Service: Urology    MD FRAGMENT KIDNEY STONE/ ESWL Left 5/18/2017    Procedure: ESWL ;  Surgeon: Loretta Hollingsworth MD;  [facility];  Service: Urology       FAMILY HISTORY:  Family History   Problem Relation Age of Onset    Cancer Maternal Grandmother     Breast cancer Maternal Grandmother     Diabetes Mother     Diabetes Father     Hypertension Father     Hyperlipidemia Father        SOCIAL HISTORY:  Social History     Tobacco Use    Smoking status: Never Smoker    Smokeless tobacco: Never Used   Substance Use Topics    Alcohol use:  Yes     Alcohol/week: 6 0 standard drinks     Types: 6 Glasses of wine per week     Comment: per week    Drug use: No       MEDICATIONS:    Current Outpatient Medications:     ALPRAZolam (XANAX) 0 25 mg tablet, TAKE 1 TABLET (0 25 MG TOTAL) BY MOUTH 3 (THREE) TIMES A DAY AS NEEDED FOR ANXIETY, Disp: 30 tablet, Rfl: 0    amLODIPine (NORVASC) 5 mg tablet, Take 5 mg by mouth daily, Disp: , Rfl:     aspirin 81 MG tablet, Take 81 mg by mouth daily, Disp: , Rfl:     Blood Glucose Monitoring Suppl (ACCU-CHEK JESSY CONNECT) w/Device KIT, by Does not apply route, Disp: , Rfl:     Cholecalciferol (VITAMIN D3) 2000 UNITS TABS, Take 1 tablet by mouth daily, Disp: , Rfl:     gabapentin (NEURONTIN) 300 mg capsule, TAKE 1 CAPSULE BY MOUTH THREE TIMES A DAY, Disp: 90 capsule, Rfl: 3   glimepiride (AMARYL) 2 mg tablet, Take 1 tablet (2 mg total) by mouth 2 (two) times a day with meals, Disp: 60 tablet, Rfl: 5    Omega-3 Fatty Acids (FISH OIL) 1,000 mg, Take 1 capsule by mouth daily, Disp: , Rfl:     sertraline (ZOLOFT) 100 mg tablet, TAKE 2 TABLETS BY MOUTH EVERY DAY, Disp: 60 tablet, Rfl: 5    sildenafil (REVATIO) 20 mg tablet, Take 3 tablets (60 mg total) by mouth daily as needed (ED), Disp: 90 tablet, Rfl: 3    simvastatin (ZOCOR) 40 mg tablet, Take 40 mg by mouth daily at bedtime, Disp: , Rfl:     sitaGLIPtin-metFORMIN (JANUMET)  MG per tablet, Take 2 tablets by mouth daily, Disp: 180 tablet, Rfl: 1    valsartan-hydrochlorothiazide (DIOVAN-HCT) 320-12 5 MG per tablet, Take 1 tablet by mouth daily, Disp: 30 tablet, Rfl: 5    oxybutynin (DITROPAN-XL) 10 MG 24 hr tablet, Take 1 tablet (10 mg total) by mouth daily (Patient not taking: Reported on 11/7/2019), Disp: 90 tablet, Rfl: 3    SUPREP BOWEL PREP KIT 17 5-3 13-1 6 GM/177ML SOLN, Take 180 mL by mouth once for 1 dose (Patient not taking: Reported on 12/9/2019), Disp: 180 mL, Rfl: 0    ALLERGIES:  No Known Allergies    REVIEW OF SYSTEMS:  Pertinent items are noted in HPI  A comprehensive review of systems was negative      LABS:  HgA1c:   Lab Results   Component Value Date    HGBA1C 10 1 (A) 05/20/2019     BMP:   Lab Results   Component Value Date    GLUCOSE 349 (H) 12/19/2014    CALCIUM 9 8 11/15/2018     09/02/2016    K 3 7 05/20/2019    CO2 22 05/20/2019    CL 97 05/20/2019    BUN 28 (H) 05/20/2019    CREATININE 1 34 (H) 05/20/2019         _____________________________________________________  PHYSICAL EXAMINATION:  Vital signs: BP (!) 170/110 Comment: Pt did not take his BP medication today  Ht 5' 11" (1 803 m)   Wt 112 kg (247 lb)   BMI 34 45 kg/m²   General: well developed and well nourished, alert, oriented times 3 and appears comfortable  Psychiatric: Normal  HEENT: Trachea Midline, No torticollis  Cardiovascular: No discernable arrhythmia  Pulmonary: No wheezing or stridor  Skin: No masses, erythema, lacerations, fluctation, ulcerations  Neurovascular: Sensation Intact to the Median, Ulnar, Radial Nerve, Motor Intact to the Median, Ulnar, Radial Nerve and Pulses Intact    MUSCULOSKELETAL EXAMINATION:  LEFT SIDE:  ring finger- 2mm terminal flexion, Karlo's test is normal, No boutinere deformity, + mallet finger  There is hyperextension at the PIP bilaterally  Residual DIP 10-15 degree droop of the DIP joint  NVI and good capillary refill    _____________________________________________________  STUDIES REVIEWED:  Images were reviewd in PACS: healed fracture of the distal end of the middle phalanx of the left ring finger         PROCEDURES PERFORMED:  Procedures  No Procedures performed today     Scribe Attestation    I,:   Florencia Holden am acting as a scribe while in the presence of the attending physician :        I,:   Jose Murray MD personally performed the services described in this documentation    as scribed in my presence :          Lilia Last,:   Florencia Holden am acting as a scribe while in the presence of the attending physician :        I,:   Jose Murray MD personally performed the services described in this documentation    as scribed in my presence :

## 2019-12-11 ENCOUNTER — APPOINTMENT (EMERGENCY)
Dept: RADIOLOGY | Facility: HOSPITAL | Age: 49
End: 2019-12-11
Payer: COMMERCIAL

## 2019-12-11 ENCOUNTER — OFFICE VISIT (OUTPATIENT)
Dept: URGENT CARE | Facility: CLINIC | Age: 49
End: 2019-12-11
Payer: COMMERCIAL

## 2019-12-11 ENCOUNTER — HOSPITAL ENCOUNTER (EMERGENCY)
Facility: HOSPITAL | Age: 49
Discharge: HOME | End: 2019-12-11
Attending: EMERGENCY MEDICINE | Admitting: EMERGENCY MEDICINE
Payer: COMMERCIAL

## 2019-12-11 VITALS
WEIGHT: 245 LBS | RESPIRATION RATE: 20 BRPM | TEMPERATURE: 97.5 F | HEART RATE: 99 BPM | OXYGEN SATURATION: 98 % | HEIGHT: 71 IN | BODY MASS INDEX: 34.3 KG/M2 | SYSTOLIC BLOOD PRESSURE: 152 MMHG | DIASTOLIC BLOOD PRESSURE: 112 MMHG

## 2019-12-11 VITALS
TEMPERATURE: 97.2 F | OXYGEN SATURATION: 97 % | RESPIRATION RATE: 18 BRPM | HEART RATE: 91 BPM | HEIGHT: 71 IN | DIASTOLIC BLOOD PRESSURE: 107 MMHG | SYSTOLIC BLOOD PRESSURE: 167 MMHG | WEIGHT: 245.15 LBS | BODY MASS INDEX: 34.32 KG/M2

## 2019-12-11 DIAGNOSIS — R51.9 HEADACHE: Primary | ICD-10-CM

## 2019-12-11 DIAGNOSIS — R51.9 INTRACTABLE HEADACHE, UNSPECIFIED CHRONICITY PATTERN, UNSPECIFIED HEADACHE TYPE: Primary | ICD-10-CM

## 2019-12-11 DIAGNOSIS — I10 UNCONTROLLED HYPERTENSION: ICD-10-CM

## 2019-12-11 PROCEDURE — 99284 EMERGENCY DEPT VISIT MOD MDM: CPT | Performed by: EMERGENCY MEDICINE

## 2019-12-11 PROCEDURE — 96375 TX/PRO/DX INJ NEW DRUG ADDON: CPT

## 2019-12-11 PROCEDURE — 99213 OFFICE O/P EST LOW 20 MIN: CPT | Performed by: NURSE PRACTITIONER

## 2019-12-11 PROCEDURE — 99283 EMERGENCY DEPT VISIT LOW MDM: CPT

## 2019-12-11 PROCEDURE — 96374 THER/PROPH/DIAG INJ IV PUSH: CPT

## 2019-12-11 PROCEDURE — 96361 HYDRATE IV INFUSION ADD-ON: CPT

## 2019-12-11 PROCEDURE — 70220 X-RAY EXAM OF SINUSES: CPT

## 2019-12-11 RX ORDER — METOCLOPRAMIDE HYDROCHLORIDE 5 MG/ML
10 INJECTION INTRAMUSCULAR; INTRAVENOUS ONCE
Status: COMPLETED | OUTPATIENT
Start: 2019-12-11 | End: 2019-12-11

## 2019-12-11 RX ORDER — KETOROLAC TROMETHAMINE 30 MG/ML
30 INJECTION, SOLUTION INTRAMUSCULAR; INTRAVENOUS ONCE
Status: COMPLETED | OUTPATIENT
Start: 2019-12-11 | End: 2019-12-11

## 2019-12-11 RX ADMIN — METOCLOPRAMIDE 10 MG: 5 INJECTION, SOLUTION INTRAMUSCULAR; INTRAVENOUS at 09:41

## 2019-12-11 RX ADMIN — SODIUM CHLORIDE 1000 ML: 0.9 INJECTION, SOLUTION INTRAVENOUS at 09:39

## 2019-12-11 RX ADMIN — KETOROLAC TROMETHAMINE 30 MG: 30 INJECTION, SOLUTION INTRAMUSCULAR; INTRAVENOUS at 09:40

## 2019-12-11 NOTE — DISCHARGE INSTRUCTIONS
Call your primary care provider to discuss your elevated blood pressure and the need for any changes to your blood pressure regimen

## 2019-12-11 NOTE — ED PROVIDER NOTES
History  Chief Complaint   Patient presents with    Headache     pt states that he has been having a h/a for a week and has been feeling nauseous and feels like he is having a sinus infection  Went to urgent care today and his blood pressure was elevated and was told to come into ER for further eval       Sent in from urgent care for headache and elevated blood pressure  Has had HA for about a week  Gradual onset and worsening  Now 8/10 frontal region w/ radiation toward the back of the head / occipital region  Denies neck pain or stiffness  Notes some frontal and maxillary sinus pressure  Denies f/c/s, no cough, no sob/dent, no cp/pressure, no abd pain  Hx of htn, taking meds as prescribed  Had URI type symptoms and was taking OTC [place] - doesn't take anything with decongestants b/c of his bp  History provided by:  Patient   used: No    Headache   Pain location:  Frontal  Quality:  Dull  Radiates to: occipital region  Severity currently:  8/10  Onset quality:  Gradual  Duration:  5 days  Timing:  Constant  Progression:  Unchanged  Chronicity:  New  Similar to prior headaches: yes    Context comment:  Had URI symptoms, ?sinus infection  Relieved by:  Nothing  Worsened by:  Nothing  Ineffective treatments:  Acetaminophen  Associated symptoms: facial pain (mild), fatigue and sinus pressure    Associated symptoms: no abdominal pain, no blurred vision, no congestion, no cough, no ear pain, no eye pain, no fever, no focal weakness, no loss of balance, no myalgias, no nausea, no neck pain, no neck stiffness, no paresthesias, no photophobia, no sore throat, no swollen glands, no vomiting and no weakness        Prior to Admission Medications   Prescriptions Last Dose Informant Patient Reported? Taking?    ALPRAZolam (XANAX) 0 25 mg tablet Not Taking at Unknown time Self No No   Sig: TAKE 1 TABLET (0 25 MG TOTAL) BY MOUTH 3 (THREE) TIMES A DAY AS NEEDED FOR ANXIETY   Patient not taking: Reported on 12/11/2019   Blood Glucose Monitoring Suppl (ACCU-CHEK JESSY CONNECT) w/Device KIT  Self Yes No   Sig: by Does not apply route   Cholecalciferol (VITAMIN D3) 2000 UNITS TABS  Self Yes No   Sig: Take 1 tablet by mouth daily   Omega-3 Fatty Acids (FISH OIL) 1,000 mg  Self Yes No   Sig: Take 1 capsule by mouth daily   SUPREP BOWEL PREP KIT 17 5-3 13-1 6 GM/177ML SOLN   No No   Sig: Take 180 mL by mouth once for 1 dose   Patient not taking: Reported on 12/9/2019   amLODIPine (NORVASC) 5 mg tablet  Self Yes No   Sig: Take 5 mg by mouth daily   aspirin 81 MG tablet  Self Yes No   Sig: Take 81 mg by mouth daily   gabapentin (NEURONTIN) 300 mg capsule  Self No No   Sig: TAKE 1 CAPSULE BY MOUTH THREE TIMES A DAY   glimepiride (AMARYL) 2 mg tablet  Self No No   Sig: Take 1 tablet (2 mg total) by mouth 2 (two) times a day with meals   oxybutynin (DITROPAN-XL) 10 MG 24 hr tablet Not Taking at Unknown time Self No No   Sig: Take 1 tablet (10 mg total) by mouth daily   Patient not taking: Reported on 11/7/2019   sertraline (ZOLOFT) 100 mg tablet  Self No No   Sig: TAKE 2 TABLETS BY MOUTH EVERY DAY   sildenafil (REVATIO) 20 mg tablet Not Taking at Unknown time Self No No   Sig: Take 3 tablets (60 mg total) by mouth daily as needed (ED)   Patient not taking: Reported on 12/11/2019   simvastatin (ZOCOR) 40 mg tablet  Self Yes No   Sig: Take 40 mg by mouth daily at bedtime   sitaGLIPtin-metFORMIN (JANUMET)  MG per tablet  Self No No   Sig: Take 2 tablets by mouth daily   valsartan-hydrochlorothiazide (DIOVAN-HCT) 320-12 5 MG per tablet  Self No No   Sig: Take 1 tablet by mouth daily      Facility-Administered Medications: None       Past Medical History:   Diagnosis Date    Anxiety     Diabetes mellitus ([address] )     Hyperlipidemia     Hypertension     Kidney stone     Psychiatric disorder     Anxiety       Past Surgical History:   Procedure Laterality Date    EXTRACORPOREAL SHOCK WAVE LITHOTRIPSY      KNEE ARTHROSCOPY Left     torn meniscus    CA CYSTO/URETERO W/LITHOTRIPSY &INDWELL STENT INSRT Left 5/23/2017    Procedure: CYSTOSCOPY; URETEROSCOPY; HOLMIUM LASER; RETROGRADE PYELOGRAM; STENT PLACEMENT ;  Surgeon: Irasema Javed MD;  [facility];  Service: Urology    CA FRAGMENT KIDNEY STONE/ ESWL Left 5/18/2017    Procedure: ESWL ;  Surgeon: Mario Baker MD;  [facility];  Service: Urology       Family History   Problem Relation Age of Onset    Cancer Maternal Grandmother     Breast cancer Maternal Grandmother     Diabetes Mother     Diabetes Father     Hypertension Father     Hyperlipidemia Father      I have reviewed and agree with the history as documented  Social History     Tobacco Use    Smoking status: Never Smoker    Smokeless tobacco: Never Used   Substance Use Topics    Alcohol use: Yes     Comment: SOCIAL    Drug use: No        Review of Systems   Constitutional: Positive for fatigue  Negative for fever  HENT: Positive for sinus pressure  Negative for congestion, ear pain and sore throat  Eyes: Negative for blurred vision, photophobia and pain  Respiratory: Negative for cough  Gastrointestinal: Negative for abdominal pain, nausea and vomiting  Musculoskeletal: Negative for myalgias, neck pain and neck stiffness  Neurological: Positive for headaches  Negative for focal weakness, weakness, paresthesias and loss of balance  All other systems reviewed and are negative  Physical Exam  Physical Exam   Constitutional: He is oriented to person, place, and time  He appears well-developed and well-nourished  HENT:   Nose: Nose normal    Mouth/Throat: Oropharynx is clear and moist    Eyes: Conjunctivae are normal    Neck: Neck supple  Cardiovascular: Normal rate and regular rhythm  Pulmonary/Chest: Effort normal and breath sounds normal    Abdominal: Soft  Musculoskeletal: He exhibits no deformity     Neurological: He is alert and oriented to person, place, and time  He has normal strength  No cranial nerve deficit or sensory deficit  GCS eye subscore is 4  GCS verbal subscore is 5  GCS motor subscore is 6  Skin: Skin is warm  No rash noted  Psychiatric: He has a normal mood and affect  Nursing note and vitals reviewed  Vital Signs  ED Triage Vitals [12/11/19 0911]   Temperature Pulse Respirations Blood Pressure SpO2   (!) 97 2 °F (36 2 °C) 92 18 (!) 177/113 98 %      Temp Source Heart Rate Source Patient Position - Orthostatic VS BP Location FiO2 (%)   Temporal -- Lying Right arm --      Pain Score       7           Vitals:    12/11/19 1015 12/11/19 1030 12/11/19 1045 12/11/19 1100   BP: 168/96 153/96 (!) 165/104 (!) 167/107   Pulse: 88 89 89 91   Patient Position - Orthostatic VS: Lying Lying Lying Lying         Visual Acuity  Visual Acuity      Most Recent Value   L Pupil Size (mm)  3   R Pupil Size (mm)  3          ED Medications  Medications   sodium chloride 0 9 % bolus 1,000 mL (0 mL Intravenous Stopped 12/11/19 1039)   ketorolac (TORADOL) injection 30 mg (30 mg Intravenous Given 12/11/19 0940)   metoclopramide (REGLAN) injection 10 mg (10 mg Intravenous Given 12/11/19 0941)       Diagnostic Studies  Results Reviewed     None                 XR sinuses routine 3+ views   ED Interpretation by Kari Cruz DO (12/11 1055)   See below      Final Result by Asia George MD (12/11 1049)      No evidence of sinusitis           Workstation performed: [id number]                    Procedures  Procedures         ED Course  ED Course as of Dec 11 1814   Wed Dec 11, 2019   1058 Christopher Sosa completed resolved                                  MDM  Number of Diagnoses or Management Options  Headache: new and requires workup  Uncontrolled hypertension: new and requires workup     Amount and/or Complexity of Data Reviewed  Tests in the radiology section of CPT®: ordered and reviewed  Independent visualization of images, tracings, or specimens: yes          Disposition  Final diagnoses:   Headache   Uncontrolled hypertension     Time reflects when diagnosis was documented in both MDM as applicable and the Disposition within this note     Time User Action Codes Description Comment    12/11/2019 10:59 AM Kelle Larios [R51] Headache     12/11/2019 10:59 AM Kelle Larios [I10] Uncontrolled hypertension       ED Disposition     ED Disposition Condition Date/Time Comment    Discharge Stable Wed Dec 11, 2019 10:59 AM Yunior Nicole discharge to home/self care              Follow-up Information     Follow up With Specialties Details Why Contact Aaliyah Cardona, DO Family Medicine Call today to discuss a follow up appointment for your blood pressure [address]  [phone]            Discharge Medication List as of 12/11/2019 11:01 AM      CONTINUE these medications which have NOT CHANGED    Details   ALPRAZolam (XANAX) 0 25 mg tablet TAKE 1 TABLET (0 25 MG TOTAL) BY MOUTH 3 (THREE) TIMES A DAY AS NEEDED FOR ANXIETY, Starting u 10/3/2019, Normal      amLODIPine (NORVASC) 5 mg tablet Take 5 mg by mouth daily, Historical Med      aspirin 81 MG tablet Take 81 mg by mouth daily, Historical Med      Blood Glucose Monitoring Suppl (ACCU-CHEK JESSY CONNECT) w/Device KIT by Does not apply route, Starting Wed 3/4/2015, Historical Med      Cholecalciferol (VITAMIN D3) 2000 UNITS TABS Take 1 tablet by mouth daily, Historical Med      gabapentin (NEURONTIN) 300 mg capsule TAKE 1 CAPSULE BY MOUTH THREE TIMES A DAY, Normal      glimepiride (AMARYL) 2 mg tablet Take 1 tablet (2 mg total) by mouth 2 (two) times a day with meals, Starting Mon 5/20/2019, Normal      Omega-3 Fatty Acids (FISH OIL) 1,000 mg Take 1 capsule by mouth daily, Starting Mon 9/8/2014, Historical Med      oxybutynin (DITROPAN-XL) 10 MG 24 hr tablet Take 1 tablet (10 mg total) by mouth daily, Starting Mon 3/4/2019, Normal      sertraline (ZOLOFT) 100 mg tablet TAKE 2 TABLETS BY MOUTH EVERY DAY, Normal      sildenafil (REVATIO) 20 mg tablet Take 3 tablets (60 mg total) by mouth daily as needed (ED), Starting Tue 1/8/2019, Normal      simvastatin (ZOCOR) 40 mg tablet Take 40 mg by mouth daily at bedtime, Historical Med      sitaGLIPtin-metFORMIN (JANUMET)  MG per tablet Take 2 tablets by mouth daily, Starting Mon 2/18/2019, Normal      SUPREP BOWEL PREP KIT 17 5-3 13-1 6 GM/177ML SOLN Take 180 mL by mouth once for 1 dose, Starting Fri 3/29/2019, Normal      valsartan-hydrochlorothiazide (DIOVAN-HCT) 320-12 5 MG per tablet Take 1 tablet by mouth daily, Starting Tue 2/19/2019, Normal           No discharge procedures on file      ED Provider  Electronically Signed by           Denae Levine DO  12/11/19 8102

## 2019-12-11 NOTE — PROGRESS NOTES
Assessment/Plan    Intractable headache, unspecified chronicity pattern, unspecified headache type [R51]  1  Intractable headache, unspecified chronicity pattern, unspecified headache type  Transfer to other facility         Subjective:     Patient ID: Jonh Amor is a 44 y o  male  Reason For Visit / Chief Complaint  Chief Complaint   Patient presents with    Headache     patient reports he has had sinus pain or pressure x 1 week, taking OTC medication with little relief  reports he has been treated for HTN x 10 years  last pm Bp 192/124, this am 180/114, reports he has a headache along with nausea  This is a 66-year-old male patient who presents to the urgent care today  Patient is complaining of a headache for the past week as well as high blood pressure readings  Patient describes the pain as being all over his head in the front of his head on the back of his head  Last night he was unable to sleep because his headache was so painful  Patient was concerned because his blood pressure reading at home was 192/114 and then 180/112 despite his normal blood pressure medication  Patient denies recent stressors  Patient denies recent illness  Patient denies chest pain or shortness of breath  Patient denies syncope or near syncope  Patient denies dizziness, trouble ambulating, trouble speaking or incoordination        Past Medical History:   Diagnosis Date    Anxiety     Diabetes mellitus ([address] )     Hyperlipidemia     Hypertension     Kidney stone     Psychiatric disorder     Anxiety       Past Surgical History:   Procedure Laterality Date    EXTRACORPOREAL SHOCK WAVE LITHOTRIPSY      KNEE ARTHROSCOPY Left     torn meniscus    LA CYSTO/URETERO W/LITHOTRIPSY &INDWELL STENT INSRT Left 5/23/2017    Procedure: CYSTOSCOPY; URETEROSCOPY; HOLMIUM LASER; RETROGRADE PYELOGRAM; STENT PLACEMENT ;  Surgeon: Dominguez Ritchie MD;  [facility];  Service: Urology    [address] [place] [address] STONE/ ESWL Left 5/18/2017    Procedure: ESWL ;  Surgeon: Miguelina Mcwilliams MD;  [facility];  Service: Urology       Family History   Problem Relation Age of Onset    Cancer Maternal Grandmother     Breast cancer Maternal Grandmother     Diabetes Mother     Diabetes Father     Hypertension Father     Hyperlipidemia Father        Review of Systems   Constitutional: Negative for chills and fever  Respiratory: Negative for shortness of breath  Cardiovascular: Negative for chest pain  Neurological: Positive for headaches  Negative for dizziness, tremors, seizures, syncope, facial asymmetry, speech difficulty, weakness, light-headedness and numbness  Objective:    Pulse 99   Temp 97 5 °F (36 4 °C)   Resp 20   Ht 5' 11" (1 803 m)   Wt 111 kg (245 lb)   SpO2 98%   BMI 34 17 kg/m²     Physical Exam   Constitutional: He is oriented to person, place, and time  He appears well-developed and well-nourished  HENT:   Head: Normocephalic and atraumatic  Eyes: Pupils are equal, round, and reactive to light  Neck: Normal range of motion  Cardiovascular: Normal rate, regular rhythm and normal heart sounds  Exam reveals no gallop and no friction rub  No murmur heard  Pulmonary/Chest: Effort normal and breath sounds normal  No stridor  No respiratory distress  He has no wheezes  He has no rales  He exhibits no tenderness  Musculoskeletal: Normal range of motion  Neurological: He is alert and oriented to person, place, and time  No cranial nerve deficit or sensory deficit  Coordination normal    Skin: Skin is warm and dry  Capillary refill takes less than 2 seconds  Psychiatric: He has a normal mood and affect  Nursing note and vitals reviewed

## 2020-01-30 ENCOUNTER — TELEPHONE (OUTPATIENT)
Dept: FAMILY MEDICINE CLINIC | Facility: CLINIC | Age: 50
End: 2020-01-30

## 2020-01-30 NOTE — TELEPHONE ENCOUNTER
lmom advised patient to return call  Patient is overdue for diabetic f/u  Please help patient schedule with in the next month  Thanks!

## 2020-04-09 DIAGNOSIS — N52.9 ERECTILE DYSFUNCTION, UNSPECIFIED ERECTILE DYSFUNCTION TYPE: ICD-10-CM

## 2020-04-15 RX ORDER — SILDENAFIL CITRATE 20 MG/1
TABLET ORAL
Qty: 90 TABLET | Refills: 0 | Status: SHIPPED | OUTPATIENT
Start: 2020-04-15

## 2020-04-27 ENCOUNTER — TELEPHONE (OUTPATIENT)
Dept: UROLOGY | Facility: AMBULATORY SURGERY CENTER | Age: 50
End: 2020-04-27

## 2020-05-07 ENCOUNTER — TELEPHONE (OUTPATIENT)
Dept: UROLOGY | Facility: AMBULATORY SURGERY CENTER | Age: 50
End: 2020-05-07

## 2020-06-06 DIAGNOSIS — E11.42 DIABETIC PERIPHERAL NEUROPATHY (HCC): ICD-10-CM

## 2020-06-06 RX ORDER — GABAPENTIN 300 MG/1
CAPSULE ORAL
Qty: 90 CAPSULE | Refills: 3 | OUTPATIENT
Start: 2020-06-06

## 2020-06-16 ENCOUNTER — TELEPHONE (OUTPATIENT)
Dept: FAMILY MEDICINE CLINIC | Facility: CLINIC | Age: 50
End: 2020-06-16

## 2020-07-01 ENCOUNTER — TELEPHONE (OUTPATIENT)
Dept: FAMILY MEDICINE CLINIC | Facility: CLINIC | Age: 50
End: 2020-07-01

## 2020-07-01 NOTE — TELEPHONE ENCOUNTER
Received signed request for release of laboratory results from [facility]  Phone call to them and per Dr Kvng Ralph only requesting most recent and a year ago labs  Faxed labs from 5/20/19 as they are most recent

## 2020-08-26 DIAGNOSIS — F41.9 ANXIETY: ICD-10-CM

## 2020-08-26 NOTE — TELEPHONE ENCOUNTER
Pt wife called requesting a refill on pt xanax  Pt does not take them very often and is starting a new job on Monday and is having some anxiety        Please Advise

## 2020-08-27 RX ORDER — ALPRAZOLAM 0.25 MG/1
0.25 TABLET ORAL 3 TIMES DAILY PRN
Qty: 30 TABLET | Refills: 0 | Status: SHIPPED | OUTPATIENT
Start: 2020-08-27 | End: 2021-08-04

## 2020-11-09 LAB
LEFT EYE DIABETIC RETINOPATHY: NORMAL
RIGHT EYE DIABETIC RETINOPATHY: NORMAL

## 2021-01-29 ENCOUNTER — OFFICE VISIT (OUTPATIENT)
Dept: FAMILY MEDICINE CLINIC | Facility: CLINIC | Age: 51
End: 2021-01-29
Payer: COMMERCIAL

## 2021-01-29 VITALS
SYSTOLIC BLOOD PRESSURE: 144 MMHG | HEART RATE: 104 BPM | OXYGEN SATURATION: 95 % | RESPIRATION RATE: 16 BRPM | TEMPERATURE: 97 F | BODY MASS INDEX: 34.79 KG/M2 | DIASTOLIC BLOOD PRESSURE: 84 MMHG | HEIGHT: 70 IN | WEIGHT: 243 LBS

## 2021-01-29 DIAGNOSIS — E11.69 TYPE 2 DIABETES MELLITUS WITH OTHER SPECIFIED COMPLICATION, WITHOUT LONG-TERM CURRENT USE OF INSULIN (HCC): ICD-10-CM

## 2021-01-29 DIAGNOSIS — F32.9 REACTIVE DEPRESSION: ICD-10-CM

## 2021-01-29 DIAGNOSIS — E78.5 HYPERLIPIDEMIA, UNSPECIFIED HYPERLIPIDEMIA TYPE: ICD-10-CM

## 2021-01-29 DIAGNOSIS — E11.40 TYPE 2 DIABETES MELLITUS WITH DIABETIC NEUROPATHY, WITHOUT LONG-TERM CURRENT USE OF INSULIN (HCC): Primary | ICD-10-CM

## 2021-01-29 DIAGNOSIS — E55.9 VITAMIN D DEFICIENCY: ICD-10-CM

## 2021-01-29 DIAGNOSIS — Z12.5 SCREENING PSA (PROSTATE SPECIFIC ANTIGEN): ICD-10-CM

## 2021-01-29 DIAGNOSIS — E66.9 CLASS 1 OBESITY WITH SERIOUS COMORBIDITY AND BODY MASS INDEX (BMI) OF 34.0 TO 34.9 IN ADULT, UNSPECIFIED OBESITY TYPE: ICD-10-CM

## 2021-01-29 DIAGNOSIS — I10 ESSENTIAL HYPERTENSION: ICD-10-CM

## 2021-01-29 DIAGNOSIS — E11.42 DIABETIC PERIPHERAL NEUROPATHY (HCC): ICD-10-CM

## 2021-01-29 LAB — SL AMB POCT HEMOGLOBIN AIC: 9.5 (ref ?–6.5)

## 2021-01-29 PROCEDURE — 1036F TOBACCO NON-USER: CPT | Performed by: FAMILY MEDICINE

## 2021-01-29 PROCEDURE — 3725F SCREEN DEPRESSION PERFORMED: CPT | Performed by: FAMILY MEDICINE

## 2021-01-29 PROCEDURE — 3079F DIAST BP 80-89 MM HG: CPT | Performed by: FAMILY MEDICINE

## 2021-01-29 PROCEDURE — 3046F HEMOGLOBIN A1C LEVEL >9.0%: CPT | Performed by: FAMILY MEDICINE

## 2021-01-29 PROCEDURE — 83036 HEMOGLOBIN GLYCOSYLATED A1C: CPT | Performed by: FAMILY MEDICINE

## 2021-01-29 PROCEDURE — 3077F SYST BP >= 140 MM HG: CPT | Performed by: FAMILY MEDICINE

## 2021-01-29 PROCEDURE — 3008F BODY MASS INDEX DOCD: CPT | Performed by: FAMILY MEDICINE

## 2021-01-29 PROCEDURE — 99214 OFFICE O/P EST MOD 30 MIN: CPT | Performed by: FAMILY MEDICINE

## 2021-01-29 RX ORDER — SERTRALINE HYDROCHLORIDE 100 MG/1
200 TABLET, FILM COATED ORAL DAILY
Qty: 180 TABLET | Refills: 1 | Status: SHIPPED | OUTPATIENT
Start: 2021-01-29 | End: 2022-01-17

## 2021-01-29 RX ORDER — GLIMEPIRIDE 2 MG/1
2 TABLET ORAL 2 TIMES DAILY WITH MEALS
Qty: 180 TABLET | Refills: 1 | Status: SHIPPED | OUTPATIENT
Start: 2021-01-29 | End: 2022-03-23

## 2021-01-29 RX ORDER — GABAPENTIN 300 MG/1
300 CAPSULE ORAL 3 TIMES DAILY
Qty: 270 CAPSULE | Refills: 1 | Status: SHIPPED | OUTPATIENT
Start: 2021-01-29 | End: 2022-03-23

## 2021-01-29 RX ORDER — AMLODIPINE BESYLATE 5 MG/1
5 TABLET ORAL DAILY
Qty: 90 TABLET | Refills: 1 | Status: SHIPPED | OUTPATIENT
Start: 2021-01-29 | End: 2022-04-20 | Stop reason: SDUPTHER

## 2021-01-29 RX ORDER — VALSARTAN AND HYDROCHLOROTHIAZIDE 320; 12.5 MG/1; MG/1
1 TABLET, FILM COATED ORAL DAILY
Qty: 90 TABLET | Refills: 1 | Status: SHIPPED | OUTPATIENT
Start: 2021-01-29 | End: 2021-12-08

## 2021-01-29 RX ORDER — SIMVASTATIN 40 MG
40 TABLET ORAL
Qty: 90 TABLET | Refills: 1 | Status: SHIPPED | OUTPATIENT
Start: 2021-01-29 | End: 2021-11-13 | Stop reason: SDUPTHER

## 2021-01-29 NOTE — PROGRESS NOTES
Assessment/Plan:    Patient given lab requisition for fasting labs as below  Patient to continue present treatment and discussed importance of medication compliance  Patient instructed to follow a low-fat, low-salt and a low sugar / carbohydrate diet more carefully and get regular aerobic exercise walking 150 minutes per week  Weight loss strongly encouraged and discussed losing 10% of body weight or approximately 24 lb over the next 3-6 months  Discussed morbidity associated with obesity  Patient instructed to perform home glucose monitoring daily  Return to the office in 3 months  Diabetic peripheral neuropathy (HCC)    Lab Results   Component Value Date    HGBA1C 9 5 (A) 01/29/2021     Diabetes remains poorly controlled with fingerstick hemoglobin A1c at 9 5 today  Patient to continue present treatment and discussed importance of medication compliance  Recommend home glucose monitoring daily  Diagnoses and all orders for this visit:    Type 2 diabetes mellitus with diabetic neuropathy, without long-term current use of insulin (HCC)  -     POCT hemoglobin A1c  -     CBC and Platelet; Future  -     Comprehensive metabolic panel; Future  -     Microalbumin / creatinine urine ratio; Future    Diabetic peripheral neuropathy (HCC)  -     gabapentin (NEURONTIN) 300 mg capsule; Take 1 capsule (300 mg total) by mouth 3 (three) times a day    Essential hypertension  -     Comprehensive metabolic panel; Future  -     valsartan-hydrochlorothiazide (DIOVAN-HCT) 320-12 5 MG per tablet; Take 1 tablet by mouth daily  -     amLODIPine (NORVASC) 5 mg tablet; Take 1 tablet (5 mg total) by mouth daily    Hyperlipidemia, unspecified hyperlipidemia type  -     Comprehensive metabolic panel; Future  -     Lipid panel; Future  -     simvastatin (ZOCOR) 40 mg tablet;  Take 1 tablet (40 mg total) by mouth daily at bedtime    Class 1 obesity with serious comorbidity and body mass index (BMI) of 34 0 to 34 9 in adult, unspecified obesity type  -     TSH, 3rd generation with Free T4 reflex; Future    Vitamin D deficiency  -     Vitamin D 25 hydroxy; Future    Screening PSA (prostate specific antigen)  -     PSA, Total Screen; Future    Type 2 diabetes mellitus with other specified complication, without long-term current use of insulin (HCC)  -     sitaGLIPtin-metFORMIN (JANUMET)  MG per tablet; Take 2 tablets by mouth daily  -     glimepiride (AMARYL) 2 mg tablet; Take 1 tablet (2 mg total) by mouth 2 (two) times a day with meals    Reactive depression  Comments:  Increase sertraline to 200 mg daily  Orders:  -     sertraline (ZOLOFT) 100 mg tablet; Take 2 tablets (200 mg total) by mouth daily          Subjective:      Patient ID: Amrik Jones is a 44 y o  male  Patient is here for follow-up appointment for chronic conditions and he is not fasting today  Patient has not been seen here in almost 2 years and has been noncompliant with keeping follow-up appointments and somewhat noncompliant with taking his medications  As he is taking Janumet and glimepiride only 1 daily  No regular exercise program   Patient is not following his diet carefully  Patient is up-to-date on colonoscopy and states he had diabetic eye exam with Dr Carolyn Dixon at [facility] 2-3 months ago  Patient has not done home glucose monitoring the past month or so  Diabetes  He presents for his follow-up diabetic visit  He has type 2 diabetes mellitus  His disease course has been improving  Hypoglycemia symptoms include nervousness/anxiousness  Pertinent negatives for hypoglycemia include no confusion or headaches  Associated symptoms include foot paresthesias  Pertinent negatives for diabetes include no blurred vision, no chest pain, no fatigue, no foot ulcerations, no polydipsia, no polyuria, no visual change, no weakness and no weight loss  There are no hypoglycemic complications  Symptoms are improving   Diabetic complications include peripheral neuropathy  Pertinent negatives for diabetic complications include no CVA, heart disease, nephropathy or retinopathy  Risk factors for coronary artery disease include family history, dyslipidemia, diabetes mellitus, hypertension, male sex and obesity  Current diabetic treatment includes oral agent (triple therapy)  He is compliant with treatment most of the time  His weight is stable  He is following a generally healthy diet  He participates in exercise intermittently  His home blood glucose trend is decreasing steadily  His breakfast blood glucose is taken between 7-8 am  His breakfast blood glucose range is generally 130-140 mg/dl  His dinner blood glucose is taken between 5-6 pm  His dinner blood glucose range is generally  mg/dl  An ACE inhibitor/angiotensin II receptor blocker is being taken  He does not see a podiatrist Eye exam is current  Hypertension  This is a chronic problem  The problem is uncontrolled  Associated symptoms include anxiety  Pertinent negatives include no blurred vision, chest pain, headaches, orthopnea, palpitations, peripheral edema, PND or shortness of breath  Past treatments include diuretics, calcium channel blockers and angiotensin blockers  The current treatment provides moderate improvement  Compliance problems include exercise and diet  There is no history of CAD/MI, CVA or retinopathy  Hyperlipidemia  This is a chronic problem  The problem is uncontrolled  Exacerbating diseases include diabetes and obesity  He has no history of hypothyroidism  Associated symptoms include a focal sensory loss  Pertinent negatives include no chest pain, focal weakness, leg pain, myalgias or shortness of breath  Current antihyperlipidemic treatment includes statins  The current treatment provides moderate improvement of lipids  Compliance problems include adherence to exercise and adherence to diet  Anxiety  Presents for follow-up visit   Symptoms include excessive worry and nervous/anxious behavior  Patient reports no chest pain, confusion, decreased concentration, depressed mood, hyperventilation, insomnia, irritability, palpitations, panic, restlessness, shortness of breath or suicidal ideas  Symptoms occur occasionally  The quality of sleep is fair  Nighttime awakenings: one to two  The following portions of the patient's history were reviewed and updated as appropriate: allergies, current medications, past family history, past medical history, past social history, past surgical history and problem list     Review of Systems   Constitutional: Negative for fatigue, irritability and weight loss  Eyes: Negative for blurred vision  Respiratory: Negative for shortness of breath  Cardiovascular: Negative for chest pain, palpitations, orthopnea and PND  Endocrine: Negative for polydipsia and polyuria  Musculoskeletal: Negative for myalgias  Neurological: Negative for focal weakness, weakness and headaches  Psychiatric/Behavioral: Negative for confusion, decreased concentration and suicidal ideas  The patient is nervous/anxious  The patient does not have insomnia  Objective:      /84   Pulse 104   Temp (!) 97 °F (36 1 °C)   Resp 16   Ht 5' 10 47" (1 79 m)   Wt 110 kg (243 lb)   SpO2 95%   BMI 34 40 kg/m²          Physical Exam  Constitutional:       General: He is not in acute distress  Appearance: Normal appearance  He is obese  He is not ill-appearing, toxic-appearing or diaphoretic  HENT:      Head: Normocephalic  Mouth/Throat:      Mouth: Mucous membranes are moist    Eyes:      General: No scleral icterus  Conjunctiva/sclera: Conjunctivae normal    Neck:      Musculoskeletal: Neck supple  Vascular: No carotid bruit  Cardiovascular:      Rate and Rhythm: Regular rhythm  Tachycardia present  Pulmonary:      Effort: Pulmonary effort is normal       Breath sounds: Normal breath sounds     Abdominal:      Palpations: Abdomen is soft  Tenderness: There is no abdominal tenderness  Musculoskeletal:      Right lower leg: No edema  Left lower leg: No edema  Lymphadenopathy:      Cervical: No cervical adenopathy  Skin:     General: Skin is warm and dry  Neurological:      Mental Status: He is alert and oriented to person, place, and time  Psychiatric:         Mood and Affect: Mood normal          Behavior: Behavior normal          Thought Content: Thought content normal          Judgment: Judgment normal          BMI Counseling: Body mass index is 34 4 kg/m²  The BMI is above normal  Nutrition recommendations include reducing portion sizes, decreasing overall calorie intake, 3-5 servings of fruits/vegetables daily, reducing fast food intake, consuming healthier snacks, decreasing soda and/or juice intake, moderation in carbohydrate intake and reducing intake of saturated fat and trans fat  Exercise recommendations include moderate aerobic physical activity for 150 minutes/week

## 2021-01-29 NOTE — ASSESSMENT & PLAN NOTE
Lab Results   Component Value Date    HGBA1C 9 5 (A) 01/29/2021     Diabetes remains poorly controlled with fingerstick hemoglobin A1c at 9 5 today  Patient to continue present treatment and discussed importance of medication compliance  Recommend home glucose monitoring daily

## 2021-02-22 ENCOUNTER — TELEPHONE (OUTPATIENT)
Dept: FAMILY MEDICINE CLINIC | Facility: CLINIC | Age: 51
End: 2021-02-22

## 2021-03-10 DIAGNOSIS — Z23 ENCOUNTER FOR IMMUNIZATION: ICD-10-CM

## 2021-03-17 ENCOUNTER — IMMUNIZATIONS (OUTPATIENT)
Dept: FAMILY MEDICINE CLINIC | Facility: HOSPITAL | Age: 51
End: 2021-03-17

## 2021-03-17 DIAGNOSIS — Z23 ENCOUNTER FOR IMMUNIZATION: Primary | ICD-10-CM

## 2021-03-17 PROCEDURE — 0001A SARS-COV-2 / COVID-19 MRNA VACCINE (PFIZER-BIONTECH) 30 MCG: CPT

## 2021-03-17 PROCEDURE — 91300 SARS-COV-2 / COVID-19 MRNA VACCINE (PFIZER-BIONTECH) 30 MCG: CPT

## 2021-04-07 ENCOUNTER — IMMUNIZATIONS (OUTPATIENT)
Dept: FAMILY MEDICINE CLINIC | Facility: HOSPITAL | Age: 51
End: 2021-04-07

## 2021-04-07 DIAGNOSIS — Z23 ENCOUNTER FOR IMMUNIZATION: Primary | ICD-10-CM

## 2021-04-07 PROCEDURE — 0002A SARS-COV-2 / COVID-19 MRNA VACCINE (PFIZER-BIONTECH) 30 MCG: CPT

## 2021-04-07 PROCEDURE — 91300 SARS-COV-2 / COVID-19 MRNA VACCINE (PFIZER-BIONTECH) 30 MCG: CPT

## 2021-05-12 ENCOUNTER — VBI (OUTPATIENT)
Dept: ADMINISTRATIVE | Facility: OTHER | Age: 51
End: 2021-05-12

## 2021-08-04 DIAGNOSIS — F41.9 ANXIETY: ICD-10-CM

## 2021-08-04 RX ORDER — ALPRAZOLAM 0.25 MG/1
0.25 TABLET ORAL 3 TIMES DAILY PRN
Qty: 30 TABLET | Refills: 2 | Status: SHIPPED | OUTPATIENT
Start: 2021-08-04 | End: 2022-03-24

## 2021-08-31 ENCOUNTER — VBI (OUTPATIENT)
Dept: ADMINISTRATIVE | Facility: OTHER | Age: 51
End: 2021-08-31

## 2021-10-28 ENCOUNTER — VBI (OUTPATIENT)
Dept: ADMINISTRATIVE | Facility: OTHER | Age: 51
End: 2021-10-28

## 2021-11-01 ENCOUNTER — VBI (OUTPATIENT)
Dept: ADMINISTRATIVE | Facility: OTHER | Age: 51
End: 2021-11-01

## 2021-11-03 ENCOUNTER — OFFICE VISIT (OUTPATIENT)
Dept: URGENT CARE | Facility: CLINIC | Age: 51
End: 2021-11-03
Payer: COMMERCIAL

## 2021-11-03 ENCOUNTER — HOSPITAL ENCOUNTER (EMERGENCY)
Facility: HOSPITAL | Age: 51
Discharge: HOME | End: 2021-11-03
Attending: EMERGENCY MEDICINE
Payer: COMMERCIAL

## 2021-11-03 ENCOUNTER — APPOINTMENT (EMERGENCY)
Dept: RADIOLOGY | Facility: HOSPITAL | Age: 51
End: 2021-11-03
Payer: COMMERCIAL

## 2021-11-03 VITALS
OXYGEN SATURATION: 95 % | DIASTOLIC BLOOD PRESSURE: 86 MMHG | SYSTOLIC BLOOD PRESSURE: 148 MMHG | RESPIRATION RATE: 18 BRPM | HEART RATE: 89 BPM | TEMPERATURE: 97.7 F

## 2021-11-03 VITALS
OXYGEN SATURATION: 96 % | DIASTOLIC BLOOD PRESSURE: 88 MMHG | RESPIRATION RATE: 14 BRPM | SYSTOLIC BLOOD PRESSURE: 140 MMHG | HEART RATE: 94 BPM | BODY MASS INDEX: 34.3 KG/M2 | TEMPERATURE: 98 F | WEIGHT: 245 LBS | HEIGHT: 71 IN

## 2021-11-03 DIAGNOSIS — R53.1 WEAKNESS: Primary | ICD-10-CM

## 2021-11-03 DIAGNOSIS — E11.40 TYPE 2 DIABETES MELLITUS WITH DIABETIC NEUROPATHY, WITHOUT LONG-TERM CURRENT USE OF INSULIN (HCC): ICD-10-CM

## 2021-11-03 DIAGNOSIS — R53.1 GENERALIZED WEAKNESS: Primary | ICD-10-CM

## 2021-11-03 LAB
ALBUMIN SERPL BCP-MCNC: 4 G/DL (ref 3.5–5)
ALP SERPL-CCNC: 101 U/L (ref 46–116)
ALT SERPL W P-5'-P-CCNC: 47 U/L (ref 12–78)
ANION GAP SERPL CALCULATED.3IONS-SCNC: 13 MMOL/L (ref 4–13)
AST SERPL W P-5'-P-CCNC: 22 U/L (ref 5–45)
BASOPHILS # BLD AUTO: 0.08 THOUSANDS/ΜL (ref 0–0.1)
BASOPHILS NFR BLD AUTO: 1 % (ref 0–1)
BILIRUB SERPL-MCNC: 0.6 MG/DL (ref 0.2–1)
BILIRUB UR QL STRIP: NEGATIVE
BUN SERPL-MCNC: 19 MG/DL (ref 5–25)
CALCIUM SERPL-MCNC: 9.5 MG/DL (ref 8.3–10.1)
CHLORIDE SERPL-SCNC: 100 MMOL/L (ref 100–108)
CLARITY UR: CLEAR
CO2 SERPL-SCNC: 26 MMOL/L (ref 21–32)
COLOR UR: YELLOW
CREAT SERPL-MCNC: 1.15 MG/DL (ref 0.6–1.3)
EOSINOPHIL # BLD AUTO: 0.18 THOUSAND/ΜL (ref 0–0.61)
EOSINOPHIL NFR BLD AUTO: 2 % (ref 0–6)
ERYTHROCYTE [DISTWIDTH] IN BLOOD BY AUTOMATED COUNT: 12.7 % (ref 11.6–15.1)
GFR SERPL CREATININE-BSD FRML MDRD: 72 ML/MIN/1.73SQ M
GLUCOSE SERPL-MCNC: 170 MG/DL (ref 65–140)
GLUCOSE SERPL-MCNC: 188 MG/DL (ref 65–140)
GLUCOSE UR STRIP-MCNC: NEGATIVE MG/DL
HCT VFR BLD AUTO: 38.7 % (ref 36.5–49.3)
HGB BLD-MCNC: 13.6 G/DL (ref 12–17)
HGB UR QL STRIP.AUTO: NEGATIVE
IMM GRANULOCYTES # BLD AUTO: 0.06 THOUSAND/UL (ref 0–0.2)
IMM GRANULOCYTES NFR BLD AUTO: 1 % (ref 0–2)
KETONES UR STRIP-MCNC: NEGATIVE MG/DL
LEUKOCYTE ESTERASE UR QL STRIP: NEGATIVE
LYMPHOCYTES # BLD AUTO: 2.74 THOUSANDS/ΜL (ref 0.6–4.47)
LYMPHOCYTES NFR BLD AUTO: 27 % (ref 14–44)
MCH RBC QN AUTO: 31.3 PG (ref 26.8–34.3)
MCHC RBC AUTO-ENTMCNC: 35.1 G/DL (ref 31.4–37.4)
MCV RBC AUTO: 89 FL (ref 82–98)
MONOCYTES # BLD AUTO: 0.77 THOUSAND/ΜL (ref 0.17–1.22)
MONOCYTES NFR BLD AUTO: 7 % (ref 4–12)
NEUTROPHILS # BLD AUTO: 6.51 THOUSANDS/ΜL (ref 1.85–7.62)
NEUTS SEG NFR BLD AUTO: 62 % (ref 43–75)
NITRITE UR QL STRIP: NEGATIVE
NRBC BLD AUTO-RTO: 0 /100 WBCS
PH UR STRIP.AUTO: 6.5 [PH]
PLATELET # BLD AUTO: 254 THOUSANDS/UL (ref 149–390)
PMV BLD AUTO: 10.8 FL (ref 8.9–12.7)
POTASSIUM SERPL-SCNC: 3.5 MMOL/L (ref 3.5–5.3)
PROT SERPL-MCNC: 8.5 G/DL (ref 6.4–8.2)
PROT UR STRIP-MCNC: NEGATIVE MG/DL
RBC # BLD AUTO: 4.35 MILLION/UL (ref 3.88–5.62)
SL AMB POCT GLUCOSE BLD: 170
SODIUM SERPL-SCNC: 139 MMOL/L (ref 136–145)
SP GR UR STRIP.AUTO: 1.01 (ref 1–1.03)
TROPONIN I SERPL-MCNC: <0.02 NG/ML
UROBILINOGEN UR QL STRIP.AUTO: 0.2 E.U./DL
WBC # BLD AUTO: 10.34 THOUSAND/UL (ref 4.31–10.16)

## 2021-11-03 PROCEDURE — 71045 X-RAY EXAM CHEST 1 VIEW: CPT

## 2021-11-03 PROCEDURE — S9083 URGENT CARE CENTER GLOBAL: HCPCS | Performed by: PHYSICIAN ASSISTANT

## 2021-11-03 PROCEDURE — 85025 COMPLETE CBC W/AUTO DIFF WBC: CPT | Performed by: PHYSICIAN ASSISTANT

## 2021-11-03 PROCEDURE — 93005 ELECTROCARDIOGRAM TRACING: CPT

## 2021-11-03 PROCEDURE — 99284 EMERGENCY DEPT VISIT MOD MDM: CPT | Performed by: PHYSICIAN ASSISTANT

## 2021-11-03 PROCEDURE — 84484 ASSAY OF TROPONIN QUANT: CPT | Performed by: PHYSICIAN ASSISTANT

## 2021-11-03 PROCEDURE — 82948 REAGENT STRIP/BLOOD GLUCOSE: CPT | Performed by: PHYSICIAN ASSISTANT

## 2021-11-03 PROCEDURE — 96360 HYDRATION IV INFUSION INIT: CPT

## 2021-11-03 PROCEDURE — 80053 COMPREHEN METABOLIC PANEL: CPT | Performed by: PHYSICIAN ASSISTANT

## 2021-11-03 PROCEDURE — 99285 EMERGENCY DEPT VISIT HI MDM: CPT

## 2021-11-03 PROCEDURE — G0382 LEV 3 HOSP TYPE B ED VISIT: HCPCS | Performed by: PHYSICIAN ASSISTANT

## 2021-11-03 PROCEDURE — 36415 COLL VENOUS BLD VENIPUNCTURE: CPT | Performed by: PHYSICIAN ASSISTANT

## 2021-11-03 PROCEDURE — 81003 URINALYSIS AUTO W/O SCOPE: CPT | Performed by: PHYSICIAN ASSISTANT

## 2021-11-03 RX ADMIN — SODIUM CHLORIDE 1000 ML: 0.9 INJECTION, SOLUTION INTRAVENOUS at 10:26

## 2021-11-04 ENCOUNTER — IMMUNIZATIONS (OUTPATIENT)
Dept: FAMILY MEDICINE CLINIC | Facility: HOSPITAL | Age: 51
End: 2021-11-04

## 2021-11-04 DIAGNOSIS — Z23 ENCOUNTER FOR IMMUNIZATION: Primary | ICD-10-CM

## 2021-11-04 LAB
ATRIAL RATE: 89 BPM
P AXIS: 60 DEGREES
PR INTERVAL: 176 MS
QRS AXIS: -41 DEGREES
QRSD INTERVAL: 96 MS
QT INTERVAL: 388 MS
QTC INTERVAL: 472 MS
T WAVE AXIS: 0 DEGREES
VENTRICULAR RATE: 89 BPM

## 2021-11-04 PROCEDURE — 91300 COVID-19 PFIZER VACC 0.3 ML: CPT

## 2021-11-04 PROCEDURE — 93010 ELECTROCARDIOGRAM REPORT: CPT | Performed by: INTERNAL MEDICINE

## 2021-11-04 PROCEDURE — 0001A COVID-19 PFIZER VACC 0.3 ML: CPT

## 2021-11-13 DIAGNOSIS — E78.5 HYPERLIPIDEMIA, UNSPECIFIED HYPERLIPIDEMIA TYPE: ICD-10-CM

## 2021-11-15 RX ORDER — SIMVASTATIN 40 MG
40 TABLET ORAL
Qty: 90 TABLET | Refills: 0 | Status: SHIPPED | OUTPATIENT
Start: 2021-11-15 | End: 2022-03-23

## 2021-11-16 ENCOUNTER — TELEPHONE (OUTPATIENT)
Dept: FAMILY MEDICINE CLINIC | Facility: CLINIC | Age: 51
End: 2021-11-16

## 2021-11-17 ENCOUNTER — OFFICE VISIT (OUTPATIENT)
Dept: FAMILY MEDICINE CLINIC | Facility: CLINIC | Age: 51
End: 2021-11-17
Payer: COMMERCIAL

## 2021-11-17 VITALS
HEART RATE: 100 BPM | WEIGHT: 246 LBS | TEMPERATURE: 98.9 F | BODY MASS INDEX: 34.44 KG/M2 | SYSTOLIC BLOOD PRESSURE: 140 MMHG | DIASTOLIC BLOOD PRESSURE: 84 MMHG | OXYGEN SATURATION: 98 % | RESPIRATION RATE: 16 BRPM | HEIGHT: 71 IN

## 2021-11-17 DIAGNOSIS — E11.42 DIABETIC PERIPHERAL NEUROPATHY (HCC): ICD-10-CM

## 2021-11-17 DIAGNOSIS — E78.5 HYPERLIPIDEMIA, UNSPECIFIED HYPERLIPIDEMIA TYPE: ICD-10-CM

## 2021-11-17 DIAGNOSIS — E11.40 TYPE 2 DIABETES MELLITUS WITH DIABETIC NEUROPATHY, WITHOUT LONG-TERM CURRENT USE OF INSULIN (HCC): Primary | ICD-10-CM

## 2021-11-17 DIAGNOSIS — F41.9 ANXIETY: ICD-10-CM

## 2021-11-17 DIAGNOSIS — F32.9 REACTIVE DEPRESSION: ICD-10-CM

## 2021-11-17 DIAGNOSIS — Z12.5 SCREENING PSA (PROSTATE SPECIFIC ANTIGEN): ICD-10-CM

## 2021-11-17 DIAGNOSIS — E66.9 CLASS 1 OBESITY WITH SERIOUS COMORBIDITY AND BODY MASS INDEX (BMI) OF 34.0 TO 34.9 IN ADULT, UNSPECIFIED OBESITY TYPE: ICD-10-CM

## 2021-11-17 DIAGNOSIS — E55.9 VITAMIN D DEFICIENCY: ICD-10-CM

## 2021-11-17 DIAGNOSIS — I10 PRIMARY HYPERTENSION: ICD-10-CM

## 2021-11-17 LAB — SL AMB POCT HEMOGLOBIN AIC: 7.3 (ref ?–6.5)

## 2021-11-17 PROCEDURE — 3051F HG A1C>EQUAL 7.0%<8.0%: CPT | Performed by: FAMILY MEDICINE

## 2021-11-17 PROCEDURE — 83036 HEMOGLOBIN GLYCOSYLATED A1C: CPT | Performed by: FAMILY MEDICINE

## 2021-11-17 PROCEDURE — 1036F TOBACCO NON-USER: CPT | Performed by: FAMILY MEDICINE

## 2021-11-17 PROCEDURE — 3079F DIAST BP 80-89 MM HG: CPT | Performed by: FAMILY MEDICINE

## 2021-11-17 PROCEDURE — 3008F BODY MASS INDEX DOCD: CPT | Performed by: FAMILY MEDICINE

## 2021-11-17 PROCEDURE — 3077F SYST BP >= 140 MM HG: CPT | Performed by: FAMILY MEDICINE

## 2021-11-17 PROCEDURE — 99214 OFFICE O/P EST MOD 30 MIN: CPT | Performed by: FAMILY MEDICINE

## 2021-12-01 ENCOUNTER — VBI (OUTPATIENT)
Dept: ADMINISTRATIVE | Facility: OTHER | Age: 51
End: 2021-12-01

## 2021-12-08 DIAGNOSIS — I10 ESSENTIAL HYPERTENSION: ICD-10-CM

## 2021-12-08 DIAGNOSIS — E11.69 TYPE 2 DIABETES MELLITUS WITH OTHER SPECIFIED COMPLICATION, WITHOUT LONG-TERM CURRENT USE OF INSULIN (HCC): ICD-10-CM

## 2021-12-08 RX ORDER — SITAGLIPTIN AND METFORMIN HYDROCHLORIDE 1000; 50 MG/1; MG/1
TABLET, FILM COATED ORAL
Qty: 180 TABLET | Refills: 1 | Status: SHIPPED | OUTPATIENT
Start: 2021-12-08 | End: 2022-06-08

## 2021-12-08 RX ORDER — VALSARTAN AND HYDROCHLOROTHIAZIDE 320; 12.5 MG/1; MG/1
1 TABLET, FILM COATED ORAL DAILY
Qty: 90 TABLET | Refills: 1 | Status: SHIPPED | OUTPATIENT
Start: 2021-12-08 | End: 2022-06-08

## 2021-12-10 DIAGNOSIS — R53.83 FATIGUE, UNSPECIFIED TYPE: Primary | ICD-10-CM

## 2021-12-10 DIAGNOSIS — R06.83 LOUD SNORING: ICD-10-CM

## 2021-12-10 DIAGNOSIS — E66.9 CLASS 1 OBESITY WITH SERIOUS COMORBIDITY AND BODY MASS INDEX (BMI) OF 34.0 TO 34.9 IN ADULT, UNSPECIFIED OBESITY TYPE: ICD-10-CM

## 2022-01-06 ENCOUNTER — VBI (OUTPATIENT)
Dept: ADMINISTRATIVE | Facility: OTHER | Age: 52
End: 2022-01-06

## 2022-01-11 ENCOUNTER — OFFICE VISIT (OUTPATIENT)
Dept: ENDOCRINOLOGY | Facility: HOSPITAL | Age: 52
End: 2022-01-11
Payer: COMMERCIAL

## 2022-01-11 VITALS
WEIGHT: 246.4 LBS | HEIGHT: 71 IN | SYSTOLIC BLOOD PRESSURE: 150 MMHG | HEART RATE: 124 BPM | BODY MASS INDEX: 34.5 KG/M2 | DIASTOLIC BLOOD PRESSURE: 88 MMHG

## 2022-01-11 DIAGNOSIS — E78.5 HYPERLIPIDEMIA, UNSPECIFIED HYPERLIPIDEMIA TYPE: ICD-10-CM

## 2022-01-11 DIAGNOSIS — E11.65 TYPE 2 DIABETES MELLITUS WITH HYPERGLYCEMIA, WITHOUT LONG-TERM CURRENT USE OF INSULIN (HCC): Primary | ICD-10-CM

## 2022-01-11 DIAGNOSIS — I10 PRIMARY HYPERTENSION: ICD-10-CM

## 2022-01-11 DIAGNOSIS — E11.42 DIABETIC PERIPHERAL NEUROPATHY (HCC): ICD-10-CM

## 2022-01-11 PROCEDURE — 3077F SYST BP >= 140 MM HG: CPT | Performed by: INTERNAL MEDICINE

## 2022-01-11 PROCEDURE — 3079F DIAST BP 80-89 MM HG: CPT | Performed by: INTERNAL MEDICINE

## 2022-01-11 PROCEDURE — 1036F TOBACCO NON-USER: CPT | Performed by: INTERNAL MEDICINE

## 2022-01-11 PROCEDURE — 99245 OFF/OP CONSLTJ NEW/EST HI 55: CPT | Performed by: INTERNAL MEDICINE

## 2022-01-11 NOTE — PATIENT INSTRUCTIONS
Last hgba1c was improved at 7 3%  but you are still having higher sugars int he morning  given the struggles with weight and family with diabetes, would prefer trying jardiance 10 mg daily since it can decrease heart disease risk  If sugars improve, we can decrease one of the other mediations  Drink plenty of water to prevent dehydration  We'll get you to diabetes education for nutrition  continue the same glimepiride and janumet for now  Continue to test blood sugars 2 times a day, before and 2 hours after a meal, vary the meals  Follow up in 3 months with blood work

## 2022-01-11 NOTE — PROGRESS NOTES
1/11/2022    Assessment/Plan      Diagnoses and all orders for this visit:    Type 2 diabetes mellitus with hyperglycemia, without long-term current use of insulin (HCC)  -     Empagliflozin 10 MG TABS; Take 1 tablet (10 mg total) by mouth every morning  -     Ambulatory referral to Diabetic Education; Future  -     HEMOGLOBIN A1C W/ EAG ESTIMATION Lab Collect; Future  -     Comprehensive metabolic panel Lab Collect; Future    Diabetic peripheral neuropathy (HCC)  -     HEMOGLOBIN A1C W/ EAG ESTIMATION Lab Collect; Future  -     Comprehensive metabolic panel Lab Collect; Future    Primary hypertension  -     HEMOGLOBIN A1C W/ EAG ESTIMATION Lab Collect; Future  -     Comprehensive metabolic panel Lab Collect; Future    Hyperlipidemia, unspecified hyperlipidemia type  -     HEMOGLOBIN A1C W/ EAG ESTIMATION Lab Collect; Future  -     Comprehensive metabolic panel Lab Collect; Future        Assessment/Plan:  1  Type 2 diabetes  Hemoglobin A1c is 7 3%  This is improved from last hemoglobin A1c but he is still having higher sugars in the morning  We had a discussion about possible options including addition medications  He has had a reaction to Invokana causing some nausea but I think given his struggles with weight and family history of diabetes and decrease his risk of heart disease, we should try Jardiance 10 mg daily  He is to call if he has any side effects  For now, he will continue the same glimepiride and Janumet  If he starts have lower blood sugars, he is to call we can decrease his glimepiride  He is to start checking his blood sugars before and 2 hours after meal and vary the meals  I have asked him to make sure he to drink plenty of water to prevent dehydration on the Jardiance which could worsen the chance of renal stones  He verbalized understanding of this  I have asked him to make an appointment with Diabetes Education for medical nutrition therapy as he has never had Diabetes Education  2  Diabetic neuropathy  He has unchanged neuropathic symptoms other than improved burning sensation now that he is on gabapentin  Diabetic foot exam was performed in the office today  3  Hypertension  Blood pressure was mildly elevated in the office today and remained elevated on repeat evaluation  Have asked him to follow up with his primary care physician regarding this  4  Hyperlipidemia  He will continue the same simvastatin 40 mg daily  I have asked him to follow up in 3 months with preceding hemoglobin A1c and CMP  I urine microalbumin to creatinine ratio, TSH, and lipid panel is being ordered by his primary care physician for the same time frame  CC: Diabetes Consult    History of Present Illness     HPI: Leora Shelley is a 46y o  year old male with type 2 diabetes with neuropathy for 13 years, hypertension, hyperlipidemia for evaluation/consult  He 1st started on metformin and eventually this was switched to [place]  He had tried Invokana in the past but it caused nausea  Glimepiride was added 2-3 years ago and increased 1-2 years ago  He is here for evaluation as he is never seen an endocrinologist and his mother suggested it  He is still having higher blood sugars in the morning so was concerned about his diabetes especially in light of his family history that is strong with diabetes causing amputations and early mortality  He is on oral agents at home and takes Janumet 50/1000 mg twice a day and glimepiride 2 mg twice a day  He denies any polyuria, polydipsia, polyphagia, and blurry vision  He has nocturia 0 to twice a night  He has numbness and tingling of the feet unchanged  He used to have burning of his feet that is improved  He denies chest pain or shortness of breath  He denies nephropathy, retinopathy, heart attack, stroke and claudication but does admit to neuropathy       He was doing a lower carb diet and keto diet which he thinks is what brought his hemoglobin A1c down from 9 5 to 7 3% from January to 2021  He has not been exercising as much as he is so tired after work and does go to sleep at 8:30 p m  He is going to be getting a sleep study due to his fatigue and snoring at night  Of note, he has never seen Diabetes Education  He has a strong family history of diabetes in both paternal and maternal side of the family  Father  early at 61 with amputations from diabetes and paternal aunt  in her 62s with leg amputations  Hypoglycemic episodes: No never  Will have low blood sugar symptoms in the 90's  H/o of hypoglycemia causing hospitalization or intervention such as glucagon injection  or ambulance call  No   Hypoglycemia symptoms: jitteriness and nausea  Treatment of hypoglycemia: will drink OJ or eat sugar  Glucagon:No   Medic alert tag: recommended,Yes  The patient's last eye exam was in spring 2021 with no retinopathy  The patient's last foot exam was via his PCP  He does not see Podiatry  Last A1C was   Lab Results   Component Value Date    HGBA1C 7 3 (A) 2021     Blood Sugar/Glucometer/Pump/CGM review:  He checks blood sugars twice a day and blood sugars tend to be in the upper 100s to 200s in the morning and go down to 165 after breakfast and sometimes as low as 105 in the evening  He has hyperlipidemia and takes simvastatin 40 mg daily  He denies chest pain or shortness of breath  He has hypertension and takes amlodipine 5 mg daily and valsartan/HCTZ 320/12 5 mg daily  He denies headache or stroke-like symptoms  When he was on a higher dose of amlodipine 10 mg daily, he had orthostatics symptoms and fatigue  Review of Systems   Constitutional: Positive for fatigue  Negative for unexpected weight change  Weight stable  HENT: Negative for hearing loss, tinnitus and trouble swallowing  Eyes: Positive for visual disturbance  Has floaters      Respiratory: Negative for chest tightness and shortness of breath  Cardiovascular: Negative for chest pain, palpitations and leg swelling  Gastrointestinal: Negative for abdominal pain, constipation, diarrhea and nausea  Stomach muscle were sore at one point, now better  Endocrine: Negative for polydipsia, polyphagia and polyuria  Nocturia 0-2 times a night  Genitourinary:        Erectile dysfunction, sildenafil 100 mg not as effective as he would like  Good libido  No gynecomastia  Musculoskeletal: Positive for back pain  Negative for arthralgias  Some back spasms in low back  Sleeps on side or stomach  Skin: Negative for rash and wound  Neurological: Positive for numbness  Negative for dizziness, tremors, weakness, light-headedness and headaches  Has numbness and tingling of the feet  Had been to a naturalists for herbal supplements and treatments  Had burning pain  The gabapentin may be helping  Psychiatric/Behavioral: Negative for dysphoric mood and sleep disturbance  The patient is nervous/anxious  Anxiety, uses xanax to sleep at times  Reportedly he snores loudly  Makes a lot of noises at night, no gasping for breath          Historical Information   Past Medical History:   Diagnosis Date    Anxiety     Diabetes mellitus ([facility] [address] )     Hyperlipidemia     Hypertension     Kidney stone     Psychiatric disorder     Anxiety     Past Surgical History:   Procedure Laterality Date    EXTRACORPOREAL SHOCK WAVE LITHOTRIPSY      KNEE ARTHROSCOPY Left     torn meniscus    AZ CYSTO/URETERO W/LITHOTRIPSY &INDWELL STENT INSRT Left 5/23/2017    Procedure: CYSTOSCOPY; URETEROSCOPY; HOLMIUM LASER; RETROGRADE PYELOGRAM; STENT PLACEMENT ;  Surgeon: Rika Perez MD;  [facility];  Service: Urology    [facility] ESWL Left 5/18/2017    Procedure: ESWL ;  Surgeon: Daniel Canchola MD;  [facility];  Service: Urology     Social History   Social History Substance and Sexual Activity   Alcohol Use Yes    Comment: SOCIAL     Social History     Substance and Sexual Activity   Drug Use No     Social History     Tobacco Use   Smoking Status Never Smoker   Smokeless Tobacco Never Used     Family History:   Family History   Problem Relation Age of Onset    Cancer Maternal Grandmother     Breast cancer Maternal Grandmother     Diabetes unspecified Mother         prediabetes    Thyroid disease unspecified Mother     Diabetes Father     Hypertension Father     Hyperlipidemia Father     Diabetes type II Father         toe amputation    No Known Problems Sister     Alcohol abuse Brother     Polycystic ovary syndrome Daughter         possibly, on metformin after weight gain    No Known Problems Daughter        Meds/Allergies   Current Outpatient Medications   Medication Sig Dispense Refill    ALPRAZolam (XANAX) 0 25 mg tablet TAKE 1 TABLET (0 25 MG TOTAL) BY MOUTH 3 (THREE) TIMES A DAY AS NEEDED FOR ANXIETY  30 tablet 2    amLODIPine (NORVASC) 5 mg tablet Take 1 tablet (5 mg total) by mouth daily 90 tablet 1    aspirin 81 MG tablet Take 81 mg by mouth daily      Blood Glucose Monitoring Suppl (ACCU-CHEK JESSY CONNECT) w/Device KIT by Does not apply route      Cholecalciferol (VITAMIN D3) 2000 UNITS TABS Take 1 tablet by mouth daily      gabapentin (NEURONTIN) 300 mg capsule Take 1 capsule (300 mg total) by mouth 3 (three) times a day 270 capsule 1    glimepiride (AMARYL) 2 mg tablet Take 1 tablet (2 mg total) by mouth 2 (two) times a day with meals 180 tablet 1    Janumet  MG per tablet TAKE 2 TABLETS BY MOUTH DAILY 180 tablet 1    Omega-3 Fatty Acids (FISH OIL) 1,000 mg Take 1 capsule by mouth daily      sertraline (ZOLOFT) 100 mg tablet Take 2 tablets (200 mg total) by mouth daily 180 tablet 1    sildenafil (REVATIO) 20 mg tablet TAKE 3 TABLETS BY MOUTH DAILY AS NEEDED FOR ERECTILE DYSFUNCTION   90 tablet 0    simvastatin (ZOCOR) 40 mg tablet Take 1 tablet (40 mg total) by mouth daily at bedtime 90 tablet 0    valsartan-hydrochlorothiazide (DIOVAN-HCT) 320-12 5 MG per tablet TAKE 1 TABLET BY MOUTH DAILY 90 tablet 1    Empagliflozin 10 MG TABS Take 1 tablet (10 mg total) by mouth every morning 30 tablet 6     No current facility-administered medications for this visit  No Known Allergies    Objective   Vitals: Blood pressure 150/88, pulse (!) 124, height 5' 11" (1 803 m), weight 112 kg (246 lb 6 4 oz)  Invasive Devices  Report    None                 Physical Exam  Vitals reviewed  Constitutional:       Appearance: Normal appearance  He is well-developed  He is obese  HENT:      Head: Normocephalic and atraumatic  Eyes:      Extraocular Movements: Extraocular movements intact  Conjunctiva/sclera: Conjunctivae normal       Comments: No lid lag, stare, proptosis, or periorbital edema  Neck:      Thyroid: No thyromegaly  Vascular: No carotid bruit  Comments: Thyroid normal in size  No palpable thyroid nodules  No bruits over the thyroid gland  Cardiovascular:      Rate and Rhythm: Normal rate and regular rhythm  Pulses: Normal pulses  no weak pulses          Dorsalis pedis pulses are 2+ on the right side and 2+ on the left side  Posterior tibial pulses are 2+ on the right side and 2+ on the left side  Heart sounds: Normal heart sounds  No murmur heard  Pulmonary:      Effort: Pulmonary effort is normal       Breath sounds: Normal breath sounds  No wheezing  Abdominal:      General: Bowel sounds are normal       Palpations: Abdomen is soft  Tenderness: There is no abdominal tenderness  Musculoskeletal:         General: No deformity  Normal range of motion  Cervical back: Normal range of motion and neck supple  Right lower leg: No edema  Left lower leg: No edema  Comments: No ulcerations of the feet  No tremor of the outstretched hands  No spinous process tenderness    No CVA tenderness  Feet:      Right foot:      Skin integrity: No ulcer, skin breakdown, erythema, warmth, callus or dry skin  Left foot:      Skin integrity: No ulcer, skin breakdown, erythema, warmth, callus or dry skin  Lymphadenopathy:      Cervical: No cervical adenopathy  Skin:     General: Skin is warm and dry  Findings: No erythema or rash  Neurological:      Mental Status: He is alert and oriented to person, place, and time  Deep Tendon Reflexes: Reflexes are normal and symmetric  Comments: Vibration sensation diminished to the 1st toe DIP joint bilaterally  Microfilament sensation intact bilaterally  Deep tendon reflexes normal       Patient's shoes and socks removed  Right Foot/Ankle   Right Foot Inspection  Skin Exam: skin normal and skin intact  No dry skin, no warmth, no callus, no erythema, no maceration, no abnormal color, no pre-ulcer, no ulcer and no callus  Toe Exam: ROM and strength within normal limits  No swelling    Sensory   Vibration: diminished  Monofilament testing: intact    Vascular  Capillary refills: < 3 seconds  The right DP pulse is 2+  The right PT pulse is 2+  Left Foot/Ankle  Left Foot Inspection  Skin Exam: skin normal and skin intact  No dry skin, no warmth, no erythema, no maceration, normal color, no pre-ulcer, no ulcer and no callus  Toe Exam: ROM and strength within normal limits  No swelling and no left toe deformity  Sensory   Vibration: diminished  Monofilament testing: intact    Vascular  Capillary refills: < 3 seconds  The left DP pulse is 2+  The left PT pulse is 2+  Assign Risk Category  No deformity present  Loss of protective sensation  No weak pulses  Risk: 1        The history was obtained from the review of the chart and from the patient  Lab Results:    Most recent Alc is  Lab Results   Component Value Date    HGBA1C 7 3 (A) 11/17/2021           Last hemoglobin A1c in January 2021 was 9 5%      CMP done on 11/03/2021 showed a glucose of 188 fasting but was otherwise normal     Lab Results   Component Value Date    CREATININE 1 15 11/03/2021    CREATININE 1 34 (H) 05/20/2019    CREATININE 1 11 11/15/2018    BUN 19 11/03/2021     09/02/2016    K 3 5 11/03/2021     11/03/2021    CO2 26 11/03/2021     eGFR   Date Value Ref Range Status   11/03/2021 72 ml/min/1 73sq m Final       Lab Results   Component Value Date    CHOL 155 09/02/2016    HDL 29 (L) 05/20/2019    TRIG 464 (H) 05/20/2019    CHOLHDL 6 2 (H) 05/20/2019       Lab Results   Component Value Date    ALT 47 11/03/2021    AST 22 11/03/2021    ALKPHOS 101 11/03/2021    BILITOT 1 3 (H) 09/02/2016       CBC showed white blood cell count of 10 34 but was otherwise normal         Future Appointments   Date Time Provider [facility]   1/25/2022  7:30 AM Cornelio Kendall, [address] ED QTR [facility]   4/12/2022  1:30 PM Carmel Monroe MD [address]   4/25/2022  7:20 AM John Ni PA-C ENDO QU [facility]   5/17/2022  8:15 AM DO MILTON Wooten [facility]

## 2022-01-17 DIAGNOSIS — F32.9 REACTIVE DEPRESSION: ICD-10-CM

## 2022-01-17 RX ORDER — SERTRALINE HYDROCHLORIDE 100 MG/1
TABLET, FILM COATED ORAL
Qty: 180 TABLET | Refills: 1 | Status: SHIPPED | OUTPATIENT
Start: 2022-01-17 | End: 2022-07-13

## 2022-01-25 ENCOUNTER — OFFICE VISIT (OUTPATIENT)
Dept: DIABETES SERVICES | Facility: HOSPITAL | Age: 52
End: 2022-01-25
Payer: COMMERCIAL

## 2022-01-25 VITALS — HEIGHT: 71 IN | WEIGHT: 246 LBS | BODY MASS INDEX: 34.44 KG/M2

## 2022-01-25 DIAGNOSIS — E11.65 TYPE 2 DIABETES MELLITUS WITH HYPERGLYCEMIA, WITHOUT LONG-TERM CURRENT USE OF INSULIN (HCC): Primary | ICD-10-CM

## 2022-01-25 PROCEDURE — 97802 MEDICAL NUTRITION INDIV IN: CPT | Performed by: DIETITIAN, REGISTERED

## 2022-01-25 PROCEDURE — 3008F BODY MASS INDEX DOCD: CPT | Performed by: INTERNAL MEDICINE

## 2022-01-25 NOTE — PROGRESS NOTES
Medical Nutrition Therapy     Assessment    Visit Type: Initial visit  Chief complaint:  Type 2    HPI:  Met with Dar Anderson for initial MNT  Testing blood sugars once a day, usually fasting sometimes after work  Fasting 180-225, at night after work 105-180  Food recall shows primary issues:  No previous Diabetes Education  Carbohydrate intake varies throughout the day, discussed the benefit of consistent carb intake to keep blood sugars more steady  Discussed the need to always have some carbohydrate  Together we discussed what foods contain CHO, reading a food label, serving sizes, and set a carb goal of 30g CHO/meal to promote weight loss with 15g snacks  Put together sample meals for Zac's reference and evaluated Zac's current eating plan  Good understanding, Miladys Sevilla will call with questions or for more education  Follow up as needed if not improving  Ht Readings from Last 1 Encounters:   01/25/22 5' 11" (1 803 m)     Wt Readings from Last 3 Encounters:   01/25/22 112 kg (246 lb)   01/11/22 112 kg (246 lb 6 4 oz)   11/17/21 112 kg (246 lb)        Body mass index is 34 31 kg/m²       Lab Results   Component Value Date    HGBA1C 7 3 (A) 11/17/2021    HGBA1C 9 5 (A) 01/29/2021    HGBA1C 10 1 (A) 05/20/2019       Lab Results   Component Value Date    CHOL 155 09/02/2016    CHOL 232 (H) 02/20/2015    CHOL 162 11/07/2014     Lab Results   Component Value Date    HDL 29 (L) 05/20/2019    HDL 27 (L) 11/15/2018    HDL 29 (L) 09/02/2016     Lab Results   Component Value Date    LDLCALC Comment 05/20/2019    LDLCALC 125 (H) 11/15/2018     Lab Results   Component Value Date    TRIG 464 (H) 05/20/2019    TRIG 310 (H) 11/15/2018    TRIG 316 (H) 09/02/2016     Lab Results   Component Value Date    CHOLHDL 6 2 (H) 05/20/2019       Weight Change: No    Medical Diagnosis/ICD 10 Code:  E11 65    Barriers to Learning: no barriers    Do you follow any special diet presently?: No  Who shops: patient  Who cooks: patient    Food awaiting call back from renal resident regarding phos level 0.5 today Log: Completed via the method of food recall-  for dyan working from home     Breakfast:up around [place], never a big breakfast person, recently 2 eggs most days with coffee sometimes ham  Yogurt; used to do cereal; nothing 2-3 times a week  Eats around 8:30am  Morning Snack: not much  Lunch: 12:30pm: leftovers, panera soup from the soup container; not sandwiches  Afternoon Snack: not much  If so was doing noris bars, friend from work is keto and pushes those on him  Rare apple or banana berries   Dinner: 6-7pm: burgers, country sausage, chicken in the air fryer, broccoli and green beans- just meat and veggie  If kids are over once a week will make a starch  Evening Snack: pretzels are the weekness sticks, not much else  Every other night  Eats them about 9pm, bed right after  Milk with the pretzels  Beverages: water with lemon, diet tonic water, cut out soda, diet green tea, coffee with half and half and splenda, no juice, rare cranberry, milk with pretzels at night only  Eating out/Take out: out 2-3 times a week, no fast food, sid sometimes panini  Exercise ADL, nothing structured       Nutrition Diagnosis:  Food and nutrition related knowledge deficit  related to Lack of prior exposure to accurate nutrition related information as evidenced by Verbalizes inaccurate or incomplete information    Intervention: plate method, label reading, behavior modification strategies, carbohydrate counting, individualized meal plan and monitoring portion control     Treatment Goals: Patient understands education and recommendations    Education Material Given  Melonie Avendaño was provided the Portion Booklet and Planning Healthy Meals     Monitoring and evaluation:    Term code indicator   1 6 3 Carbohydrate Intake Criteria: 30g CHO per meal, 15g CHO snacks    Patients Response to Instruction:  Brenda Bachelor  Expected Compliancegood    Thank you for coming to the [facility] for education today  Please feel free to call with any questions or concerns      Ingrid Fabian, [address]  [address] [facility] [id number]

## 2022-01-26 ENCOUNTER — VBI (OUTPATIENT)
Dept: ADMINISTRATIVE | Facility: OTHER | Age: 52
End: 2022-01-26

## 2022-01-26 NOTE — TELEPHONE ENCOUNTER
01/26/22 12:01 PM     See documentation in the [facility] SmartForm       Lea Raad

## 2022-03-23 DIAGNOSIS — F41.9 ANXIETY: ICD-10-CM

## 2022-03-24 RX ORDER — ALPRAZOLAM 0.25 MG/1
0.25 TABLET ORAL 3 TIMES DAILY PRN
Qty: 30 TABLET | Refills: 0 | Status: SHIPPED | OUTPATIENT
Start: 2022-03-24

## 2022-04-12 ENCOUNTER — OFFICE VISIT (OUTPATIENT)
Dept: SLEEP CENTER | Facility: HOSPITAL | Age: 52
End: 2022-04-12
Payer: COMMERCIAL

## 2022-04-12 VITALS
BODY MASS INDEX: 34.72 KG/M2 | WEIGHT: 248 LBS | DIASTOLIC BLOOD PRESSURE: 92 MMHG | SYSTOLIC BLOOD PRESSURE: 162 MMHG | HEIGHT: 71 IN

## 2022-04-12 DIAGNOSIS — R53.83 FATIGUE, UNSPECIFIED TYPE: ICD-10-CM

## 2022-04-12 DIAGNOSIS — E11.42 DIABETIC PERIPHERAL NEUROPATHY (HCC): ICD-10-CM

## 2022-04-12 DIAGNOSIS — I10 PRIMARY HYPERTENSION: ICD-10-CM

## 2022-04-12 DIAGNOSIS — F41.9 ANXIETY AND DEPRESSION: ICD-10-CM

## 2022-04-12 DIAGNOSIS — G47.61 PLMD (PERIODIC LIMB MOVEMENT DISORDER): ICD-10-CM

## 2022-04-12 DIAGNOSIS — G47.33 OSA (OBSTRUCTIVE SLEEP APNEA): Primary | ICD-10-CM

## 2022-04-12 DIAGNOSIS — R00.0 TACHYCARDIA: ICD-10-CM

## 2022-04-12 DIAGNOSIS — F32.A ANXIETY AND DEPRESSION: ICD-10-CM

## 2022-04-12 DIAGNOSIS — G47.19 EXCESSIVE DAYTIME SLEEPINESS: ICD-10-CM

## 2022-04-12 DIAGNOSIS — R06.83 LOUD SNORING: ICD-10-CM

## 2022-04-12 DIAGNOSIS — E66.9 CLASS 1 OBESITY WITH SERIOUS COMORBIDITY AND BODY MASS INDEX (BMI) OF 34.0 TO 34.9 IN ADULT, UNSPECIFIED OBESITY TYPE: ICD-10-CM

## 2022-04-12 DIAGNOSIS — G47.09 OTHER INSOMNIA: ICD-10-CM

## 2022-04-12 DIAGNOSIS — F45.8 BRUXISM: ICD-10-CM

## 2022-04-12 PROCEDURE — 99204 OFFICE O/P NEW MOD 45 MIN: CPT | Performed by: INTERNAL MEDICINE

## 2022-04-12 PROCEDURE — 3008F BODY MASS INDEX DOCD: CPT | Performed by: INTERNAL MEDICINE

## 2022-04-12 PROCEDURE — 3077F SYST BP >= 140 MM HG: CPT | Performed by: INTERNAL MEDICINE

## 2022-04-12 PROCEDURE — 1036F TOBACCO NON-USER: CPT | Performed by: INTERNAL MEDICINE

## 2022-04-12 PROCEDURE — 3080F DIAST BP >= 90 MM HG: CPT | Performed by: INTERNAL MEDICINE

## 2022-04-12 NOTE — PROGRESS NOTES
Review of Systems      Genitourinary need to urinate more than twice a night and difficulty with erection   Cardiology none   Gastrointestinal frequent heartburn/acid reflux   Neurology muscle weakness, numbness/tingling of an extremity, forgetfulness, poor concentration or confusion,  and balance problems   Constitutional claustrophobia, fatigue and excessive sweating at night   Integumentary none   Psychiatry anxiety, depression and aggressiveness or irritability   Musculoskeletal legs twitching/jerking   Pulmonary snoring   ENT throat clearing   Endocrine frequent urination   Hematological none

## 2022-04-12 NOTE — PATIENT INSTRUCTIONS
What is TRACEY? Obstructive sleep apnea is a common and serious sleep disorder that causes you to stop breathing during sleep  The airway repeatedly becomes blocked, limiting the amount of air that reaches your lungs  When this happens, you may snore loudly or making choking noises as you try to breathe  Your brain and body becomes oxygen deprived and you may wake up  This may happen a few times a night, or in more severe cases, several hundred times a night  Sleep apnea can make you wake up in the morning feeling tired or unrefreshed even though you have had a full night of sleep  During the day, you may feel fatigued, have difficulty concentrating or you may even unintentionally fall asleep  This is because your body is waking up numerous times throughout the night, even though you might not be conscious of each awakening  The lack of oxygen your body receives can have negative long-term consequences for your health  This includes:  High blood pressure  Heart disease  Irregular heart rhythms  Stroke  Pre-diabetes and diabetes  Depression    Testing  An objective evaluation of your sleep may be needed before your board certified sleep physician can make a diagnosis  Options include:   In-lab overnight sleep study  This type of sleep study requires you to stay overnight at a sleep center, in a bed that may resemble a hotel room  You will sleep with sensors hooked up to various parts of your body  These sensors record your brain waves, heartbeat, breathing and movement  An overnight sleep study also provides your doctor with the most complete information about your sleep  Learn more about an overnight sleep study      Home sleep apnea test  Some patients with high risk factors for obstructive sleep apnea and no other medical disorders may be candidates for a home sleep apnea test  The testing equipment differs in that it is less complicated than what is used in an overnight sleep study   As such, does not give all the data an in-lab will and if negative, may not mean you do not have the problem  Treatment for sleep apnea  includes using a continuous positive airway pressure (CPAP) machine to keep your airway open during sleep  A mask is placed over your nose and mouth, or just your nose  The mask is hooked to the CPAP machine that blows a gentle stream of air into the mask when you breathe  This helps keep your airway open so you can breathe more regularly  Extra oxygen may be given to you through the machine  You may be given a mouth device  It looks like a mouth guard or dental retainer and stops your tongue and mouth tissues from blocking your throat while you sleep  Surgery may be needed to remove extra tissues that block your mouth, throat, or nose  Manage sleep apnea:   Do not smoke  Nicotine and other chemicals in cigarettes and cigars can cause lung damage  Ask your healthcare provider for information if you currently smoke and need help to quit  E-cigarettes or smokeless tobacco still contain nicotine  Talk to your healthcare provider before you use these products  Do not drink alcohol or take sedative medicine before you go to sleep  Alcohol and sedatives can relax the muscles and tissues around your throat  This can block the airflow to your lungs  Maintain a healthy weight  Excess tissue around your throat may restrict your breathing  Ask your healthcare provider for information if you need to lose weight  Sleep on your side or use pillows designed to prevent sleep apnea  This prevents your tongue or other tissues from blocking your throat  You can also raise the head of your bed  Driving Safety  Refrain from driving when drowsy  Follow up with your healthcare provider as directed:  Write down your questions so you remember to ask them during your visits  Go to [employer] website for more information: Sleepeducation  org     What is TRACEY?    Obstructive sleep apnea is a common and serious sleep disorder that causes you to stop breathing during sleep  The airway repeatedly becomes blocked, limiting the amount of air that reaches your lungs  When this happens, you may snore loudly or making choking noises as you try to breathe  Your brain and body becomes oxygen deprived and you may wake up  This may happen a few times a night, or in more severe cases, several hundred times a night  Sleep apnea can make you wake up in the morning feeling tired or unrefreshed even though you have had a full night of sleep  During the day, you may feel fatigued, have difficulty concentrating or you may even unintentionally fall asleep  This is because your body is waking up numerous times throughout the night, even though you might not be conscious of each awakening  The lack of oxygen your body receives can have negative long-term consequences for your health  This includes:  High blood pressure  Heart disease  Irregular heart rhythms  Stroke  Pre-diabetes and diabetes  Depression    Testing  An objective evaluation of your sleep may be needed before your board certified sleep physician can make a diagnosis  Options include:   In-lab overnight sleep study  This type of sleep study requires you to stay overnight at a sleep center, in a bed that may resemble a hotel room  You will sleep with sensors hooked up to various parts of your body  These sensors record your brain waves, heartbeat, breathing and movement  An overnight sleep study also provides your doctor with the most complete information about your sleep  Learn more about an overnight sleep study      Home sleep apnea test  Some patients with high risk factors for obstructive sleep apnea and no other medical disorders may be candidates for a home sleep apnea test  The testing equipment differs in that it is less complicated than what is used in an overnight sleep study   As such, does not give all the data an in-lab will and if negative, may not mean you do not have the problem  Treatment for sleep apnea  includes using a continuous positive airway pressure (CPAP) machine to keep your airway open during sleep  A mask is placed over your nose and mouth, or just your nose  The mask is hooked to the CPAP machine that blows a gentle stream of air into the mask when you breathe  This helps keep your airway open so you can breathe more regularly  Extra oxygen may be given to you through the machine  You may be given a mouth device  It looks like a mouth guard or dental retainer and stops your tongue and mouth tissues from blocking your throat while you sleep  Surgery may be needed to remove extra tissues that block your mouth, throat, or nose  Manage sleep apnea:   Do not smoke  Nicotine and other chemicals in cigarettes and cigars can cause lung damage  Ask your healthcare provider for information if you currently smoke and need help to quit  E-cigarettes or smokeless tobacco still contain nicotine  Talk to your healthcare provider before you use these products  Do not drink alcohol or take sedative medicine before you go to sleep  Alcohol and sedatives can relax the muscles and tissues around your throat  This can block the airflow to your lungs  Maintain a healthy weight  Excess tissue around your throat may restrict your breathing  Ask your healthcare provider for information if you need to lose weight  Sleep on your side or use pillows designed to prevent sleep apnea  This prevents your tongue or other tissues from blocking your throat  You can also raise the head of your bed  Driving Safety  Refrain from driving when drowsy  Follow up with your healthcare provider as directed:  Write down your questions so you remember to ask them during your visits  Go to AASM website for more information: Sleepeducation  org       What you can do to improve your sleep: (Sleep Hygiene) Basic rules for a good night's sleep    Create a regular sleep schedule    This will help you form a sleep routine  Keep a record of your sleep patterns, and any sleeping problems you have  Bring the record to follow-up visits with healthcare providers  Avoid prolonged use of light-emitting screens before bedtime or watching TV in bed  Avoid forcing sleep  Do not take naps  Naps could make it hard for you to fall asleep at bedtime  Deal with your worries before bedtime  Keep your bedroom cool, quiet, and dark  Turn on white noise, such as a fan, to help you relax  Do not use your bed for any activity that will keep you awake  Do not read, exercise, eat, or watch TV in your bedroom  Get up if you do not fall asleep within 20 minutes  Move to another room and do something relaxing until you become sleepy  Limit caffeine, alcohol, nicotine and food to earlier in the day  Only drink caffeine in the morning  Do not drink alcohol within 6 hours of bedtime  Do not eat a heavy meal right before you go to bed  Avoid smoking, especially in the evening  Exercise regularly  Daily exercise will help you sleep better  Do not exercise within 4 hours of bedtime  Stimulus control therapy rules  1  Go to bed only when sleepy  2  Do not watch television, read, eat, or worry while in bed  Use bed only for sleep and sex  3  Get out of bed if unable to fall asleep within 20 minutes and go to another room  Return to bed only when sleepy  Repeat this step as many times as necessary throughout the night  4  Set an alarm clock to wake up at a fixed time each morning, including weekends  5  Do not take a nap during the day  Data from: [address] [place] [address], [address] [employer] Nonpharmacologic treatments of insomnia  J [facility] 5002; 53:37  Go to AASM website for more information: Sleepeducation  org     Recommended Reading:  Book by authors [address]   No More sleepless nights          Nursing Support:  When: Monday through Friday 7A-5PM except holidays  Where: Our direct line is [phone]  If you are having a true emergency please call 911  In the event that the line is busy or it is after hours please leave a voice message and we will return your call  Please speak clearly, leaving your full name, birth date, best number to reach you and the reason for your call  Medication refills: We will need the name of the medication, the dosage, the ordering provider, whether you get a 30 or 90 day refill, and the pharmacy name and address  Medications will be ordered by the provider only  Nurses cannot call in prescriptions  Please allow 7 days for medication refills  Physician requested updates: If your provider requested that you call with an update after starting medication, please be ready to provide us the medication and dosage, what time you take your medication, the time you attempt to fall asleep, time you fall asleep, when you wake up, and what time you get out of bed  Sleep Study Results: We will contact you with sleep study results and/or next steps after the physician has reviewed your testing

## 2022-04-12 NOTE — PROGRESS NOTES
Consultation - Sleep Center   Clearance Pod  45 y o  male  :1970  OYV:[id number]  DOS:2022    Physician Requesting Consult: Marcell Bustamante DO             Reason for Consult : At your kind request I saw Clearance Pod for initial sleep evaluation today  The patient is here to evaluate for suspected Obstructive Sleep Apnea  PFSH, Problem List, Medications & Allergies were reviewed in EMR  Darrell Jesus  has a past medical history of Anxiety, Diabetes mellitus ([address] ), Hyperlipidemia, Hypertension, Kidney stone, and Psychiatric disorder  He has a current medication list which includes the following prescription(s): alprazolam, amlodipine, aspirin, accu-chek anne marie connect, vitamin d3, empagliflozin, gabapentin, glimepiride, janumet, fish oil, sertraline, sildenafil, simvastatin, and valsartan-hydrochlorothiazide  HPI:  He typically sleeps alone but recently when sharing a room with family has been told that he snores  Previously it has been noted that he snoring can be heard through the walls   Others have witnessed him gasping  He is not aware of snoring or breathing difficulties during sleep  Other Complaints: none  Restless Leg Syndrome: has suggestive symptoms and feels legs are constantly moving during sleep;   Parasomnia: no features reported    Sleep Routine (on average):   Typical Bedtime:  8:30 p m  Gets OOB:  7:00 a m  TIB:10 5 hrs  Sleep latency: upto 60 minutes and attributes to racing thoughts  He is on Zoloft for anxiety and depression  He also has Xanax that he uses infrequently     Sleep Interruptions:3-4/nite because of nocturia and is able to fall back asleep  Awakens: with aid of an alarm  Upon awakening: never feels rested   He estimates getting 8-9 hrs sleep  Darrell Jesus reports Excessive Daytime Sleepiness feels like napping & does when has the opportunity   He rated himself at Total score: 12 /24 on the Gaines Sleepiness Scale       Habits:  reports that he has never smoked  He has never used smokeless tobacco  ;   E-Cigarette/Vaping    E-Cigarette Use Never User     ;  reports no history of drug use ;  reports current alcohol use  ; Caffeine use:limited ; Exercise routine: none   Family History:  Suspects father had obstructive sleep apnea  ROS - reviewed and as attached  Significant for weight has been stable  He has some postnasal drip  He reported no respiratory or cardiac symptoms  He has acid reflux that is worse at night  He has difficulty with memory, feelings of anxiety and depression  He he is claustrophobic but felt he would be able to tolerate CPAP  EXAM:  /92 (BP Location: Right arm, Patient Position: Sitting, Cuff Size: Large)   Ht 5' 11" (1 803 m)   Wt 112 kg (248 lb)   BMI 34 59 kg/m²    General: Well groomed male, well appearing, in no apparent distress  Neurological: Alert and orientated;  cooperative; Cranial nerves intact;    Psychiatric: Speech:clear and coherent;  Normal mood, affect & thought   Skin: warm and dry; Color& Hydration good; no facial rashes or lesions   HEENT:  Craniofacial anatomy: normal Sinuses: non- tender  TMJ: Normal    Eyes: EOM's intact;  conjunctiva/corneas clear   Ears: Externallyappear normal     Nasal Airway: is patent Septum:intact; Mucosa: normal; Turbinates: normal; Rhinorrhea: None   Mouth: Lips: normal posture; Dentition: worn down  Mucosa:moist  ; Hard Palate:normal    Oropharryx: crowded and AP narrowing Tongue: Mallampati:Class II and MobileSoft Palate:  redundant  and Uvular Hypertrophy Tonsils: absent  Neck:is thick; Neck Circumference: 18 "; Supple; no abnormal masses; Thyroid:normal  Trachea:central     Lymph: No Cervical or Submandibular Lymhadenopathy  Heart: S1,S2 normal; tachycardia; RRR; no gallop; no murmurs   Lungs: Respiratory Effort:normal  Air entry good bilaterally  No wheezes  No rales  Abdomen: Obese, Soft & non-tender    Extremities: No pedal edema  No clubbing or cyanosis  Musculoskeletal:  Motor normal; Gait:normal        IMPRESSION: Primary/Secondary Sleep Diagnoses (to Medical or Psych conditions) & Comorbidities   1  TRACEY (obstructive sleep apnea)  Diagnostic Sleep Study   2  Other insomnia     3  PLMD (periodic limb movement disorder)     4  Excessive daytime sleepiness     5  Bruxism     6  Primary hypertension     7  Tachycardia     8  Anxiety and depression     9  Loud snoring  Ambulatory referral to Sleep Medicine   10  Fatigue, unspecified type  Ambulatory referral to Sleep Medicine   11  Class 1 obesity with serious comorbidity and body mass index (BMI) of 34 0 to 34 9 in adult, unspecified obesity type  Ambulatory referral to Sleep Medicine   12  Diabetic peripheral neuropathy (HCC)          PLAN:   With respect to above conditions, comprehensive counseling provided on pathophysiology; effects on symptoms and comorbidities, diagnostic strategies & limitations; treatment options; risks or no treament; risks & benefits of the various therapeutic options; costs and insurance aspects  I advised weight reduction, avoiding sleeping supine, using alcohol or sedating medications close to bed time and on safe driving practices  Nocturnal polysomnography is indicated and a diagnostic study will be scheduled  Home sleep testing is contraindicated because Severe insomnia and RLS  Patient is willing to try Positive airway pressure therapy and will be scheduled for a titration study if indicated  Multi component Cognitive behavioral therapy for Insomnia undertaken - Sleep Restriction, Stimulus control, Relaxation techniques and Sleep hygiene were discussed  Follow-up to be scheduled after the studies to review results, further details of treatment options and to initiate/adjust therapy      Sincerely,     Authenticated electronically by Barbie Paige MD   on 73/15/74   Board Certified Specialist     Portions of the record may have been created with voice recognition software  Occasional wrong word or "sound a like" substitutions may have occurred due to the inherent limitations of voice recognition software  There may also be notations and random deletions of words or characters from malfunctioning software  Read the chart carefully and recognize, using context, where substitutions/deletions have occurred

## 2022-04-20 DIAGNOSIS — I10 ESSENTIAL HYPERTENSION: ICD-10-CM

## 2022-04-20 RX ORDER — AMLODIPINE BESYLATE 5 MG/1
5 TABLET ORAL DAILY
Qty: 90 TABLET | Refills: 0 | Status: SHIPPED | OUTPATIENT
Start: 2022-04-20 | End: 2022-05-17 | Stop reason: SDUPTHER

## 2022-04-20 RX ORDER — AMLODIPINE BESYLATE 5 MG/1
5 TABLET ORAL DAILY
Qty: 90 TABLET | Refills: 0 | Status: SHIPPED | OUTPATIENT
Start: 2022-04-20 | End: 2022-04-20

## 2022-05-03 ENCOUNTER — VBI (OUTPATIENT)
Dept: ADMINISTRATIVE | Facility: OTHER | Age: 52
End: 2022-05-03

## 2022-05-05 ENCOUNTER — APPOINTMENT (OUTPATIENT)
Dept: LAB | Age: 52
End: 2022-05-05
Payer: COMMERCIAL

## 2022-05-05 DIAGNOSIS — E11.42 DIABETIC PERIPHERAL NEUROPATHY (HCC): ICD-10-CM

## 2022-05-05 DIAGNOSIS — E11.40 TYPE 2 DIABETES MELLITUS WITH DIABETIC NEUROPATHY, WITHOUT LONG-TERM CURRENT USE OF INSULIN (HCC): ICD-10-CM

## 2022-05-05 DIAGNOSIS — E66.9 CLASS 1 OBESITY WITH SERIOUS COMORBIDITY AND BODY MASS INDEX (BMI) OF 34.0 TO 34.9 IN ADULT, UNSPECIFIED OBESITY TYPE: ICD-10-CM

## 2022-05-05 DIAGNOSIS — E11.65 TYPE 2 DIABETES MELLITUS WITH HYPERGLYCEMIA, WITHOUT LONG-TERM CURRENT USE OF INSULIN (HCC): ICD-10-CM

## 2022-05-05 DIAGNOSIS — I10 PRIMARY HYPERTENSION: ICD-10-CM

## 2022-05-05 DIAGNOSIS — Z12.5 SCREENING PSA (PROSTATE SPECIFIC ANTIGEN): ICD-10-CM

## 2022-05-05 DIAGNOSIS — E55.9 VITAMIN D DEFICIENCY: ICD-10-CM

## 2022-05-05 DIAGNOSIS — E78.5 HYPERLIPIDEMIA, UNSPECIFIED HYPERLIPIDEMIA TYPE: ICD-10-CM

## 2022-05-05 LAB
25(OH)D3 SERPL-MCNC: 28 NG/ML (ref 30–100)
ALBUMIN SERPL BCP-MCNC: 4.2 G/DL (ref 3.5–5)
ALP SERPL-CCNC: 97 U/L (ref 46–116)
ALT SERPL W P-5'-P-CCNC: 43 U/L (ref 12–78)
ANION GAP SERPL CALCULATED.3IONS-SCNC: 8 MMOL/L (ref 4–13)
AST SERPL W P-5'-P-CCNC: 29 U/L (ref 5–45)
BILIRUB SERPL-MCNC: 1.05 MG/DL (ref 0.2–1)
BUN SERPL-MCNC: 24 MG/DL (ref 5–25)
CALCIUM SERPL-MCNC: 10.7 MG/DL (ref 8.3–10.1)
CHLORIDE SERPL-SCNC: 99 MMOL/L (ref 100–108)
CHOLEST SERPL-MCNC: 208 MG/DL
CO2 SERPL-SCNC: 30 MMOL/L (ref 21–32)
CREAT SERPL-MCNC: 1.33 MG/DL (ref 0.6–1.3)
CREAT UR-MCNC: 91.9 MG/DL
EST. AVERAGE GLUCOSE BLD GHB EST-MCNC: 189 MG/DL
GFR SERPL CREATININE-BSD FRML MDRD: 60 ML/MIN/1.73SQ M
GLUCOSE P FAST SERPL-MCNC: 201 MG/DL (ref 65–99)
HBA1C MFR BLD: 8.2 %
HDLC SERPL-MCNC: 31 MG/DL
MICROALBUMIN UR-MCNC: 163 MG/L (ref 0–20)
MICROALBUMIN/CREAT 24H UR: 177 MG/G CREATININE (ref 0–30)
NONHDLC SERPL-MCNC: 177 MG/DL
POTASSIUM SERPL-SCNC: 3.7 MMOL/L (ref 3.5–5.3)
PROT SERPL-MCNC: 8 G/DL (ref 6.4–8.2)
PSA SERPL-MCNC: 0.8 NG/ML (ref 0–4)
SODIUM SERPL-SCNC: 137 MMOL/L (ref 136–145)
TRIGL SERPL-MCNC: 432 MG/DL
TSH SERPL DL<=0.05 MIU/L-ACNC: 1.23 UIU/ML (ref 0.45–4.5)

## 2022-05-05 PROCEDURE — 82570 ASSAY OF URINE CREATININE: CPT

## 2022-05-05 PROCEDURE — 36415 COLL VENOUS BLD VENIPUNCTURE: CPT

## 2022-05-05 PROCEDURE — 83036 HEMOGLOBIN GLYCOSYLATED A1C: CPT

## 2022-05-05 PROCEDURE — 82043 UR ALBUMIN QUANTITATIVE: CPT

## 2022-05-05 PROCEDURE — 80061 LIPID PANEL: CPT

## 2022-05-05 PROCEDURE — G0103 PSA SCREENING: HCPCS

## 2022-05-05 PROCEDURE — 84443 ASSAY THYROID STIM HORMONE: CPT

## 2022-05-05 PROCEDURE — 80053 COMPREHEN METABOLIC PANEL: CPT

## 2022-05-05 PROCEDURE — 82306 VITAMIN D 25 HYDROXY: CPT

## 2022-05-05 PROCEDURE — 3060F POS MICROALBUMINURIA REV: CPT | Performed by: FAMILY MEDICINE

## 2022-05-05 PROCEDURE — 3052F HG A1C>EQUAL 8.0%<EQUAL 9.0%: CPT | Performed by: FAMILY MEDICINE

## 2022-05-10 ENCOUNTER — RA CDI HCC (OUTPATIENT)
Dept: OTHER | Facility: HOSPITAL | Age: 52
End: 2022-05-10

## 2022-05-10 NOTE — PROGRESS NOTES
[place] [address]  coding opportunities          Chart Reviewed number of suggestions sent to Provider: 3   1) E11 22: Type 2 diabetes mellitus with diabetic chronic kidney disease ([place] [address] )     R04  2  Chronic kidney disease, stage 2  -----According to the ICD 10 coding guidelines, these above two conditions are presumed to have    a causal-effect relationship and thus are always coded together, unless the documentation states they are not related to each other  2) Refer to [facility] lab  E11 29, R80 9  T2DM with other diabetic kidney complication, Microalbuminemia ([place] [address] )    3) F32  A Depression: found on active problem list, Can Depression be further classified  Patients Insurance        Commercial Insurance: [address] [place] [address]

## 2022-05-17 ENCOUNTER — OFFICE VISIT (OUTPATIENT)
Dept: FAMILY MEDICINE CLINIC | Facility: CLINIC | Age: 52
End: 2022-05-17
Payer: COMMERCIAL

## 2022-05-17 VITALS
SYSTOLIC BLOOD PRESSURE: 148 MMHG | RESPIRATION RATE: 16 BRPM | HEART RATE: 84 BPM | DIASTOLIC BLOOD PRESSURE: 100 MMHG | OXYGEN SATURATION: 100 % | BODY MASS INDEX: 34.3 KG/M2 | TEMPERATURE: 97.8 F | WEIGHT: 245 LBS | HEIGHT: 71 IN

## 2022-05-17 DIAGNOSIS — E78.5 HYPERLIPIDEMIA, UNSPECIFIED HYPERLIPIDEMIA TYPE: ICD-10-CM

## 2022-05-17 DIAGNOSIS — Z11.59 NEED FOR HEPATITIS C SCREENING TEST: ICD-10-CM

## 2022-05-17 DIAGNOSIS — Z11.4 SCREENING FOR HIV (HUMAN IMMUNODEFICIENCY VIRUS): ICD-10-CM

## 2022-05-17 DIAGNOSIS — E55.9 VITAMIN D DEFICIENCY: ICD-10-CM

## 2022-05-17 DIAGNOSIS — E11.42 DIABETIC PERIPHERAL NEUROPATHY (HCC): Primary | ICD-10-CM

## 2022-05-17 DIAGNOSIS — E66.9 CLASS 1 OBESITY WITH SERIOUS COMORBIDITY AND BODY MASS INDEX (BMI) OF 34.0 TO 34.9 IN ADULT, UNSPECIFIED OBESITY TYPE: ICD-10-CM

## 2022-05-17 DIAGNOSIS — F41.9 ANXIETY: ICD-10-CM

## 2022-05-17 DIAGNOSIS — I10 ESSENTIAL HYPERTENSION: ICD-10-CM

## 2022-05-17 DIAGNOSIS — F33.9 DEPRESSION, RECURRENT (HCC): ICD-10-CM

## 2022-05-17 PROCEDURE — 1036F TOBACCO NON-USER: CPT | Performed by: FAMILY MEDICINE

## 2022-05-17 PROCEDURE — 99214 OFFICE O/P EST MOD 30 MIN: CPT | Performed by: FAMILY MEDICINE

## 2022-05-17 PROCEDURE — 3725F SCREEN DEPRESSION PERFORMED: CPT | Performed by: FAMILY MEDICINE

## 2022-05-17 RX ORDER — AMLODIPINE BESYLATE 5 MG/1
5 TABLET ORAL DAILY
Qty: 90 TABLET | Refills: 1 | Status: SHIPPED | OUTPATIENT
Start: 2022-05-17

## 2022-05-17 NOTE — PROGRESS NOTES
Assessment/Plan:  Patient to continue present treatment and discussed importance of restarting amlodipine ASAP  Patient instructed to follow a low-fat, low-salt and a low sugar/carbohydrate diet more carefully continue regular aerobic exercise walking 150 minutes per week  Weight loss strongly encouraged and discussed losing 10% of body weight or approximately 25 lb over the next 3-6 months  Discussed importance of increased water intake and adequate hydration  Patient encouraged to schedule diabetic eye exam   Follow up with specialists as scheduled and return to the office in 6 months  Diagnoses and all orders for this visit:    Diabetic peripheral neuropathy ([address] )    Essential hypertension  -     amLODIPine (NORVASC) 5 mg tablet; Take 1 tablet (5 mg total) by mouth in the morning  Hyperlipidemia, unspecified hyperlipidemia type    Class 1 obesity with serious comorbidity and body mass index (BMI) of 34 0 to 34 9 in adult, unspecified obesity type    Depression, recurrent (HCC)    Anxiety    Vitamin D deficiency    Need for hepatitis C screening test  -     Hepatitis C Antibody ([facility]); Future    Screening for HIV (human immunodeficiency virus)  -     HIV 1/2 Antigen/Antibody (4th Generation) w Reflex [facility]; Future          Subjective:      Patient ID: Loly Stoddard is a 45 y o  male  Patient is here for follow-up appoint for chronic conditions and we reviewed recent fasting labs per Endocrinology  Patient has a follow-up appoint with Dr Rene C[facility]denas at [facility] on 05/24/2022 to further review labs  Patient has started attending Diabetes Education classes  Patient scheduled for sleep study on 07/21/2022  Past 3 weeks patient has started walking 3 times a week for 2 to 2-1/2 miles  Patient is up-to-date on diabetic foot exam and due for diabetic eye exam and encouraged to schedule with Dr Roxane Cheema  Patient is up-to-date on colon cancer screening    Patient ran out of amlodipine 3 weeks ago and is having difficult time refilling [facility] to [facility]   New refill order sent in today electronically  Diabetes  He presents for his follow-up diabetic visit  He has type 2 diabetes mellitus  His disease course has been stable  There are no hypoglycemic associated symptoms  Pertinent negatives for hypoglycemia include no headaches  Associated symptoms include fatigue and foot paresthesias  Pertinent negatives for diabetes include no blurred vision, no chest pain, no foot ulcerations, no polydipsia, no polyuria, no visual change, no weakness and no weight loss  There are no hypoglycemic complications  Symptoms are stable  Diabetic complications include nephropathy and peripheral neuropathy  Pertinent negatives for diabetic complications include no CVA, heart disease or retinopathy  Risk factors for coronary artery disease include family history, dyslipidemia, diabetes mellitus, hypertension, male sex and obesity  Current diabetic treatment includes oral agent (triple therapy)  He is compliant with treatment all of the time  His weight is stable  He is following a generally healthy diet  He participates in exercise three times a week  There is no change in his home blood glucose trend  His breakfast blood glucose is taken between 6-7 am  His breakfast blood glucose range is generally 180-200 mg/dl  His dinner blood glucose is taken between 5-6 pm  His dinner blood glucose range is generally 130-140 mg/dl  An ACE inhibitor/angiotensin II receptor blocker is being taken  He does not see a podiatrist Eye exam is not current  Hypertension  This is a chronic problem  The problem is uncontrolled  Pertinent negatives include no anxiety, blurred vision, chest pain, headaches, orthopnea, palpitations, peripheral edema, PND or shortness of breath  Past treatments include angiotensin blockers, calcium channel blockers and diuretics  The current treatment provides moderate improvement   There are no compliance problems  There is no history of CAD/MI, CVA or retinopathy  The following portions of the patient's history were reviewed and updated as appropriate: allergies, current medications, past family history, past medical history, past social history, past surgical history and problem list     Review of Systems   Constitutional: Positive for fatigue  Negative for weight loss  Eyes: Negative for blurred vision  Respiratory: Negative for shortness of breath  Cardiovascular: Negative for chest pain, palpitations, orthopnea and PND  Endocrine: Negative for polydipsia and polyuria  Neurological: Negative for weakness and headaches  Objective:      /100   Pulse 84   Temp 97 8 °F (36 6 °C) (Skin)   Resp 16   Ht 5' 11" (1 803 m)   Wt 111 kg (245 lb)   SpO2 100%   BMI 34 17 kg/m²          Physical Exam  Constitutional:       General: He is not in acute distress  Appearance: Normal appearance  He is obese  HENT:      Head: Normocephalic  Mouth/Throat:      Mouth: Mucous membranes are moist    Eyes:      General: No scleral icterus  Conjunctiva/sclera: Conjunctivae normal    Neck:      Vascular: No carotid bruit  Cardiovascular:      Rate and Rhythm: Normal rate and regular rhythm  Pulmonary:      Effort: Pulmonary effort is normal       Breath sounds: Normal breath sounds  Abdominal:      Palpations: Abdomen is soft  Tenderness: There is no abdominal tenderness  Musculoskeletal:      Cervical back: Neck supple  Right lower leg: No edema  Left lower leg: No edema  Lymphadenopathy:      Cervical: No cervical adenopathy  Skin:     General: Skin is warm and dry  Neurological:      General: No focal deficit present  Mental Status: He is alert and oriented to person, place, and time  Psychiatric:         Mood and Affect: Mood normal          Behavior: Behavior normal          Thought Content:  Thought content normal          Judgment: Judgment normal          BMI Counseling: Body mass index is 34 17 kg/m²  The BMI is above normal  Nutrition recommendations include reducing portion sizes, decreasing overall calorie intake, 3-5 servings of fruits/vegetables daily, reducing fast food intake, consuming healthier snacks, decreasing soda and/or juice intake, moderation in carbohydrate intake, increasing intake of lean protein, reducing intake of saturated fat and trans fat and reducing intake of cholesterol  Exercise recommendations include moderate aerobic physical activity for 150 minutes/week

## 2022-05-24 ENCOUNTER — OFFICE VISIT (OUTPATIENT)
Dept: ENDOCRINOLOGY | Facility: HOSPITAL | Age: 52
End: 2022-05-24

## 2022-05-24 VITALS
BODY MASS INDEX: 34.58 KG/M2 | DIASTOLIC BLOOD PRESSURE: 94 MMHG | WEIGHT: 247 LBS | HEIGHT: 71 IN | SYSTOLIC BLOOD PRESSURE: 142 MMHG | HEART RATE: 119 BPM

## 2022-05-24 DIAGNOSIS — E11.65 TYPE 2 DIABETES MELLITUS WITH HYPERGLYCEMIA, WITHOUT LONG-TERM CURRENT USE OF INSULIN (HCC): Primary | ICD-10-CM

## 2022-05-24 DIAGNOSIS — E11.69 TYPE 2 DIABETES MELLITUS WITH OTHER SPECIFIED COMPLICATION, WITHOUT LONG-TERM CURRENT USE OF INSULIN (HCC): ICD-10-CM

## 2022-05-24 PROCEDURE — 3008F BODY MASS INDEX DOCD: CPT | Performed by: FAMILY MEDICINE

## 2022-05-24 RX ORDER — GLIMEPIRIDE 4 MG/1
4 TABLET ORAL 2 TIMES DAILY WITH MEALS
Qty: 180 TABLET | Refills: 1 | Status: SHIPPED | OUTPATIENT
Start: 2022-05-24

## 2022-05-24 NOTE — PATIENT INSTRUCTIONS
Monitor diet and maintain physical activity  Continue Janumet 50/2000 twice a day, and Jardiance 10 mg daily  Increase glimepiride to 4 mg twice a day  Recommend checking blood sugar 1 to 2 times a day, and it would be helpful at alternating times  Send in blood sugar logs in 2-4 weeks  Call the office with any concerns or questions  Follow-up in 3 months with lab work completed prior to visit

## 2022-05-24 NOTE — PROGRESS NOTES
Gretchen Summers 45 y o  male MRN: [id number]    Encounter: [id number]      Assessment/Plan     Assessment: This is a 45y o -year-old male with type 2 diabetes with neuropathy, hypertension and hyperlipidemia  Plan:  1  Type 2 diabetes:  Most recent hemoglobin A1c has increased to 8 2  Was having difficulty getting his Jardiance at the beginning, it is unclear exactly how long he has been on this  Likely will need to increase the dose in the future  At this time we will continue with the Jardiance 10 mg daily, Janumet  mg twice a day  Will increase glimepiride to 4 mg twice a day  Discussed the importance of checking blood sugar 1 to 2 times a day, and asked him to send in blood sugar logs in about 2-4 weeks  Call the office with any concerns or questions  Continue with lifestyle modifications to help with glucose levels  Follow-up in 3 months with lab work completed prior to visit  2  Diabetic neuropathy:  Stable  Diabetic foot exam is up-to-date  3  Hypertension:  Slightly elevated today, but does not have his amlodipine at this time  Currently asymptomatic  Continue with current medications  Repeat CMP prior to next office visit  4  Hyperlipidemia:  Triglycerides elevated likely due to hyperglycemia  Will see if they trend downward as glucose levels improved  May have to switch simvastatin to something like pravastatin future as there is interaction with amlodipine, and also can affect blood sugars  Will continue monitor over time  CC:  Type 2 diabetes follow-up    History of Present Illness     HPI:  Gretchen Summers is a 45year old male with type 2 diabetes with neuropathy for 14 years, hypertension, hyperlipidemia for follow-up  He 1st started on metformin and eventually this was switched to [place]  He had tried Invokana in the past but it caused nausea    He is still having higher blood sugars in the morning so was concerned about his diabetes especially in light of his family history that is strong with diabetes causing amputations and early mortality  He is on oral agents at home and takes Janumet  mg twice a day and glimepiride 2 mg twice a day  He denies any polyuria, polydipsia, polyphagia, and blurry vision  He has nocturia 0 to twice a night  He has numbness and tingling of the feet unchanged  He used to have burning of his feet that is improved  He denies chest pain or shortness of breath  He denies nephropathy, retinopathy, heart attack, stroke and claudication but does admit to neuropathy  overall doing well today, but states over the last 3 months has been very stressful  Has had several injuries, and also had a good friend passed away  Has been making lifestyle modifications and has seen little improvement in glucose  Also had difficulty receiving Jardiance when he was 1st prescribed  Has been on it for 2 maybe 3 months      He did follow-up with Diabetes Education 2022        He has a strong family history of diabetes in both paternal and maternal side of the family  Father  early at 61 with amputations from diabetes and paternal aunt  in her 62s with leg amputations      Hypoglycemic episodes: No never  Will have low blood sugar symptoms in the 90's  H/o of hypoglycemia causing hospitalization or intervention such as glucagon injection  or ambulance call  No   Hypoglycemia symptoms: jitteriness and nausea  Treatment of hypoglycemia: will drink OJ or eat sugar  Glucagon:No   Medic alert tag: recommended,Yes       The patient's last eye exam was in spring 2021 with no retinopathy  The patient's last foot exam was 2022  He does not see Podiatry  Most recent hemoglobin A1c completed May 5, 2022 was 8 2      Blood Sugar/Glucometer/Pump/CGM review:  No blood sugar logs present at today's office visit, and states that he has been checking it regularly      He has hyperlipidemia and takes simvastatin 40 mg daily    He denies chest pain or shortness of breath        He has hypertension and takes amlodipine 5 mg daily and valsartan/HCTZ 320/12 5 mg daily  He denies headache or stroke-like symptoms  When he was on a higher dose of amlodipine 10 mg daily, he had orthostatics symptoms and fatigue  Review of Systems   Constitutional: Negative for activity change, appetite change, fatigue and unexpected weight change  HENT: Negative for trouble swallowing  Eyes: Negative for visual disturbance  Respiratory: Positive for apnea  Negative for chest tightness and shortness of breath  Cardiovascular: Negative for chest pain, palpitations and leg swelling  Gastrointestinal: Negative for abdominal pain, diarrhea, nausea and vomiting  Endocrine: Negative for cold intolerance, heat intolerance, polydipsia, polyphagia and polyuria  Occasional nocturia   Genitourinary: Negative for frequency  Skin: Negative for rash and wound  Neurological: Positive for numbness  Negative for dizziness, weakness, light-headedness and headaches  Psychiatric/Behavioral: Negative for dysphoric mood and sleep disturbance  The patient is not nervous/anxious          Historical Information   Past Medical History:   Diagnosis Date    Anxiety     Diabetes mellitus ([facility] [address] )     Hyperlipidemia     Hypertension     Kidney stone     Psychiatric disorder     Anxiety     Past Surgical History:   Procedure Laterality Date    EXTRACORPOREAL SHOCK WAVE LITHOTRIPSY      KNEE ARTHROSCOPY Left     torn meniscus    AL CYSTO/URETERO W/LITHOTRIPSY &INDWELL STENT INSRT Left 5/23/2017    Procedure: CYSTOSCOPY; URETEROSCOPY; HOLMIUM LASER; RETROGRADE PYELOGRAM; STENT PLACEMENT ;  Surgeon: Edgar Juarez MD;  [facility];  Service: Urology    [facility] ESWL Left 5/18/2017    Procedure: ESWL ;  Surgeon: Amber Warner MD;  [facility];  Service: Urology     Social History   Social History     Substance and Sexual Activity   Alcohol Use Yes    Comment: SOCIAL- 3-4 drinks a week with a meal     Social History     Substance and Sexual Activity   Drug Use No     Social History     Tobacco Use   Smoking Status Never Smoker   Smokeless Tobacco Never Used     Family History:   Family History   Problem Relation Age of Onset    Cancer Maternal Grandmother     Breast cancer Maternal Grandmother     Diabetes unspecified Mother         prediabetes    Thyroid disease unspecified Mother     Diabetes Father     Hypertension Father     Hyperlipidemia Father     Diabetes type II Father         toe amputation    No Known Problems Sister     Alcohol abuse Brother     Polycystic ovary syndrome Daughter         possibly, on metformin after weight gain    No Known Problems Daughter        Meds/Allergies   Current Outpatient Medications   Medication Sig Dispense Refill    ALPRAZolam (XANAX) 0 25 mg tablet TAKE 1 TABLET (0 25 MG TOTAL) BY MOUTH 3 (THREE) TIMES A DAY AS NEEDED FOR ANXIETY  30 tablet 0    amLODIPine (NORVASC) 5 mg tablet Take 1 tablet (5 mg total) by mouth in the morning  90 tablet 1    aspirin 81 MG tablet Take 81 mg by mouth daily      Blood Glucose Monitoring Suppl (ACCU-CHEK JESSY CONNECT) w/Device KIT by Does not apply route      Cholecalciferol (VITAMIN D3) 2000 UNITS TABS Take 1 tablet by mouth daily      Empagliflozin 10 MG TABS Take 1 tablet (10 mg total) by mouth every morning 30 tablet 6    gabapentin (NEURONTIN) 300 mg capsule TAKE 1 CAPSULE BY MOUTH 3 TIMES A DAY 90 capsule 5    glimepiride (AMARYL) 2 mg tablet TAKE 1 TABLET BY MOUTH TWICE A DAY WITH MEALS 60 tablet 5    Janumet  MG per tablet TAKE 2 TABLETS BY MOUTH DAILY 180 tablet 1    Omega-3 Fatty Acids (FISH OIL) 1,000 mg Take 1 capsule by mouth daily      sertraline (ZOLOFT) 100 mg tablet TAKE 2 TABLETS BY MOUTH DAILY 180 tablet 1    sildenafil (REVATIO) 20 mg tablet TAKE 3 TABLETS BY MOUTH DAILY AS NEEDED FOR ERECTILE DYSFUNCTION  90 tablet 0    simvastatin (ZOCOR) 40 mg tablet TAKE 1 TABLET BY MOUTH AT BEDTIME 90 tablet 0    valsartan-hydrochlorothiazide (DIOVAN-HCT) 320-12 5 MG per tablet TAKE 1 TABLET BY MOUTH DAILY 90 tablet 1     No current facility-administered medications for this visit  No Known Allergies    Objective   Vitals: There were no vitals taken for this visit  Physical Exam  Vitals and nursing note reviewed  Constitutional:       General: He is not in acute distress  Appearance: Normal appearance  He is not diaphoretic  HENT:      Head: Normocephalic and atraumatic  Eyes:      General: No scleral icterus  Extraocular Movements: Extraocular movements intact  Conjunctiva/sclera: Conjunctivae normal       Pupils: Pupils are equal, round, and reactive to light  Cardiovascular:      Rate and Rhythm: Normal rate and regular rhythm  Heart sounds: No murmur heard  Pulmonary:      Effort: Pulmonary effort is normal  No respiratory distress  Breath sounds: Normal breath sounds  No wheezing  Musculoskeletal:      Cervical back: Normal range of motion  Right lower leg: No edema  Left lower leg: No edema  Lymphadenopathy:      Cervical: No cervical adenopathy  Skin:     General: Skin is warm and dry  Neurological:      Mental Status: He is alert and oriented to person, place, and time  Mental status is at baseline  Sensory: No sensory deficit  Gait: Gait normal    Psychiatric:         Mood and Affect: Mood normal          Behavior: Behavior normal          Thought Content: Thought content normal          The history was obtained from the review of the chart, patient      Lab Results:   Lab Results   Component Value Date/Time    Hemoglobin A1C 8 2 (H) 05/05/2022 07:27 AM    Hemoglobin A1C 7 3 (A) 11/17/2021 03:44 PM    WBC 10 34 (H) 11/03/2021 10:26 AM    Hemoglobin 13 6 11/03/2021 10:26 AM    Hematocrit 38 7 11/03/2021 10:26 AM    MCV 89 11/03/2021 10:26 AM    Platelets 254 11/03/2021 10:26 AM    BUN 24 05/05/2022 07:27 AM    BUN 19 11/03/2021 10:26 AM    Potassium 3 7 05/05/2022 07:27 AM    Potassium 3 5 11/03/2021 10:26 AM    Chloride 99 (L) 05/05/2022 07:27 AM    Chloride 100 11/03/2021 10:26 AM    CO2 30 05/05/2022 07:27 AM    CO2 26 11/03/2021 10:26 AM    Creatinine 1 33 (H) 05/05/2022 07:27 AM    Creatinine 1 15 11/03/2021 10:26 AM    AST 29 05/05/2022 07:27 AM    AST 22 11/03/2021 10:26 AM    ALT 43 05/05/2022 07:27 AM    ALT 47 11/03/2021 10:26 AM    Albumin 4 2 05/05/2022 07:27 AM    Albumin 4 0 11/03/2021 10:26 AM    HDL, Direct 31 (L) 05/05/2022 07:27 AM    Triglycerides 432 (H) 05/05/2022 07:27 AM       Portions of the record may have been created with voice recognition software  Occasional wrong word or "sound a like" substitutions may have occurred due to the inherent limitations of voice recognition software  Read the chart carefully and recognize, using context, where substitutions have occurred

## 2022-05-26 ENCOUNTER — TELEPHONE (OUTPATIENT)
Dept: ADMINISTRATIVE | Facility: OTHER | Age: 52
End: 2022-05-26

## 2022-05-26 NOTE — TELEPHONE ENCOUNTER
----- Message from Chucky Gallegos sent at 5/26/2022 11:25 AM EDT -----  Regarding: care gap request diabetic eye exam  05/26/22 11:25 AM    Hello, our patient attached above has had Diabetic Eye Exam completed/performed  Please assist in updating the patient chart by making an External outreach to  [facility] facility located in [facility]  The date of service is November 2021      Thank you,  hCucky Gallegos  [facility]

## 2022-05-26 NOTE — TELEPHONE ENCOUNTER
Upon review of the In Basket request and the patient's chart, initial outreach has been made via fax, please see Contacts section for details       Thank you  Elo Salinas

## 2022-05-26 NOTE — LETTER
Diabetic Eye Exam Form    Date Requested: 22  Patient: Cristian Noland  Patient : 1970   Referring Provider: Flora Monroy DO    DIABETIC Eye Exam Date _______________________________    Type of Exam MUST be documented for Diabetic Eye Exams  Please CHECK ONE  Retinal Exam       Dilated Retinal Exam       OCT       Optomap-Iris Exam      Fundus Photography     Left Eye - Please check Retinopathy AND Type or No Retinopathy      Exam did show retinopathy    Exam did not show retinopathy         Mild     Proliferative           Moderate    Severe            None         Right Eye - Please check Retinopathy AND Type or No Retinopathy     Exam did show retinopathy    Exam did not show retinopathy         Mild     Proliferative        Moderate    Severe        None       Comments __________________________________________________________    Practice Providing Exam ______________________________________________    Exam Performed By (print name) _______________________________________      Provider Signature ___________________________________________________    These reports are needed for  compliance  Please fax this completed form and a copy of the Diabetic Eye Exam report to our office located at [place] [address] as soon as possible via [phone] [facility]: Phone [phone]  We thank you for your assistance in treating our mutual patient

## 2022-05-26 NOTE — LETTER
2nd Request   Diabetic Eye Exam Form    Date Requested: 22  Patient: Chante Seen  Patient : 1970   Referring Provider: Kiara Portillo DO    DIABETIC Eye Exam Date _______________________________    Type of Exam MUST be documented for Diabetic Eye Exams  Please CHECK ONE  Retinal Exam       Dilated Retinal Exam       OCT       Optomap-Iris Exam      Fundus Photography     Left Eye - Please check Retinopathy AND Type or No Retinopathy      Exam did show retinopathy    Exam did not show retinopathy         Mild     Proliferative           Moderate    Severe            None         Right Eye - Please check Retinopathy AND Type or No Retinopathy     Exam did show retinopathy    Exam did not show retinopathy         Mild     Proliferative        Moderate    Severe        None       Comments __________________________________________________________    Practice Providing Exam ______________________________________________    Exam Performed By (print name) _______________________________________      Provider Signature ___________________________________________________    These reports are needed for  compliance  Please fax this completed form and a copy of the Diabetic Eye Exam report to our office located at [place] [address] as soon as possible via [phone] heather Gonzales Danger: Phone [phone]  We thank you for your assistance in treating our mutual patient

## 2022-05-31 NOTE — TELEPHONE ENCOUNTER
As a follow-up, a second attempt has been made for outreach via fax, please see Contacts section for details       Dr Kim Mcdowell, Mamie Perez    Thank you  Ney Reed

## 2022-06-07 DIAGNOSIS — E11.69 TYPE 2 DIABETES MELLITUS WITH OTHER SPECIFIED COMPLICATION, WITHOUT LONG-TERM CURRENT USE OF INSULIN (HCC): ICD-10-CM

## 2022-06-07 DIAGNOSIS — I10 ESSENTIAL HYPERTENSION: ICD-10-CM

## 2022-06-08 RX ORDER — VALSARTAN AND HYDROCHLOROTHIAZIDE 320; 12.5 MG/1; MG/1
TABLET, FILM COATED ORAL
Qty: 90 TABLET | Refills: 1 | Status: SHIPPED | OUTPATIENT
Start: 2022-06-08

## 2022-06-08 RX ORDER — SITAGLIPTIN AND METFORMIN HYDROCHLORIDE 1000; 50 MG/1; MG/1
TABLET, FILM COATED ORAL
Qty: 180 TABLET | Refills: 1 | Status: SHIPPED | OUTPATIENT
Start: 2022-06-08

## 2022-06-08 NOTE — TELEPHONE ENCOUNTER
Upon review of the In Basket request we were able to locate, review, and update the patient chart as requested for Diabetic Eye Exam     Any additional questions or concerns should be emailed to the Practice Liaisons via [email or web address] email, please do not reply via In Basket      Thank you  Jonathan Chau

## 2022-06-24 DIAGNOSIS — E78.5 HYPERLIPIDEMIA, UNSPECIFIED HYPERLIPIDEMIA TYPE: ICD-10-CM

## 2022-06-24 RX ORDER — SIMVASTATIN 40 MG
TABLET ORAL
Qty: 90 TABLET | Refills: 0 | Status: SHIPPED | OUTPATIENT
Start: 2022-06-24

## 2022-06-29 ENCOUNTER — VBI (OUTPATIENT)
Dept: ADMINISTRATIVE | Facility: OTHER | Age: 52
End: 2022-06-29

## 2022-07-13 DIAGNOSIS — F32.9 REACTIVE DEPRESSION: ICD-10-CM

## 2022-07-13 RX ORDER — SERTRALINE HYDROCHLORIDE 100 MG/1
TABLET, FILM COATED ORAL
Qty: 180 TABLET | Refills: 1 | Status: SHIPPED | OUTPATIENT
Start: 2022-07-13

## 2022-07-15 ENCOUNTER — VBI (OUTPATIENT)
Dept: ADMINISTRATIVE | Facility: OTHER | Age: 52
End: 2022-07-15

## 2022-07-18 ENCOUNTER — VBI (OUTPATIENT)
Dept: ADMINISTRATIVE | Facility: OTHER | Age: 52
End: 2022-07-18

## 2022-07-21 ENCOUNTER — HOSPITAL ENCOUNTER (OUTPATIENT)
Dept: SLEEP CENTER | Facility: CLINIC | Age: 52
Discharge: HOME | End: 2022-07-21
Payer: COMMERCIAL

## 2022-07-21 DIAGNOSIS — G47.33 OSA (OBSTRUCTIVE SLEEP APNEA): ICD-10-CM

## 2022-07-21 PROCEDURE — 95810 POLYSOM 6/> YRS 4/> PARAM: CPT

## 2022-07-22 DIAGNOSIS — G47.33 OSA (OBSTRUCTIVE SLEEP APNEA): Primary | ICD-10-CM

## 2022-07-22 NOTE — PROGRESS NOTES
Sleep Study Documentation    Pre-Sleep Study       Sleep testing procedure explained to patient:YES    Patient napped prior to study:NO    Caffeine:Dayshift worker after 12PM   Caffeine use:NO    Alcohol:Dayshift workers after 5PM: Alcohol use:NO    Typical day for patient:YES       Study Documentation    Sleep Study Indications: unrefreshing sleep, RLS, HTN    Sleep Study: Diagnostic   Snore : moderate  Supplemental O2: no    Minimum SaO2 80  Baseline SaO2 92          EKG abnormalities: PVCs and tachycardia     EEG abnormalities: no    Sleep Study Recorded < 2 hours: N/A    Sleep Study Recorded > 2 hours but incomplete study: N/A    Sleep Study Recorded 6 hours but no sleep obtained: NO    Patient classification: employed       Post-Sleep Study    Medication used at bedtime or during sleep study:NO    Patient reports time it took to fall asleep:greater than 60 minutes    Patient reports waking up during study:1 to 2 times  Patient reports returning to sleep in 10 to 30 minutes  Patient reports sleeping 2 to 4 hours without dreaming  Patient reports sleep during study:typical    Patient rated sleepiness: Somewhat sleepy or tired    PAP treatment:no

## 2022-07-25 ENCOUNTER — TELEPHONE (OUTPATIENT)
Dept: SLEEP CENTER | Facility: CLINIC | Age: 52
End: 2022-07-25

## 2022-07-25 NOTE — TELEPHONE ENCOUNTER
Sleep study shows moderate TRACEY and Dr Martha Merrill ordered CPAP study advised patient and scheduled CPAP study

## 2022-08-19 ENCOUNTER — VBI (OUTPATIENT)
Dept: ADMINISTRATIVE | Facility: OTHER | Age: 52
End: 2022-08-19

## 2022-10-03 DIAGNOSIS — E78.5 HYPERLIPIDEMIA, UNSPECIFIED HYPERLIPIDEMIA TYPE: ICD-10-CM

## 2022-10-03 DIAGNOSIS — F41.9 ANXIETY: ICD-10-CM

## 2022-10-04 ENCOUNTER — VBI (OUTPATIENT)
Dept: ADMINISTRATIVE | Facility: OTHER | Age: 52
End: 2022-10-04

## 2022-10-04 RX ORDER — ALPRAZOLAM 0.25 MG/1
0.25 TABLET ORAL 3 TIMES DAILY PRN
Qty: 30 TABLET | Refills: 0 | Status: SHIPPED | OUTPATIENT
Start: 2022-10-04

## 2022-10-04 RX ORDER — SIMVASTATIN 40 MG
40 TABLET ORAL
Qty: 90 TABLET | Refills: 0 | Status: SHIPPED | OUTPATIENT
Start: 2022-10-04

## 2022-10-04 NOTE — TELEPHONE ENCOUNTER
Failed Protocol, Last OV 05/17/2022     Psychiatry:  Anxiolytics/Hypnotics Failed    This refill cannot be delegated    Valid encounter within last 6 months

## 2022-10-19 ENCOUNTER — APPOINTMENT (OUTPATIENT)
Dept: LAB | Age: 52
End: 2022-10-19
Payer: COMMERCIAL

## 2022-10-19 DIAGNOSIS — Z11.4 SCREENING FOR HIV (HUMAN IMMUNODEFICIENCY VIRUS): ICD-10-CM

## 2022-10-19 DIAGNOSIS — E11.65 TYPE 2 DIABETES MELLITUS WITH HYPERGLYCEMIA, WITHOUT LONG-TERM CURRENT USE OF INSULIN (HCC): ICD-10-CM

## 2022-10-19 DIAGNOSIS — Z11.59 NEED FOR HEPATITIS C SCREENING TEST: ICD-10-CM

## 2022-10-19 LAB
ALBUMIN SERPL BCP-MCNC: 4.1 G/DL (ref 3.5–5)
ALP SERPL-CCNC: 73 U/L (ref 46–116)
ALT SERPL W P-5'-P-CCNC: 53 U/L (ref 12–78)
ANION GAP SERPL CALCULATED.3IONS-SCNC: 6 MMOL/L (ref 4–13)
AST SERPL W P-5'-P-CCNC: 33 U/L (ref 5–45)
BILIRUB SERPL-MCNC: 1.24 MG/DL (ref 0.2–1)
BUN SERPL-MCNC: 26 MG/DL (ref 5–25)
CALCIUM SERPL-MCNC: 9.4 MG/DL (ref 8.3–10.1)
CHLORIDE SERPL-SCNC: 102 MMOL/L (ref 96–108)
CO2 SERPL-SCNC: 28 MMOL/L (ref 21–32)
CREAT SERPL-MCNC: 1.4 MG/DL (ref 0.6–1.3)
EST. AVERAGE GLUCOSE BLD GHB EST-MCNC: 151 MG/DL
GFR SERPL CREATININE-BSD FRML MDRD: 56 ML/MIN/1.73SQ M
GLUCOSE P FAST SERPL-MCNC: 161 MG/DL (ref 65–99)
HBA1C MFR BLD: 6.9 %
HCV AB SER QL: NORMAL
POTASSIUM SERPL-SCNC: 3.5 MMOL/L (ref 3.5–5.3)
PROT SERPL-MCNC: 8.1 G/DL (ref 6.4–8.4)
SODIUM SERPL-SCNC: 136 MMOL/L (ref 135–147)

## 2022-10-19 PROCEDURE — 80053 COMPREHEN METABOLIC PANEL: CPT

## 2022-10-19 PROCEDURE — 36415 COLL VENOUS BLD VENIPUNCTURE: CPT

## 2022-10-19 PROCEDURE — 86803 HEPATITIS C AB TEST: CPT

## 2022-10-19 PROCEDURE — 83036 HEMOGLOBIN GLYCOSYLATED A1C: CPT

## 2022-10-19 PROCEDURE — 87389 HIV-1 AG W/HIV-1&-2 AB AG IA: CPT

## 2022-10-20 LAB — HIV 1+2 AB+HIV1 P24 AG SERPL QL IA: NORMAL

## 2022-10-26 ENCOUNTER — OFFICE VISIT (OUTPATIENT)
Dept: ENDOCRINOLOGY | Facility: HOSPITAL | Age: 52
End: 2022-10-26
Payer: COMMERCIAL

## 2022-10-26 VITALS
WEIGHT: 250 LBS | SYSTOLIC BLOOD PRESSURE: 130 MMHG | DIASTOLIC BLOOD PRESSURE: 90 MMHG | HEIGHT: 71 IN | HEART RATE: 97 BPM | BODY MASS INDEX: 35 KG/M2

## 2022-10-26 DIAGNOSIS — E11.65 TYPE 2 DIABETES MELLITUS WITH HYPERGLYCEMIA, WITHOUT LONG-TERM CURRENT USE OF INSULIN (HCC): Primary | ICD-10-CM

## 2022-10-26 PROCEDURE — 99214 OFFICE O/P EST MOD 30 MIN: CPT | Performed by: PHYSICIAN ASSISTANT

## 2022-10-26 NOTE — PROGRESS NOTES
Jim Franklin 45 y o  male MRN: [id number]    Encounter: [id number]      Assessment/Plan     Assessment: This is a 45y o -year-old male with type 2 diabetes with neuropathy, hypertension and hyperlipidemia  Plan:  1  Type 2 diabetes:  Most recent hemoglobin A1c was 6 9  At this time we will continue with the Jardiance 10 mg daily, Janumet  mg twice a day, glimepiride 4 mg twice a day  Discussed the importance of checking blood sugar 1 to 2 times a day, and asked him to send in blood sugar logs in about 2-4 weeks  Call the office with any concerns or questions  Strongly encouraged lifestyle modifications to help improve glucose levels and maintain an A1c within goal range  If not will have to make adjustments to medication in the future  Follow-up in 3 months with lab work completed prior to visit      2  Diabetic neuropathy:  Stable  Diabetic foot exam is up-to-date      3  Hypertension:  Normotensive in the office today  Kidney function is relatively stable, but does a appear to have some mild dehydration  We will monitor at this time  Continue with current medications  Repeat CMP prior to next office visit      4  Hyperlipidemia:  Triglycerides elevated likely due to hyperglycemia  Will see if they trend downward as glucose levels improved  May have to switch simvastatin to something like pravastatin future as there is interaction with amlodipine, and also can affect blood sugars  Will continue monitor over time       CC:  Type 2 diabetes follow-up    History of Present Illness     HPI:  Sheridan LEVI 50 BKRI old male with type 2 diabetes with neuropathy for 14 years, hypertension, hyperlipidemia for follow-up   He 1st started on metformin and eventually this was switched to [address] had tried Invokana in the past but it caused nausea  Cindytaylor Hussein is still having higher blood sugars in the morning so was concerned about his diabetes especially in light of his family history that is strong with diabetes causing amputations and early mortality   He is on oral agents at home and takes Janumet  mg twice a day, Jardiance 10 mg daily, and glimepiride 4 mg twice a day  He denies any polyuria, polydipsia, polyphagia, and blurry vision   He has nocturia 0 to twice a night   He has numbness and tingling of the feet unchanged  He denies chest pain or shortness of breath   He denies nephropathy, retinopathy, heart attack, stroke and claudication but does admit to neuropathy  Overall doing well today without any concerns or questions  Has made significant lifestyle changes to help improve glucose levels  Is mainly drinking water at this time and is limiting carbohydrates      He did follow-up with [facility] 2022        He has a strong family history of diabetes in both paternal and maternal side of the family  Jossy Negron  early at 61 with amputations from diabetes and paternal aunt  in her 62s with leg amputations      Hypoglycemic episodes: No never  Will have low blood sugar symptoms in the 90's  H/o of hypoglycemia causing hospitalization or intervention such as glucagon injection  or ambulance call  No   Hypoglycemia symptoms: jitteriness and nausea  Treatment of hypoglycemia: will drink OJ or eat sugar  Glucagon:No   Medic alert tag: recommended,Yes       The patient's last eye exam was in spring 2021 with no retinopathy   The patient's last foot exam was 2022  [facility] does not see Podiatry  Most recent hemoglobin A1c completed 2022 was 6 9      Blood Sugar/Glucometer/Pump/CGM review:  Has not necessarily been checking blood sugars on a routine basis  States morning blood sugars are typically higher  Is averaging around 150  Has been as low as the 90s which he states sometimes will have hypoglycemic symptoms  Highest blood sugars are in the low 200s         He has hyperlipidemia and takes simvastatin 40 mg daily  [facility] denies chest pain or shortness of breath        He has hypertension and takes amlodipine 5 mg daily and valsartan/HCTZ 320/12 5 mg daily  Sofi Gao denies headache or stroke-like symptoms   When he was on a higher dose of amlodipine 10 mg daily, he had orthostatics symptoms and fatigue  Review of Systems   Constitutional: Negative for activity change, appetite change, fatigue and unexpected weight change  HENT: Negative for trouble swallowing  Eyes: Negative for visual disturbance  Respiratory: Positive for apnea  Negative for chest tightness and shortness of breath  Cardiovascular: Negative for chest pain, palpitations and leg swelling  Gastrointestinal: Negative for abdominal pain, diarrhea, nausea and vomiting  Endocrine: Negative for cold intolerance, heat intolerance, polydipsia, polyphagia and polyuria  Occasional nocturia   Genitourinary: Negative for frequency  Skin: Negative for rash and wound  Neurological: Positive for numbness  Negative for dizziness, weakness, light-headedness and headaches  Psychiatric/Behavioral: Negative for dysphoric mood and sleep disturbance  The patient is not nervous/anxious          Historical Information   Past Medical History:   Diagnosis Date   • Anxiety    • Diabetes mellitus ([facility] [address] )    • Hyperlipidemia    • Hypertension    • Kidney stone    • Psychiatric disorder     Anxiety     Past Surgical History:   Procedure Laterality Date   • EXTRACORPOREAL SHOCK WAVE LITHOTRIPSY     • KNEE ARTHROSCOPY Left     torn meniscus   • GA CYSTO/URETERO W/LITHOTRIPSY &INDWELL STENT INSRT Left 5/23/2017    Procedure: CYSTOSCOPY; URETEROSCOPY; HOLMIUM LASER; RETROGRADE PYELOGRAM; STENT PLACEMENT ;  Surgeon: Marjorie Mccabe MD;  Location: [facility];  Service: Urology   • GA FRAGMENT KIDNEY STONE/ ESWL Left 5/18/2017    Procedure: ESWL ;  Surgeon: Marie Davis MD;  Location: [facility];  Service: Urology     Social History   Social History     Substance and Sexual Activity   Alcohol Use Yes   • Alcohol/week: 5 0 standard drinks   • Types: 5 Standard drinks or equivalent per week    Comment: SOCIAL- 3-4 drinks a week with a meal     Social History     Substance and Sexual Activity   Drug Use No     Social History     Tobacco Use   Smoking Status Never Smoker   Smokeless Tobacco Never Used     Family History:   Family History   Problem Relation Age of Onset   • Cancer Maternal Grandmother    • Breast cancer Maternal Grandmother    • Diabetes unspecified Mother         prediabetes   • Thyroid disease unspecified Mother    • Diabetes Father    • Hypertension Father    • Hyperlipidemia Father    • Diabetes type II Father         toe amputation   • No Known Problems Sister    • Alcohol abuse Brother    • Polycystic ovary syndrome Daughter         possibly, on metformin after weight gain   • No Known Problems Daughter        Meds/Allergies   Current Outpatient Medications   Medication Sig Dispense Refill   • ALPRAZolam (XANAX) 0 25 mg tablet Take 1 tablet (0 25 mg total) by mouth 3 (three) times a day as needed for anxiety 30 tablet 0   • amLODIPine (NORVASC) 5 mg tablet Take 1 tablet (5 mg total) by mouth in the morning  90 tablet 1   • aspirin 81 MG tablet Take 81 mg by mouth daily     • Blood Glucose Monitoring Suppl (ACCU-CHEK Guidance Software CONNECT) w/Device KIT by Does not apply route     • Cholecalciferol (VITAMIN D3) 2000 UNITS TABS Take 1 tablet by mouth daily     • Empagliflozin 10 MG TABS Take 1 tablet (10 mg total) by mouth every morning 30 tablet 6   • gabapentin (NEURONTIN) 300 mg capsule TAKE 1 CAPSULE BY MOUTH 3 TIMES A DAY 90 capsule 5   • glimepiride (AMARYL) 4 mg tablet Take 1 tablet (4 mg total) by mouth in the morning and 1 tablet (4 mg total) in the evening  Take with meals   180 tablet 1   • Janumet  MG per tablet TAKE 2 TABLETS BY MOUTH EVERY  tablet 1   • Omega-3 Fatty Acids (FISH OIL) 1,000 mg Take 1 capsule by mouth daily     • sertraline (ZOLOFT) 100 mg tablet TAKE 2 TABLETS BY MOUTH DAILY 180 tablet 1   • simvastatin (ZOCOR) 40 mg tablet Take 1 tablet (40 mg total) by mouth daily at bedtime 90 tablet 0   • valsartan-hydrochlorothiazide (DIOVAN-HCT) 320-12 5 MG per tablet TAKE 1 TABLET BY MOUTH EVERY DAY 90 tablet 1   • sildenafil (REVATIO) 20 mg tablet TAKE 3 TABLETS BY MOUTH DAILY AS NEEDED FOR ERECTILE DYSFUNCTION  90 tablet 0     No current facility-administered medications for this visit  No Known Allergies    Objective   Vitals: Blood pressure 130/90, pulse 97, height 5' 11" (1 803 m), weight 113 kg (250 lb)  Physical Exam  Vitals and nursing note reviewed  Constitutional:       General: He is not in acute distress  Appearance: Normal appearance  He is not diaphoretic  HENT:      Head: Normocephalic and atraumatic  Eyes:      General: No scleral icterus  Extraocular Movements: Extraocular movements intact  Conjunctiva/sclera: Conjunctivae normal       Pupils: Pupils are equal, round, and reactive to light  Cardiovascular:      Rate and Rhythm: Normal rate and regular rhythm  Heart sounds: No murmur heard  Pulmonary:      Effort: Pulmonary effort is normal  No respiratory distress  Breath sounds: Normal breath sounds  No wheezing  Musculoskeletal:      Right lower leg: No edema  Left lower leg: No edema  Lymphadenopathy:      Cervical: No cervical adenopathy  Skin:     General: Skin is warm and dry  Neurological:      Mental Status: He is alert and oriented to person, place, and time  Mental status is at baseline  Sensory: No sensory deficit  Psychiatric:         Mood and Affect: Mood normal          Behavior: Behavior normal          Thought Content: Thought content normal          The history was obtained from the review of the chart, patient      Lab Results:   Lab Results   Component Value Date/Time    Hemoglobin A1C 6 9 (H) 10/19/2022 07:07 AM    Hemoglobin A1C 8 2 (H) 05/05/2022 07:27 AM    Hemoglobin A1C 7 3 (A) 11/17/2021 03:44 PM    WBC 10 34 (H) 11/03/2021 10:26 AM    Hemoglobin 13 6 11/03/2021 10:26 AM    Hematocrit 38 7 11/03/2021 10:26 AM    MCV 89 11/03/2021 10:26 AM    Platelets 613 84/98/6505 10:26 AM    BUN 26 (H) 10/19/2022 07:07 AM    BUN 24 05/05/2022 07:27 AM    BUN 19 11/03/2021 10:26 AM    Potassium 3 5 10/19/2022 07:07 AM    Potassium 3 7 05/05/2022 07:27 AM    Potassium 3 5 11/03/2021 10:26 AM    Chloride 102 10/19/2022 07:07 AM    Chloride 99 (L) 05/05/2022 07:27 AM    Chloride 100 11/03/2021 10:26 AM    CO2 28 10/19/2022 07:07 AM    CO2 30 05/05/2022 07:27 AM    CO2 26 11/03/2021 10:26 AM    Creatinine 1 40 (H) 10/19/2022 07:07 AM    Creatinine 1 33 (H) 05/05/2022 07:27 AM    Creatinine 1 15 11/03/2021 10:26 AM    AST 33 10/19/2022 07:07 AM    AST 29 05/05/2022 07:27 AM    AST 22 11/03/2021 10:26 AM    ALT 53 10/19/2022 07:07 AM    ALT 43 05/05/2022 07:27 AM    ALT 47 11/03/2021 10:26 AM    Albumin 4 1 10/19/2022 07:07 AM    Albumin 4 2 05/05/2022 07:27 AM    Albumin 4 0 11/03/2021 10:26 AM    HDL, Direct 31 (L) 05/05/2022 07:27 AM    Triglycerides 432 (H) 05/05/2022 07:27 AM       Portions of the record may have been created with voice recognition software  Occasional wrong word or "sound a like" substitutions may have occurred due to the inherent limitations of voice recognition software  Read the chart carefully and recognize, using context, where substitutions have occurred

## 2022-10-26 NOTE — PATIENT INSTRUCTIONS
Monitor diet and maintain physical activity  Continue Janumet 50/2000 twice a day, glimepiride 4 mg twice a day, and Jardiance 10 mg daily  Recommend checking blood sugar 1 to 2 times a day, and it would be helpful at alternating times  Send in blood sugar logs in 2-4 weeks  Call the office with any concerns or questions  Follow-up in 3 months with lab work completed prior to visit

## 2022-10-28 ENCOUNTER — TELEMEDICINE (OUTPATIENT)
Dept: FAMILY MEDICINE CLINIC | Facility: CLINIC | Age: 52
End: 2022-10-28

## 2022-10-28 DIAGNOSIS — U07.1 COVID-19: Primary | ICD-10-CM

## 2022-10-28 RX ORDER — NIRMATRELVIR AND RITONAVIR 150-100 MG
2 KIT ORAL 2 TIMES DAILY
Qty: 20 TABLET | Refills: 0 | Status: SHIPPED | OUTPATIENT
Start: 2022-10-28 | End: 2022-11-02

## 2022-10-28 NOTE — PROGRESS NOTES
COVID-19 Outpatient Progress Note    Assessment/Plan:    Problem List Items Addressed This Visit        Other    COVID-19 - Primary     - Discussed treatment options and patient is agreeable to starting Paxlovid  Reviewed side effects; patient also advised to refrain from taking Simvastatin whilst on Paxlovid  Emergency department precautions reviewed  Relevant Medications    nirmatrelvir & ritonavir (Paxlovid, 150/100,) tablet therapy pack         Disposition:     Patient has asymptomatic or mild COVID-19 infection  Based off CDC guidelines, they were recommended to isolate for 5 days  If they are asymptomatic or symptoms are improving with no fevers in the past 24 hours, isolation may be ended followed by 5 days of wearing a mask when around othes to minimize risk of infecting others  If still have a fever or other symptoms have not improved, continue to isolate until they improve  Regardless of when they end isolation, avoid being around people who are more likely to get very sick from COVID-19 until at least day 11  Discussed symptom directed medication options with patient  Patient meets criteria for PAXLOVID and they have been counseled appropriately according to EUA documentation released by the FDA  After discussion, patient agrees to treatment  Bassam Contreras is an investigational medicine used to treat mild-to-moderate COVID-19 in adults and children (15years of age and older weighing at least 80 pounds (40 kg)) with positive results of direct SARS-CoV-2 viral testing, and who are at high risk for progression to severe COVID-19, including hospitalization or death  PAXLOVID is investigational because it is still being studied  There is limited information about the safety and effectiveness of using PAXLOVID to treat people with mild-to-moderate COVID-19      The FDA has authorized the emergency use of PAXLOVID for the treatment of mild-tomoderate COVID-19 in adults and children (12 years of age and older weighing at least 80 pounds (40 kg)) with a positive test for the virus that causes COVID-19, and who are at high risk for progression to severe COVID-19, including hospitalization or death, under an EUA  What should I tell my healthcare provider before I take PAXLOVID? Tell your healthcare provider if you:  - Have any allergies  - Have liver or kidney disease  - Are pregnant or plan to become pregnant  - Are breastfeeding a child  - Have any serious illnesses    Tell your healthcare provider about all the medicines you take, including prescription and over-the-counter medicines, vitamins, and herbal supplements  Some medicines may interact with PAXLOVID and may cause serious side effects  Keep a list of your medicines to show your healthcare provider and pharmacist when you get a new medicine  You can ask your healthcare provider or pharmacist for a list of medicines that interact with PAXLOVID  Do not start taking a new medicine without telling your healthcare provider  Your healthcare provider can tell you if it is safe to take PAXLOVID with other medicines  Tell your healthcare provider if you are taking combined hormonal contraceptive  PAXLOVID may affect how your birth control pills work  Females who are able to become pregnant should use another effective alternative form of contraception or an additional barrier method of contraception  Talk to your healthcare provider if you have any questions about contraceptive methods that might be right for you  How do I take PAXLOVID? PAXLOVID consists of 2 medicines: nirmatrelvir and ritonavir  - Take 2 pink tablets of nirmatrelvir with 1 white tablet of ritonavir by mouth 2 times each day (in the morning and in the evening) for 5 days  For each dose, take all 3 tablets at the same time  - If you have kidney disease, talk to your healthcare provider  You may need a different dose  - Swallow the tablets whole   Do not chew, break, or crush the tablets  - Take PAXLOVID with or without food  - Do not stop taking PAXLOVID without talking to your healthcare provider, even if you feel better  - If you miss a dose of PAXLOVID within 8 hours of the time it is usually taken, take it as soon as you remember  If you miss a dose by more than 8 hours, skip the missed dose and take the next dose at your regular time  Do not take 2 doses of PAXLOVID at the same time  - If you take too much PAXLOVID, call your healthcare provider or go to the nearest hospital emergency room right away  - If you are taking a ritonavir- or cobicistat-containing medicine to treat hepatitis C or Human Immunodeficiency Virus (HIV), you should continue to take your medicine as prescribed by your healthcare provider   - Talk to your healthcare provider if you do not feel better or if you feel worse after 5 days  Who should generally not take PAXLOVID? Do not take PAXLOVID if:  You are allergic to nirmatrelvir, ritonavir, or any of the ingredients in PAXLOVID  You are taking any of the following medicines:  - Alfuzosin  - Pethidine, piroxicam, propoxyphene  - Ranolazine  - Amiodarone, dronedarone, flecainide, propafenone, quinidine  - Colchicine  - Lurasidone, pimozide, clozapine  - Dihydroergotamine, ergotamine, methylergonovine  - Lovastatin, simvastatin  - Sildenafil (Revatio®) for pulmonary arterial hypertension (PAH)  - Triazolam, oral midazolam  - Apalutamide  - Carbamazepine, phenobarbital, phenytoin  - Rifampin  - St  Ronen’s Wort (hypericum perforatum)    What are the important possible side effects of PAXLOVID? Possible side effects of PAXLOVID are:  - Liver Problems  Tell your healthcare provider right away if you have any of these signs and symptoms of liver problems: loss of appetite, yellowing of your skin and the whites of eyes (jaundice), dark-colored urine, pale colored stools and itchy skin, stomach area (abdominal) pain    - Resistance to HIV Medicines  If you have untreated HIV infection, PAXLOVID may lead to some HIV medicines not working as well in the future  - Other possible side effects include: altered sense of taste, diarrhea, high blood pressure, or muscle aches    These are not all the possible side effects of PAXLOVID  Not many people have taken PAXLOVID  Serious and unexpected side effects may happen  Merry Perez is still being studied, so it is possible that all of the risks are not known at this time  What other treatment choices are there? Like Cris Garcia may allow for the emergency use of other medicines to treat people with COVID-19  Go to https://[employer]/ for information on the emergency use of other medicines that are authorized by FDA to treat people with COVID-19  Your healthcare provider may talk with you about clinical trials for which you may be eligible  It is your choice to be treated or not to be treated with PAXLOVID  Should you decide not to receive it or for your child not to receive it, it will not change your standard medical care  What if I am pregnant or breastfeeding? There is no experience treating pregnant women or breastfeeding mothers with PAXLOVID  For a mother and unborn baby, the benefit of taking PAXLOVID may be greater than the risk from the treatment  If you are pregnant, discuss your options and specific situation with your healthcare provider  It is recommended that you use effective barrier contraception or do not have sexual activity while taking PAXLOVID  If you are breastfeeding, discuss your options and specific situation with your healthcare provider  How do I report side effects with PAXLOVID? Contact your healthcare provider if you have any side effects that bother you or do not go away      Report side effects to FDA MedWatch at www fda gov/medwatch or call 1-163-ICH1680 or you can report side effects to [facility]  at the contact information provided below  Website Fax number Telephone number   [employer] [phone]     How should I store Ephriam Madiha? Store PAXLOVID tablets at room temperature between 68°F to 77°F (20°C to 25°C)  Full fact sheet for patients, parents, and caregivers can be found at: [employer]    I have spent 10 minutes directly with the patient  Greater than 50% of this time was spent in counseling/coordination of care regarding: risks and benefits of treatment options, instructions for management and patient and family education  Encounter provider: Angelina Ramos MD     Provider located at: [address] [place] [address] [id number] [phone]     Recent Visits  No visits were found meeting these conditions  Showing recent visits within past 7 days and meeting all other requirements  Today's Visits  Date Type Provider Dept   10/28/22 Telemedicine Angelina Ramos MD [address] today's visits and meeting all other requirements  Future Appointments  No visits were found meeting these conditions  Showing future appointments within next 150 days and meeting all other requirements     This virtual check-in was done via [employer] and patient was informed that this is a secure, HIPAA-compliant platform  He agrees to proceed  Patient agrees to participate in a virtual check in via telephone or video visit instead of presenting to the office to address urgent/immediate medical needs  Patient is aware this is a billable service  He acknowledged consent and understanding of privacy and security of the video platform  The patient has agreed to participate and understands they can discontinue the visit at any time  After connecting through [facility], the patient was identified by name and date of birth   Durán Juan M was informed that this was a telemedicine visit and that the exam was being conducted confidentially over secure lines  My office door was closed  No one else was in the room  Adán Castaneda acknowledged consent and understanding of privacy and security of the telemedicine visit  I informed the patient that I have reviewed his record in Epic and presented the opportunity for him to ask any questions regarding the visit today  The patient agreed to participate  Verification of patient location:  Patient is located in the following state in which I hold an active license: [place]    Subjective:   Adán Castaneda is a 45 y o  male who has been screened for COVID-19  Patient's symptoms include nasal congestion and rhinorrhea  Patient denies fever, chills and shortness of breath  - Date of symptom onset: 10/25/2022  - Date of positive COVID-19 test: 10/28/2022  Type of test: Home antigen  Patient with typical symptoms of COVID-19 and they attest that they were positive on home rapid antigen testing  Image of positive result is not able to be uploaded into their chart  COVID-19 vaccination status: Fully vaccinated with booster    No results found for: [address] [facility], [facility], [facility] [facility] [facility] [address] 116, 350 [facility], 700 [facility]    Review of Systems   Constitutional: Negative for chills and fever  HENT: Positive for congestion, rhinorrhea and sneezing  Eyes: Negative  Respiratory: Negative for shortness of breath and wheezing  Cardiovascular: Negative for chest pain  Gastrointestinal: Negative  Musculoskeletal: Negative  Neurological: Negative  Psychiatric/Behavioral: Negative  Current Outpatient Medications on File Prior to Visit   Medication Sig   • ALPRAZolam (XANAX) 0 25 mg tablet Take 1 tablet (0 25 mg total) by mouth 3 (three) times a day as needed for anxiety   • amLODIPine (NORVASC) 5 mg tablet Take 1 tablet (5 mg total) by mouth in the morning     • aspirin 81 MG tablet Take 81 mg by mouth daily   • Blood Glucose Monitoring Suppl (ACCU-CHEK JESSY CONNECT) w/Device KIT by Does not apply route   • Cholecalciferol (VITAMIN D3) 2000 UNITS TABS Take 1 tablet by mouth daily   • Empagliflozin 10 MG TABS Take 1 tablet (10 mg total) by mouth every morning   • gabapentin (NEURONTIN) 300 mg capsule TAKE 1 CAPSULE BY MOUTH 3 TIMES A DAY   • glimepiride (AMARYL) 4 mg tablet Take 1 tablet (4 mg total) by mouth in the morning and 1 tablet (4 mg total) in the evening  Take with meals  • Janumet  MG per tablet TAKE 2 TABLETS BY MOUTH EVERY DAY   • Omega-3 Fatty Acids (FISH OIL) 1,000 mg Take 1 capsule by mouth daily   • sertraline (ZOLOFT) 100 mg tablet TAKE 2 TABLETS BY MOUTH DAILY   • sildenafil (REVATIO) 20 mg tablet TAKE 3 TABLETS BY MOUTH DAILY AS NEEDED FOR ERECTILE DYSFUNCTION  • simvastatin (ZOCOR) 40 mg tablet Take 1 tablet (40 mg total) by mouth daily at bedtime   • valsartan-hydrochlorothiazide (DIOVAN-HCT) 320-12 5 MG per tablet TAKE 1 TABLET BY MOUTH EVERY DAY       Objective: There were no vitals taken for this visit  Physical Exam  Constitutional:       General: He is not in acute distress  Appearance: He is not ill-appearing  HENT:      Head: Normocephalic and atraumatic  Eyes:      General:         Right eye: No discharge  Left eye: No discharge  Extraocular Movements: Extraocular movements intact  Pulmonary:      Effort: No respiratory distress  Neurological:      General: No focal deficit present  Mental Status: He is alert     Psychiatric:         Mood and Affect: Mood normal          Behavior: Behavior normal        Roxie Anne MD

## 2022-10-28 NOTE — ASSESSMENT & PLAN NOTE
- Discussed treatment options and patient is agreeable to starting Paxlovid  Reviewed side effects; patient also advised to refrain from taking Simvastatin whilst on Paxlovid  Emergency department precautions reviewed

## 2022-11-01 ENCOUNTER — VBI (OUTPATIENT)
Dept: ADMINISTRATIVE | Facility: OTHER | Age: 52
End: 2022-11-01

## 2022-11-02 ENCOUNTER — VBI (OUTPATIENT)
Dept: ADMINISTRATIVE | Facility: OTHER | Age: 52
End: 2022-11-02

## 2022-11-28 ENCOUNTER — RA CDI HCC (OUTPATIENT)
Dept: OTHER | Facility: HOSPITAL | Age: 52
End: 2022-11-28

## 2022-11-28 NOTE — PROGRESS NOTES
[facility]  coding opportunities          Chart Reviewed number of suggestions sent to Provider: 2   e11 22  E11 29    Patients Insurance        Commercial Insurance: [address] [place] [address]

## 2022-12-22 ENCOUNTER — OFFICE VISIT (OUTPATIENT)
Dept: FAMILY MEDICINE CLINIC | Facility: CLINIC | Age: 52
End: 2022-12-22

## 2022-12-22 VITALS
HEIGHT: 71 IN | RESPIRATION RATE: 16 BRPM | SYSTOLIC BLOOD PRESSURE: 134 MMHG | HEART RATE: 92 BPM | OXYGEN SATURATION: 96 % | BODY MASS INDEX: 35 KG/M2 | TEMPERATURE: 97.8 F | WEIGHT: 250 LBS | DIASTOLIC BLOOD PRESSURE: 76 MMHG

## 2022-12-22 DIAGNOSIS — I10 ESSENTIAL HYPERTENSION: ICD-10-CM

## 2022-12-22 DIAGNOSIS — I10 PRIMARY HYPERTENSION: ICD-10-CM

## 2022-12-22 DIAGNOSIS — E11.65 TYPE 2 DIABETES MELLITUS WITH HYPERGLYCEMIA, WITHOUT LONG-TERM CURRENT USE OF INSULIN (HCC): Primary | ICD-10-CM

## 2022-12-22 DIAGNOSIS — E55.9 VITAMIN D DEFICIENCY: ICD-10-CM

## 2022-12-22 DIAGNOSIS — E66.9 CLASS 1 OBESITY WITH SERIOUS COMORBIDITY AND BODY MASS INDEX (BMI) OF 34.0 TO 34.9 IN ADULT, UNSPECIFIED OBESITY TYPE: ICD-10-CM

## 2022-12-22 DIAGNOSIS — Z23 FLU VACCINE NEED: ICD-10-CM

## 2022-12-22 DIAGNOSIS — E11.69 TYPE 2 DIABETES MELLITUS WITH OTHER SPECIFIED COMPLICATION, WITHOUT LONG-TERM CURRENT USE OF INSULIN (HCC): ICD-10-CM

## 2022-12-22 DIAGNOSIS — E78.5 HYPERLIPIDEMIA, UNSPECIFIED HYPERLIPIDEMIA TYPE: ICD-10-CM

## 2022-12-22 DIAGNOSIS — F33.9 DEPRESSION, RECURRENT (HCC): ICD-10-CM

## 2022-12-22 RX ORDER — VALSARTAN AND HYDROCHLOROTHIAZIDE 320; 12.5 MG/1; MG/1
TABLET, FILM COATED ORAL
Qty: 90 TABLET | Refills: 1 | Status: SHIPPED | OUTPATIENT
Start: 2022-12-22

## 2022-12-22 RX ORDER — SITAGLIPTIN AND METFORMIN HYDROCHLORIDE 1000; 50 MG/1; MG/1
TABLET, FILM COATED ORAL
Qty: 180 TABLET | Refills: 1 | Status: SHIPPED | OUTPATIENT
Start: 2022-12-22

## 2022-12-22 RX ORDER — AMLODIPINE BESYLATE 5 MG/1
5 TABLET ORAL DAILY
Qty: 90 TABLET | Refills: 1 | Status: SHIPPED | OUTPATIENT
Start: 2022-12-22

## 2022-12-22 NOTE — PROGRESS NOTES
Name: Demi Le      : 1970      MRN: [id number]  Encounter Provider: Marissa Willis DO  Encounter Date: 2022   Encounter department: [address] [place] [address]   Patient received Flublok vaccine today  Obtain fasting labs as per endocrinology and then follow-up appointment next month  Continue present treatment  Instructed to follow a low-fat, low-salt and a low sugar/carbohydrate diet more carefully and get regular aerobic exercise walking 150 minutes/week  Weight loss encouraged and discussed losing 10% of body weight or approximately 25 pounds over the next 4 to 6 months  He is encouraged to schedule follow-up sleep study with titration for CPAP and discussed risks associated with untreated sleep apnea including heart and lung disease  Follow-up with specialist as scheduled and return to the office in 6 months  1  Type 2 diabetes mellitus with hyperglycemia, without long-term current use of insulin ([facility] [address] )    2  Primary hypertension    3  Hyperlipidemia, unspecified hyperlipidemia type    4  Class 1 obesity with serious comorbidity and body mass index (BMI) of 34 0 to 34 9 in adult, unspecified obesity type    5  Depression, recurrent ([address] )    6  Vitamin D deficiency    7  Flu vaccine need  -     influenza vaccine, quadrivalent, recombinant, PF, 0 5 mL, for patients 18 yr+ (FLUBLOK)           Subjective      Patient is here for follow-up appoint for chronic conditions and we reviewed recent fasting labs  He has a follow-up appointment with Yohana Khanna's endocrinology and labs ordered for next month  Patient's been feeling well overall  Regular exercise program patient has been unable to lose weight  He admits to not following a healthy diet    Patient had a sleep study several months ago which was positive for obstructive sleep apnea was recommended to schedule titration study although patient canceled secondary to the cost   Patient request flu vaccine today     Hypertension  This is a chronic problem  The problem is controlled  Associated symptoms include anxiety  Pertinent negatives include no blurred vision, chest pain, headaches, orthopnea, palpitations, PND or shortness of breath  Risk factors for coronary artery disease include family history, dyslipidemia, diabetes mellitus, male gender and obesity  Past treatments include angiotensin blockers, diuretics and calcium channel blockers  The current treatment provides significant improvement  Compliance problems include exercise and diet  There is no history of CAD/MI or CVA  Review of Systems   Eyes: Negative for blurred vision  Respiratory: Negative for shortness of breath  Cardiovascular: Negative for chest pain, palpitations, orthopnea and PND  Neurological: Negative for headaches  Current Outpatient Medications on File Prior to Visit   Medication Sig   • ALPRAZolam (XANAX) 0 25 mg tablet Take 1 tablet (0 25 mg total) by mouth 3 (three) times a day as needed for anxiety   • amLODIPine (NORVASC) 5 mg tablet TAKE 1 TABLET (5 MG TOTAL) BY MOUTH IN THE MORNING  • aspirin 81 MG tablet Take 81 mg by mouth daily   • Blood Glucose Monitoring Suppl (ACCU-CHEK JESSY CONNECT) w/Device KIT by Does not apply route   • Cholecalciferol (VITAMIN D3) 2000 UNITS TABS Take 1 tablet by mouth daily   • Empagliflozin 10 MG TABS Take 1 tablet (10 mg total) by mouth every morning   • gabapentin (NEURONTIN) 300 mg capsule TAKE 1 CAPSULE BY MOUTH 3 TIMES A DAY   • glimepiride (AMARYL) 4 mg tablet Take 1 tablet (4 mg total) by mouth in the morning and 1 tablet (4 mg total) in the evening  Take with meals  • Janumet  MG per tablet TAKE 2 TABLETS BY MOUTH EVERY DAY   • Omega-3 Fatty Acids (FISH OIL) 1,000 mg Take 1 capsule by mouth daily   • sertraline (ZOLOFT) 100 mg tablet TAKE 2 TABLETS BY MOUTH DAILY   • sildenafil (REVATIO) 20 mg tablet TAKE 3 TABLETS BY MOUTH DAILY AS NEEDED FOR ERECTILE DYSFUNCTION  • simvastatin (ZOCOR) 40 mg tablet Take 1 tablet (40 mg total) by mouth daily at bedtime   • valsartan-hydrochlorothiazide (DIOVAN-HCT) 320-12 5 MG per tablet TAKE 1 TABLET BY MOUTH EVERY DAY   • [DISCONTINUED] amLODIPine (NORVASC) 5 mg tablet Take 1 tablet (5 mg total) by mouth in the morning  • [DISCONTINUED] Janumet  MG per tablet TAKE 2 TABLETS BY MOUTH EVERY DAY   • [DISCONTINUED] valsartan-hydrochlorothiazide (DIOVAN-HCT) 320-12 5 MG per tablet TAKE 1 TABLET BY MOUTH EVERY DAY       Objective     /76 (BP Location: Left arm, Patient Position: Sitting, Cuff Size: Large)   Pulse 92   Temp 97 8 °F (36 6 °C) (Temporal)   Resp 16   Ht 5' 11" (1 803 m)   Wt 113 kg (250 lb)   SpO2 96%   BMI 34 87 kg/m²     Physical Exam  Constitutional:       General: He is not in acute distress  Appearance: Normal appearance  He is obese  HENT:      Head: Normocephalic  Mouth/Throat:      Mouth: Mucous membranes are moist    Eyes:      General: No scleral icterus  Conjunctiva/sclera: Conjunctivae normal    Neck:      Vascular: No carotid bruit  Cardiovascular:      Rate and Rhythm: Normal rate and regular rhythm  Pulmonary:      Effort: Pulmonary effort is normal       Breath sounds: Normal breath sounds  Abdominal:      Palpations: Abdomen is soft  Tenderness: There is no abdominal tenderness  Musculoskeletal:      Cervical back: Neck supple  Right lower leg: No edema  Left lower leg: No edema  Lymphadenopathy:      Cervical: No cervical adenopathy  Skin:     General: Skin is warm and dry  Neurological:      General: No focal deficit present  Mental Status: He is alert and oriented to person, place, and time  Psychiatric:         Mood and Affect: Mood normal          Behavior: Behavior normal          Thought Content:  Thought content normal          Judgment: Judgment normal        Trent Necessary, DO

## 2022-12-27 ENCOUNTER — AMB VIDEO VISIT (OUTPATIENT)
Dept: OTHER | Facility: HOSPITAL | Age: 52
End: 2022-12-27

## 2022-12-27 DIAGNOSIS — R68.89 FLU-LIKE SYMPTOMS: Primary | ICD-10-CM

## 2022-12-27 PROBLEM — U07.1 COVID-19: Status: RESOLVED | Noted: 2022-10-28 | Resolved: 2022-12-27

## 2022-12-27 PROBLEM — F32.9 REACTIVE DEPRESSION: Status: RESOLVED | Noted: 2018-10-05 | Resolved: 2022-12-27

## 2022-12-27 PROBLEM — S62.655A CLOSED NONDISPLACED FRACTURE OF MIDDLE PHALANX OF LEFT RING FINGER: Status: RESOLVED | Noted: 2019-09-12 | Resolved: 2022-12-27

## 2022-12-27 RX ORDER — OSELTAMIVIR PHOSPHATE 75 MG/1
75 CAPSULE ORAL EVERY 12 HOURS SCHEDULED
Qty: 10 CAPSULE | Refills: 0 | Status: SHIPPED | OUTPATIENT
Start: 2022-12-27 | End: 2023-01-01

## 2022-12-27 NOTE — PATIENT INSTRUCTIONS
note in "Letters" section of [employer] [facility]  Can print if opened from a web browser    Regarding the Flu (influenza)  If you are positive for the flu, Tamiflu, an antiviral medication can be taken  Antiviral treatment works best when started soon after flu illness begins  When treatment is started within two days of becoming sick with flu symptoms, antiviral drugs can lessen fever and flu symptoms and shorten the time you are sick by about one day  They also may reduce the risk of complications such as ear infections in children, respiratory complications requiring antibiotics, and hospitalization in adults  For people at higher risk* of serious flu complications, early treatment with an antiviral drug can mean having milder illness instead of more severe illness that might require a hospital stay  For adults hospitalized with flu illness, some studies have reported that early antiviral treatment can reduce their risk of death  *Following is a list of all the health and age factors that are known to increase a person’s risk of getting serious complications from flu:  Neurologic and neurodevelopment conditions  Blood disorders (such as sickle cell disease)  Chronic lung disease (such as COPD, Cystic Fibrosis, Asthma)  Diabetes Mellitus or other Endocrine disorders  Heart disease (such as congenital heart disease, congestive heart failure and coronary artery disease)  Kidney disorders  Liver disorders  Metabolic disorders (such as inherited metabolic disorders and mitochondrial disorders)  Obesity with a body mass index [BMI] of 40 or higher  People younger than 23years of age on long-term aspirin- or salicylate-containing medications    People with a weakened immune system due to disease (such as people with HIV or AIDS, or some cancers such as leukemia) or medications (such as those receiving chemotherapy or radiation treatment for cancer, or persons with chronic conditions requiring chronic corticosteroids or other drugs that suppress the immune system)    Other people at higher risk from flu: Adults 72 years and older  Children younger than 3years old  Pregnant women and women up to 2 weeks after the end of pregnancy  [place] [address] who live in nursing homes and other long-term care facilities    You likely have a virus such as the flu or a cold  Please schedule a follow-up with your PCP within the next 2 days for recheck  Go to the ER for new worsening or concerning symptoms including but not limited to shortness of breath or chest pain    You can have fever for 3-5 days with a viral illness and then any additional symptoms that develop (congestion,cough, nausea, diarrhea) can last for another 7 days  Stay out of work/school until fever free for 24 hours without use of tylenol or motrin     Make sure you have a thermometer and if you feel chills or sweats check it and write it down  Take Tylenol (acetaminophen) and Motrin (ibuprofen) for fevers, body aches, and headaches  Alternate Motrin and Tylenol every 3 hours for more consistent relief  Drink plenty of fluids to stay well hydrated- at least 2 L per day  Water, Hot water with lemon or honey, warm tea  Can drink Pedialyte, Gatorade/Powerade zero, but should mix with water  Milk or juice can make diarrhea worse  For diarrhea, vomiting and abdominal pain follow BRAT diet (Bananas, Rice, Applesauce, Toast), small frequent meals      Use over-the-counter saline nasal spray/allergy medication for congestion, runny nose, and postnasal drip  Mucinex (guaifenesin) helps to clear mucous  Delsym (dextromethorphan) is a cough suppressant    Decongestants (pseudoephedrine or phenylephrine)  Vicks to the front/back of the chest bottom of the feet with socks      For sore throat relief  Warm salt water gargles, warm tea with honey as needed  Cool mist humidifier at bedside  Chloraseptic spray or throat lozenges with benzocaine (cepacol max strength)

## 2022-12-27 NOTE — PROGRESS NOTES
Video Visit - Miguel Ángel Mansfield 45 y o  male MRN: [id number]    REQUIRED DOCUMENTATION:         1  This service was provided via [facility]  2  Provider located at [address] [place] [id number] [phone]  3  [facility] provider: Brandan Diehl PA-C   4  Identify all parties in room with patient during [facility] visit:  No one else  5  After connecting through [employer], patient was identified by name and date of birth  Patient was then informed that this was a Telemedicine visit and that the exam was being conducted confidentially over secure lines  My office door was closed  No one else was in the room  Patient acknowledged consent and understanding of privacy and security of the Telemedicine visit  I informed the patient that I have reviewed their record in Epic and presented the opportunity for them to ask any questions regarding the visit today  The patient agreed to participate  VITALS: Heart Rate: 72 BPM, Respiratory Rate: 20 RPM, Temperature 100 6° F, Blood Pressure Unavailable mmHg, Pulse Ox Unavailable % on RA    HPI  Flu Shot Thursday  Last night cough, fever tmax 101 2, eyes hurt, chills, fatigue, sore throat, nasal congestion  OTC meds without relief  No known exposures to Flu  COVID 2 months ago, doesn't feel similar  Denies CP or SOB  Normal voiding and BM  Physical Exam  Constitutional:       General: He is not in acute distress  Appearance: Normal appearance  He is obese  He is ill-appearing  He is not toxic-appearing  HENT:      Head: Normocephalic and atraumatic  Nose: No rhinorrhea  Mouth/Throat:      Mouth: Mucous membranes are moist    Eyes:      Conjunctiva/sclera: Conjunctivae normal       Comments: glasses   Cardiovascular:      Rate and Rhythm: Normal rate  Pulmonary:      Effort: Pulmonary effort is normal  No respiratory distress  Breath sounds: No wheezing (no gross audible wheeze through computer)        Comments: Occasional dry cough  Musculoskeletal:      Cervical back: Normal range of motion  Skin:     Findings: No rash (on face or neck)  Neurological:      Mental Status: He is alert  Cranial Nerves: No dysarthria or facial asymmetry  Psychiatric:         Mood and Affect: Mood normal          Behavior: Behavior normal          Diagnoses and all orders for this visit:    Flu-like symptoms  -     Covid/Flu- Collected at [facility] or [facility]  -     oseltamivir (TAMIFLU) 75 mg capsule; Take 1 capsule (75 mg total) by mouth every 12 (twelve) hours for 5 days      Patient Instructions   note in "Letters" section of [facility]  Can print if opened from a web browser    Regarding the Flu (influenza)  If you are positive for the flu, Tamiflu, an antiviral medication can be taken  Antiviral treatment works best when started soon after flu illness begins  When treatment is started within two days of becoming sick with flu symptoms, antiviral drugs can lessen fever and flu symptoms and shorten the time you are sick by about one day  They also may reduce the risk of complications such as ear infections in children, respiratory complications requiring antibiotics, and hospitalization in adults  For people at higher risk* of serious flu complications, early treatment with an antiviral drug can mean having milder illness instead of more severe illness that might require a hospital stay  For adults hospitalized with flu illness, some studies have reported that early antiviral treatment can reduce their risk of death      *Following is a list of all the health and age factors that are known to increase a person’s risk of getting serious complications from flu:  Neurologic and neurodevelopment conditions  Blood disorders (such as sickle cell disease)  Chronic lung disease (such as COPD, Cystic Fibrosis, Asthma)  Diabetes Mellitus or other Endocrine disorders  Heart disease (such as congenital heart disease, congestive heart failure and coronary artery disease)  Kidney disorders  Liver disorders  Metabolic disorders (such as inherited metabolic disorders and mitochondrial disorders)  Obesity with a body mass index [BMI] of 40 or higher  People younger than 23years of age on long-term aspirin- or salicylate-containing medications  People with a weakened immune system due to disease (such as people with HIV or AIDS, or some cancers such as leukemia) or medications (such as those receiving chemotherapy or radiation treatment for cancer, or persons with chronic conditions requiring chronic corticosteroids or other drugs that suppress the immune system)    Other people at higher risk from flu: Adults 72 years and older  Children younger than 3years old  Pregnant women and women up to 2 weeks after the end of pregnancy  [facility] and [address] [place] [address] who live in nursing homes and other long-term care facilities    You likely have a virus such as the flu or a cold  Please schedule a follow-up with your PCP within the next 2 days for recheck  Go to the ER for new worsening or concerning symptoms including but not limited to shortness of breath or chest pain    You can have fever for 3-5 days with a viral illness and then any additional symptoms that develop (congestion,cough, nausea, diarrhea) can last for another 7 days  Stay out of work/school until fever free for 24 hours without use of tylenol or motrin     Make sure you have a thermometer and if you feel chills or sweats check it and write it down  Take Tylenol (acetaminophen) and Motrin (ibuprofen) for fevers, body aches, and headaches  Alternate Motrin and Tylenol every 3 hours for more consistent relief  Drink plenty of fluids to stay well hydrated- at least 2 L per day  • Water, Hot water with lemon or honey, warm tea  • Can drink Pedialyte, Gatorade/Powerade zero, but should mix with water  • Milk or juice can make diarrhea worse       For diarrhea, vomiting and abdominal pain follow BRAT diet (Bananas, Rice, Applesauce, Toast), small frequent meals      Use over-the-counter saline nasal spray/allergy medication for congestion, runny nose, and postnasal drip  • Mucinex (guaifenesin) helps to clear mucous  Delsym (dextromethorphan) is a cough suppressant  • Decongestants (pseudoephedrine or phenylephrine)  • Vicks to the front/back of the chest bottom of the feet with socks      For sore throat relief  • Warm salt water gargles, warm tea with honey as needed  • Cool mist humidifier at bedside  • Chloraseptic spray or throat lozenges with benzocaine (cepacol max strength)

## 2022-12-27 NOTE — CARE ANYWHERE EVISITS
Visit Summary for [facility] - Gender: Male - Date of Birth: [id number]  Date: [id number] - Duration: 13 minutes  Patient: [facility]  Provider: Trudi Earl PA-C    Patient Contact Information  Address  [address] [place] [address]; [address] [place] [address]  [phone]    Visit Topics  Flu-Like Symptoms [Added By: Self - 2022-12-27]  Headache [Added By: Self - 2022-12-27]  Fever [Added By: Self - [id number]]    Triage Questions   What is your current physical address in the event of a medical emergency? Answer []  Are you allergic to any medications? Answer []  Are you now or could you be pregnant? Answer []  Do you have any immune system compromise or chronic lung   disease? Answer []  Do you have any vulnerable family members in the home (infant, pregnant, cancer, elderly)? Answer []     Conversation Transcripts  [0A][0A] [Notification] You are connected with Trudi Earl PA-C, Urgent Care Specialist [0A][Notification] Marylen Glasser is located in [address] [place] [address]  [0A][Notification] Marylen Glasser has shared health history  [facility]  [0A]    Diagnosis  Oth symptoms and signs involving the circ and resp systems    Procedures  Value: 55422 Code: CPT-4 UNLISTED E&M SERVICE    Medications Prescribed    No prescriptions ordered    Electronically signed by: Yesenia Hebert(NPI [id number])

## 2022-12-27 NOTE — LETTER
[facility] VISIT VIR  [address] [address]    December 27, 2022     Patient: Mickey Stover   YOB: 1970   Date of Visit: 12/27/2022       To Whom it May Concern:    Mickey Stover was seen and evaluated virtually on 12/27/2022 and is being tested for COVID/Flu  Please note if COVID and Flu tests are negative, He may return to work when fever free for 24 hours without the use of a fever reducing agent  If COVID or Flu test is positive, He may return on 1/1/23 if fever free for 24 hours, as this is 5 days from the onset of symptoms  Upon return, He must then adhere to strict masking for an additional 5 days  If you have any questions or concerns, please don't hesitate to call           Sincerely,          Darian Reynolds PA-C        CC: No Recipients

## 2022-12-28 LAB
FLUAV RNA RESP QL NAA+PROBE: POSITIVE
FLUBV RNA RESP QL NAA+PROBE: NEGATIVE
SARS-COV-2 RNA RESP QL NAA+PROBE: NEGATIVE

## 2022-12-30 DIAGNOSIS — E11.65 TYPE 2 DIABETES MELLITUS WITH HYPERGLYCEMIA, WITHOUT LONG-TERM CURRENT USE OF INSULIN (HCC): ICD-10-CM

## 2022-12-30 RX ORDER — EMPAGLIFLOZIN 10 MG/1
TABLET, FILM COATED ORAL
Qty: 30 TABLET | Refills: 6 | Status: SHIPPED | OUTPATIENT
Start: 2022-12-30

## 2023-01-03 DIAGNOSIS — E78.5 HYPERLIPIDEMIA, UNSPECIFIED HYPERLIPIDEMIA TYPE: ICD-10-CM

## 2023-01-03 RX ORDER — SIMVASTATIN 40 MG
TABLET ORAL
Qty: 90 TABLET | Refills: 0 | Status: SHIPPED | OUTPATIENT
Start: 2023-01-03

## 2023-01-09 DIAGNOSIS — E11.42 DIABETIC PERIPHERAL NEUROPATHY (HCC): ICD-10-CM

## 2023-01-09 RX ORDER — GABAPENTIN 300 MG/1
CAPSULE ORAL
Qty: 90 CAPSULE | Refills: 5 | Status: SHIPPED | OUTPATIENT
Start: 2023-01-09

## 2023-01-25 LAB
LEFT EYE DIABETIC RETINOPATHY: POSITIVE
RIGHT EYE DIABETIC RETINOPATHY: POSITIVE

## 2023-01-26 DIAGNOSIS — E11.69 TYPE 2 DIABETES MELLITUS WITH OTHER SPECIFIED COMPLICATION, WITHOUT LONG-TERM CURRENT USE OF INSULIN (HCC): ICD-10-CM

## 2023-01-26 RX ORDER — GLIMEPIRIDE 4 MG/1
TABLET ORAL
Qty: 180 TABLET | Refills: 0 | Status: SHIPPED | OUTPATIENT
Start: 2023-01-26

## 2023-02-06 ENCOUNTER — OFFICE VISIT (OUTPATIENT)
Dept: UROLOGY | Facility: HOSPITAL | Age: 53
End: 2023-02-06

## 2023-02-06 VITALS
BODY MASS INDEX: 34.17 KG/M2 | SYSTOLIC BLOOD PRESSURE: 142 MMHG | HEART RATE: 108 BPM | OXYGEN SATURATION: 96 % | DIASTOLIC BLOOD PRESSURE: 98 MMHG | WEIGHT: 245 LBS

## 2023-02-06 DIAGNOSIS — R35.0 URINARY FREQUENCY: Primary | ICD-10-CM

## 2023-02-06 DIAGNOSIS — Z12.5 SCREENING FOR PROSTATE CANCER: ICD-10-CM

## 2023-02-06 DIAGNOSIS — Z87.442 HISTORY OF KIDNEY STONES: ICD-10-CM

## 2023-02-06 DIAGNOSIS — R39.15 URGENCY OF URINATION: ICD-10-CM

## 2023-02-06 LAB
POST-VOID RESIDUAL VOLUME, ML POC: 12 ML
SL AMB  POCT GLUCOSE, UA: NORMAL
SL AMB LEUKOCYTE ESTERASE,UA: NORMAL
SL AMB POCT BILIRUBIN,UA: NORMAL
SL AMB POCT BLOOD,UA: NORMAL
SL AMB POCT CLARITY,UA: CLEAR
SL AMB POCT COLOR,UA: NORMAL
SL AMB POCT KETONES,UA: NORMAL
SL AMB POCT NITRITE,UA: NORMAL
SL AMB POCT PH,UA: 6.5
SL AMB POCT SPECIFIC GRAVITY,UA: 1.01
SL AMB POCT URINE PROTEIN: NORMAL
SL AMB POCT UROBILINOGEN: 0.2

## 2023-02-06 RX ORDER — TAMSULOSIN HYDROCHLORIDE 0.4 MG/1
0.4 CAPSULE ORAL
Qty: 30 CAPSULE | Refills: 1 | Status: SHIPPED | OUTPATIENT
Start: 2023-02-06

## 2023-02-06 NOTE — PROGRESS NOTES
02/06/23    Reba Hammans   1970   [id number]     Assessment  1 Frequency of urination   2 Nocturia  3 ED   4 History of nephrolithiasis  5 Screening for prostate cancer     Discussion/Plan  1 Frequency of urination    Urine dip in office: positive leukocytes, positive glucose, positive protein     Urinalysis and urine culture   2 Nocturia   AUA 14   Trial Tamsulosin 0 4 mg once daily    Rx information sheet provided    Limit consumption of bladder irritating beverages, hydrate with water   Blood glucose management     Discussed importance of hydration with water while taking Jardiance    3 ED    Discontinued Sildenafil due to side effects    Consider Cialis as alternative - patient defers   4 History of nephrolithiasis    KUB Xray ordered   Educational packet provided, dietary recommendations reviewed    Stone analysis: calcium oxalate   5 Screening for prostate cancer   PSA ordered     Follow up in 4 weeks for PVR and compare AUA  Subjective  HPI   Barbie Morocho is a former patient of Dr Loki Swan who returns to re-establish Urologic care  He is a 45year old male with a past history of kidney stones and ED  He endorses urinary frequency, urgency, and nocturia  He consumes coffee 2-3 cups daily, soda, energy drinks, and 1-2 water bottles daily  He was recently prescribed Jardiance for DM2  His A1c from October 2022 was 6 9%  He experiences the urge to urinate every 1-2 hours during the day especially at work  He denies family history of  malignancy  Denies smoking history  He was exposed to second-hand smoke as a child growing up  He previously took Sildenafil for ED however he experienced severe indigestion as a side effect and stopped taking this medication       Component      Latest Ref Rng & Units 11/15/2018 1/12/2019 5/5/2022           9:14 AM 10:49 AM  7:27 AM   PSA, Total      0 0 - 4 0 ng/mL 7 4 (H) 0 8 0 8       Review of Systems - History obtained from chart review and the patient  General ROS: negative  Psychological ROS: negative  Respiratory ROS: no cough, shortness of breath, or wheezing  Cardiovascular ROS: no chest pain or dyspnea on exertion  Gastrointestinal ROS: no abdominal pain, change in bowel habits, or black or bloody stools  Genito-Urinary ROS: positive for - erectile dysfunction, nocturia and urinary frequency/urgency  Musculoskeletal ROS: negative  Neurological ROS: negative  Dermatological ROS: negative       Objective  Physical Exam  Vitals and nursing note reviewed  Constitutional:       General: He is awake  He is not in acute distress  Appearance: Normal appearance  He is well-developed, well-groomed and normal weight  He is not ill-appearing, toxic-appearing or diaphoretic  Pulmonary:      Effort: Pulmonary effort is normal    Abdominal:      Tenderness: There is no right CVA tenderness or left CVA tenderness  Musculoskeletal:         General: Normal range of motion  Cervical back: Normal range of motion and neck supple  Skin:     General: Skin is warm  Neurological:      General: No focal deficit present  Mental Status: He is alert and oriented to person, place, and time  Mental status is at baseline  Psychiatric:         Attention and Perception: Attention normal          Mood and Affect: Mood normal          Speech: Speech normal          Behavior: Behavior normal  Behavior is cooperative  Thought Content: Thought content normal          Cognition and Memory: Cognition normal          Judgment: Judgment normal            AUA SYMPTOM SCORE    Flowsheet Row Most Recent Value   AUA SYMPTOM SCORE    How often have you had a sensation of not emptying your bladder completely after you finished urinating? 2   How often have you had to urinate again less than two hours after you finished urinating? 3   How often have you found you stopped and started again several times when you urinate? 1   How often have you found it difficult to postpone urination?  1 How often have you had a weak urinary stream? 3   How often have you had to push or strain to begin urination? 1   How many times did you most typically get up to urinate from the time you went to bed at night until the time you got up in the morning? 3   Quality of Life: If you were to spend the rest of your life with your urinary condition just the way it is now, how would you feel about that? 2   AUA SYMPTOM SCORE 14              DUSTIN Tenorio     I have spent 15 minutes with Patient  today in which greater than 50% of this time was spent in counseling/coordination of care regarding Risks and benefits of tx options, Intructions for management and Patient and family education

## 2023-02-07 LAB
BACTERIA UR CULT: NORMAL
BACTERIA UR QL AUTO: ABNORMAL /HPF
BILIRUB UR QL STRIP: NEGATIVE
CLARITY UR: CLEAR
COLOR UR: ABNORMAL
GLUCOSE UR STRIP-MCNC: ABNORMAL MG/DL
HGB UR QL STRIP.AUTO: NEGATIVE
KETONES UR STRIP-MCNC: NEGATIVE MG/DL
LEUKOCYTE ESTERASE UR QL STRIP: ABNORMAL
NITRITE UR QL STRIP: NEGATIVE
NON-SQ EPI CELLS URNS QL MICRO: ABNORMAL /HPF
PH UR STRIP.AUTO: 6.5 [PH]
PROT UR STRIP-MCNC: ABNORMAL MG/DL
RBC #/AREA URNS AUTO: ABNORMAL /HPF
SP GR UR STRIP.AUTO: 1.03 (ref 1–1.03)
UROBILINOGEN UR STRIP-ACNC: <2 MG/DL
WBC #/AREA URNS AUTO: ABNORMAL /HPF

## 2023-03-01 DIAGNOSIS — R39.15 URGENCY OF URINATION: ICD-10-CM

## 2023-03-01 DIAGNOSIS — R35.0 URINARY FREQUENCY: ICD-10-CM

## 2023-03-01 RX ORDER — TAMSULOSIN HYDROCHLORIDE 0.4 MG/1
CAPSULE ORAL
Qty: 90 CAPSULE | Refills: 1 | Status: SHIPPED | OUTPATIENT
Start: 2023-03-01

## 2023-03-17 ENCOUNTER — APPOINTMENT (OUTPATIENT)
Dept: LAB | Age: 53
End: 2023-03-17

## 2023-03-17 DIAGNOSIS — E11.65 TYPE 2 DIABETES MELLITUS WITH HYPERGLYCEMIA, WITHOUT LONG-TERM CURRENT USE OF INSULIN (HCC): ICD-10-CM

## 2023-03-17 DIAGNOSIS — Z12.5 SCREENING FOR PROSTATE CANCER: ICD-10-CM

## 2023-03-17 LAB
ALBUMIN SERPL BCP-MCNC: 4 G/DL (ref 3.5–5)
ALP SERPL-CCNC: 72 U/L (ref 46–116)
ALT SERPL W P-5'-P-CCNC: 44 U/L (ref 12–78)
ANION GAP SERPL CALCULATED.3IONS-SCNC: 4 MMOL/L (ref 4–13)
AST SERPL W P-5'-P-CCNC: 29 U/L (ref 5–45)
BILIRUB SERPL-MCNC: 1.01 MG/DL (ref 0.2–1)
BUN SERPL-MCNC: 24 MG/DL (ref 5–25)
CALCIUM SERPL-MCNC: 9.4 MG/DL (ref 8.3–10.1)
CHLORIDE SERPL-SCNC: 104 MMOL/L (ref 96–108)
CHOLEST SERPL-MCNC: 125 MG/DL
CO2 SERPL-SCNC: 29 MMOL/L (ref 21–32)
CREAT SERPL-MCNC: 1.24 MG/DL (ref 0.6–1.3)
EST. AVERAGE GLUCOSE BLD GHB EST-MCNC: 157 MG/DL
GFR SERPL CREATININE-BSD FRML MDRD: 65 ML/MIN/1.73SQ M
GLUCOSE P FAST SERPL-MCNC: 108 MG/DL (ref 65–99)
HBA1C MFR BLD: 7.1 %
HDLC SERPL-MCNC: 25 MG/DL
LDLC SERPL DIRECT ASSAY-MCNC: 59 MG/DL (ref 0–100)
POTASSIUM SERPL-SCNC: 3.6 MMOL/L (ref 3.5–5.3)
PROT SERPL-MCNC: 7.4 G/DL (ref 6.4–8.4)
PSA SERPL-MCNC: 0.6 NG/ML (ref 0–4)
SODIUM SERPL-SCNC: 137 MMOL/L (ref 135–147)
TRIGL SERPL-MCNC: 434 MG/DL

## 2023-03-24 ENCOUNTER — OFFICE VISIT (OUTPATIENT)
Dept: ENDOCRINOLOGY | Facility: HOSPITAL | Age: 53
End: 2023-03-24

## 2023-03-24 VITALS
HEIGHT: 71 IN | WEIGHT: 245.8 LBS | HEART RATE: 92 BPM | DIASTOLIC BLOOD PRESSURE: 84 MMHG | SYSTOLIC BLOOD PRESSURE: 144 MMHG | BODY MASS INDEX: 34.41 KG/M2

## 2023-03-24 DIAGNOSIS — E11.65 TYPE 2 DIABETES MELLITUS WITH HYPERGLYCEMIA, WITHOUT LONG-TERM CURRENT USE OF INSULIN (HCC): Primary | ICD-10-CM

## 2023-03-24 DIAGNOSIS — E55.9 VITAMIN D DEFICIENCY: ICD-10-CM

## 2023-03-24 DIAGNOSIS — E11.42 DIABETIC PERIPHERAL NEUROPATHY (HCC): ICD-10-CM

## 2023-03-24 DIAGNOSIS — I10 PRIMARY HYPERTENSION: ICD-10-CM

## 2023-03-24 DIAGNOSIS — E78.5 HYPERLIPIDEMIA, UNSPECIFIED HYPERLIPIDEMIA TYPE: ICD-10-CM

## 2023-03-24 NOTE — PROGRESS NOTES
Leora Shelley 52 y o  male MRN: [id number]    Encounter: [id number]      Assessment/Plan     Assessment: This is a 52y o -year-old male with type 2 diabetes with neuropathy, hypertension and hyperlipidemia  Plan:  1  Type 2 diabetes: Most recent hemoglobin A1c was 7 1  This is not horrible, but up slightly since last office visit  Would encourage lifestyle modifications to help improve glucose levels  He will continue with Jardiance 10 mg daily, Janumet  mg twice a day, glimepiride 4 mg twice a day  Continue with lifestyle modifications including walking daily, and limiting carbohydrates  Contact the office with any concerns or questions  Follow-up in 3 months with lab work completed prior to visit  2  Diabetic neuropathy:  Stable  Diabetic foot exam completed today  Did put in referral for podiatry      3  Hypertension:  Normotensive in the office today  Kidney function stable with normal electrolytes     We will monitor at this time  Continue with current medications   Repeat CMP prior to next office visit      4  Hyperlipidemia: Triglycerides continue to be elevated  May need to start fenofibrate in the future  Continue with lifestyle modifications to help improve triglycerides, and hopefully increase HDL       CC: Type 2 diabetes follow-up    History of Present Illness     HPI:  Mata Mckinley H 42 [facility] old male with type 2 diabetes with neuropathy for 15 years, hypertension, hyperlipidemia for follow-up   He 1st started on metformin and eventually this was switched to [address] [place] [address] Road had tried Invokana in the past but it caused nausea  Camillerylan Galvin is still having higher blood sugars in the morning so was concerned about his diabetes especially in light of his family history that is strong with diabetes causing amputations and early mortality   He is on oral agents at home and takes Janumet 50/1000 mg twice a day, Jardiance 10 mg daily, and glimepiride 4 mg twice a day  He denies any polyuria, polydipsia, polyphagia, and blurry vision   He has nocturia 0 to twice a night   He has numbness and tingling of the feet unchanged  He denies chest pain or shortness of breath   He denies nephropathy, retinopathy, heart attack, stroke and claudication but does admit to neuropathy  Overall doing well today without any concerns or questions  States that he started walking at lunch once again  Has not made any other significant lifestyle modifications  Does limit carbohydrates in his diet      He did follow-up with [facility] 2022        He has a strong family history of diabetes in both paternal and maternal side of the family  Brad Rea  early at 61 with amputations from diabetes and paternal aunt  in her 62s with leg amputations      Hypoglycemic episodes: No never  Will have low blood sugar symptoms in the 's  H/o of hypoglycemia causing hospitalization or intervention such as glucagon injection  or ambulance call  No   Hypoglycemia symptoms: jitteriness and nausea  Treatment of hypoglycemia: will drink OJ or eat sugar  Glucagon:No   Medic alert tag: recommended,Yes       The patient's last eye exam was in spring 2021 with no retinopathy   The patient's last foot exam was 2022  Antoni Sparks does not see Podiatry  Most recent hemoglobin A1c completed on 2023 was 7  1      Blood Sugar/Glucometer/Pump/CGM review:  Has not necessarily been checking blood sugars on a routine basis  States blood sugars are averaging in the mid 100s     He has hyperlipidemia and takes simvastatin 40 mg daily  Antoni Sparks denies chest pain or shortness of breath        He has hypertension and takes amlodipine 5 mg daily and valsartan/HCTZ 320/12 5 mg daily  Antoni Sparks denies headache or stroke-like symptoms   When he was on a higher dose of amlodipine 10 mg daily, he had orthostatics symptoms and fatigue       Review of Systems   Constitutional: Negative for activity change, appetite change, fatigue and unexpected weight change  HENT: Negative for trouble swallowing  Eyes: Negative for visual disturbance  Respiratory: Positive for apnea  Negative for chest tightness and shortness of breath  Cardiovascular: Negative for chest pain, palpitations and leg swelling  Gastrointestinal: Negative for abdominal pain, diarrhea, nausea and vomiting  Endocrine: Negative for cold intolerance, heat intolerance, polydipsia, polyphagia and polyuria  Occasional nocturia   Genitourinary: Negative for frequency  Skin: Negative for rash and wound  Neurological: Positive for numbness  Negative for dizziness, weakness, light-headedness and headaches  Psychiatric/Behavioral: Negative for dysphoric mood and sleep disturbance  The patient is not nervous/anxious          Historical Information   Past Medical History:   Diagnosis Date   • Anxiety    • COVID-19 10/28/2022   • Diabetes mellitus ([facility] [address] )    • Hyperlipidemia    • Hypertension    • Kidney stone    • Psychiatric disorder     Anxiety     Past Surgical History:   Procedure Laterality Date   • EXTRACORPOREAL SHOCK WAVE LITHOTRIPSY     • KNEE ARTHROSCOPY Left     torn meniscus   • CT CYSTO/URETERO W/LITHOTRIPSY &INDWELL STENT INSRT Left 5/23/2017    Procedure: CYSTOSCOPY; URETEROSCOPY; HOLMIUM LASER; RETROGRADE PYELOGRAM; STENT PLACEMENT ;  Surgeon: Miguelito Mccracken MD;  [facility];  Service: Urology   • CT LITHOTRIPSY XTRCORP SHOCK WAVE Left 5/18/2017    Procedure: ESWL ;  Surgeon: Jeet Hannah MD;  [facility];  Service: Urology     Social History   Social History     Substance and Sexual Activity   Alcohol Use Yes   • Alcohol/week: 5 0 standard drinks   • Types: 5 Standard drinks or equivalent per week    Comment: SOCIAL- 3-4 drinks a week with a meal     Social History     Substance and Sexual Activity   Drug Use No     Social History     Tobacco Use   Smoking Status Never   Smokeless Tobacco Never     Family History:   Family History   Problem Relation Age of Onset   • Cancer Maternal Grandmother    • Breast cancer Maternal Grandmother    • Diabetes unspecified Mother         prediabetes   • Thyroid disease unspecified Mother    • Diabetes Father    • Hypertension Father    • Hyperlipidemia Father    • Diabetes type II Father         toe amputation   • No Known Problems Sister    • Alcohol abuse Brother    • Polycystic ovary syndrome Daughter         possibly, on metformin after weight gain   • No Known Problems Daughter        Meds/Allergies   Current Outpatient Medications   Medication Sig Dispense Refill   • ALPRAZolam (XANAX) 0 25 mg tablet Take 1 tablet (0 25 mg total) by mouth 3 (three) times a day as needed for anxiety 30 tablet 0   • amLODIPine (NORVASC) 5 mg tablet TAKE 1 TABLET (5 MG TOTAL) BY MOUTH IN THE MORNING  90 tablet 1   • aspirin 81 MG tablet Take 81 mg by mouth daily     • Blood Glucose Monitoring Suppl (ACCU-CHEK JESSY CONNECT) w/Device KIT by Does not apply route     • Cholecalciferol (VITAMIN D3) 2000 UNITS TABS Take 1 tablet by mouth daily     • gabapentin (NEURONTIN) 300 mg capsule TAKE 1 CAPSULE BY MOUTH THREE TIMES A DAY 90 capsule 5   • glimepiride (AMARYL) 4 mg tablet TAKE 1 TABLET BY MOUTH IN THE MORNING AND 1 TABLET IN THE EVENING  TAKE WITH MEALS  180 tablet 0   • Janumet  MG per tablet TAKE 2 TABLETS BY MOUTH EVERY  tablet 1   • Jardiance 10 MG TABS tablet TAKE 1 TABLET BY MOUTH EVERY MORNING  30 tablet 6   • Omega-3 Fatty Acids (FISH OIL) 1,000 mg Take 1 capsule by mouth daily     • sertraline (ZOLOFT) 100 mg tablet TAKE 2 TABLETS BY MOUTH DAILY 180 tablet 1   • sildenafil (REVATIO) 20 mg tablet TAKE 3 TABLETS BY MOUTH DAILY AS NEEDED FOR ERECTILE DYSFUNCTION   90 tablet 0   • simvastatin (ZOCOR) 40 mg tablet TAKE 1 TABLET BY MOUTH DAILY AT BEDTIME 90 tablet 0   • tamsulosin (FLOMAX) 0 4 mg TAKE 1 CAPSULE BY MOUTH EVERY DAY WITH DINNER 90 capsule 1   • valsartan-hydrochlorothiazide (DIOVAN-HCT) 320-12 5 MG per tablet TAKE 1 TABLET BY MOUTH EVERY DAY 90 tablet 1     No current facility-administered medications for this visit  No Known Allergies    Objective   Vitals: Blood pressure 144/84, pulse 92, height 5' 11" (1 803 m), weight 111 kg (245 lb 12 8 oz)  Physical Exam  Vitals and nursing note reviewed  Constitutional:       General: He is not in acute distress  Appearance: Normal appearance  He is not diaphoretic  HENT:      Head: Normocephalic and atraumatic  Eyes:      General: No scleral icterus  Extraocular Movements: Extraocular movements intact  Conjunctiva/sclera: Conjunctivae normal       Pupils: Pupils are equal, round, and reactive to light  Cardiovascular:      Rate and Rhythm: Normal rate and regular rhythm  Pulses: no weak pulses          Dorsalis pedis pulses are 2+ on the right side and 2+ on the left side  Posterior tibial pulses are 2+ on the right side and 2+ on the left side  Heart sounds: No murmur heard  Pulmonary:      Effort: Pulmonary effort is normal  No respiratory distress  Breath sounds: Normal breath sounds  No wheezing  Musculoskeletal:      Cervical back: Normal range of motion  Right lower leg: No edema  Left lower leg: No edema  Feet:      Right foot:      Skin integrity: No ulcer, skin breakdown, erythema, warmth, callus or dry skin  Left foot:      Skin integrity: No ulcer, skin breakdown, erythema, warmth, callus or dry skin  Lymphadenopathy:      Cervical: No cervical adenopathy  Skin:     General: Skin is warm and dry  Neurological:      Mental Status: He is alert and oriented to person, place, and time  Mental status is at baseline  Sensory: No sensory deficit  Gait: Gait normal    Psychiatric:         Mood and Affect: Mood normal          Behavior: Behavior normal          Thought Content: Thought content normal      Patient's shoes and socks removed      Right Foot/Ankle   Right Foot Inspection  Skin Exam: skin normal and skin intact  No dry skin, no warmth, no callus, no erythema, no maceration, no abnormal color, no pre-ulcer, no ulcer and no callus  Toe Exam: ROM and strength within normal limits  No tenderness, erythema and  no right toe deformity    Sensory   Vibration: diminished  Proprioception: intact  Monofilament testing: diminished    Vascular  Capillary refills: < 3 seconds  The right DP pulse is 2+  The right PT pulse is 2+  Left Foot/Ankle  Left Foot Inspection  Skin Exam: skin normal and skin intact  No dry skin, no warmth, no erythema, no maceration, normal color, no pre-ulcer, no ulcer and no callus  Toe Exam: ROM and strength within normal limits  No tenderness, no erythema and no left toe deformity  Sensory   Vibration: diminished  Proprioception: intact  Monofilament testing: diminished    Vascular  Capillary refills: < 3 seconds  The left DP pulse is 2+  The left PT pulse is 2+  Assign Risk Category  No deformity present  Loss of protective sensation  No weak pulses  Risk: 1        The history was obtained from the review of the chart, patient      Lab Results:   Lab Results   Component Value Date/Time    Hemoglobin A1C 7 1 (H) 03/17/2023 07:18 AM    Hemoglobin A1C 6 9 (H) 10/19/2022 07:07 AM    Hemoglobin A1C 8 2 (H) 05/05/2022 07:27 AM    BUN 24 03/17/2023 07:18 AM    BUN 26 (H) 10/19/2022 07:07 AM    BUN 24 05/05/2022 07:27 AM    Potassium 3 6 03/17/2023 07:18 AM    Potassium 3 5 10/19/2022 07:07 AM    Potassium 3 7 05/05/2022 07:27 AM    Chloride 104 03/17/2023 07:18 AM    Chloride 102 10/19/2022 07:07 AM    Chloride 99 (L) 05/05/2022 07:27 AM    CO2 29 03/17/2023 07:18 AM    CO2 28 10/19/2022 07:07 AM    CO2 30 05/05/2022 07:27 AM    Creatinine 1 24 03/17/2023 07:18 AM    Creatinine 1 40 (H) 10/19/2022 07:07 AM    Creatinine 1 33 (H) 05/05/2022 07:27 AM    AST 29 03/17/2023 07:18 AM    AST 33 10/19/2022 07:07 AM    AST 29 05/05/2022 07:27 AM    ALT 44 03/17/2023 07:18 AM    ALT 53 10/19/2022 07:07 AM    ALT 43 05/05/2022 07:27 AM    Albumin 4 0 03/17/2023 07:18 AM    Albumin 4 1 10/19/2022 07:07 AM    Albumin 4 2 05/05/2022 07:27 AM    HDL, Direct 25 (L) 03/17/2023 07:18 AM    HDL, Direct 31 (L) 05/05/2022 07:27 AM    Triglycerides 434 (H) 03/17/2023 07:18 AM    Triglycerides 432 (H) 05/05/2022 07:27 AM         Portions of the record may have been created with voice recognition software  Occasional wrong word or "sound a like" substitutions may have occurred due to the inherent limitations of voice recognition software  Read the chart carefully and recognize, using context, where substitutions have occurred

## 2023-03-30 ENCOUNTER — OFFICE VISIT (OUTPATIENT)
Dept: PODIATRY | Facility: CLINIC | Age: 53
End: 2023-03-30

## 2023-03-30 VITALS
WEIGHT: 245 LBS | HEIGHT: 71 IN | BODY MASS INDEX: 34.3 KG/M2 | SYSTOLIC BLOOD PRESSURE: 149 MMHG | DIASTOLIC BLOOD PRESSURE: 93 MMHG | HEART RATE: 112 BPM

## 2023-03-30 DIAGNOSIS — E11.65 TYPE 2 DIABETES MELLITUS WITH HYPERGLYCEMIA, WITHOUT LONG-TERM CURRENT USE OF INSULIN (HCC): ICD-10-CM

## 2023-03-30 DIAGNOSIS — F41.9 ANXIETY: ICD-10-CM

## 2023-03-30 DIAGNOSIS — E11.42 DIABETIC PERIPHERAL NEUROPATHY (HCC): ICD-10-CM

## 2023-03-30 DIAGNOSIS — E78.5 HYPERLIPIDEMIA, UNSPECIFIED HYPERLIPIDEMIA TYPE: ICD-10-CM

## 2023-03-30 DIAGNOSIS — R20.2 PARESTHESIA OF BOTH FEET: Primary | ICD-10-CM

## 2023-03-30 RX ORDER — ALPRAZOLAM 0.25 MG/1
0.25 TABLET ORAL 3 TIMES DAILY PRN
Qty: 30 TABLET | Refills: 0 | Status: SHIPPED | OUTPATIENT
Start: 2023-03-30

## 2023-03-30 RX ORDER — SIMVASTATIN 40 MG
TABLET ORAL
Qty: 90 TABLET | Refills: 0 | Status: SHIPPED | OUTPATIENT
Start: 2023-03-30

## 2023-03-30 NOTE — LETTER
April 6, 2023     Clay Crespo, [address]  [address] [address]  [address] [address] [address]    Patient: Jose Kohli   YOB: 1970   Date of Visit: 3/30/2023       Dear Dr Mccullough Gu: Thank you for referring Jose Kohli to me for evaluation  Below are my notes for this consultation  If you have questions, please do not hesitate to call me  I look forward to following your patient along with you  Sincerely,        Syl Nieves DPM        CC: No Recipients  GABBIE Mina [facility]  4/6/2023  1:35 PM  Signed  Assessment/Plan:    Paresthesia of both feet  -     Encouraged good blood sugar management to minimize progression of diabetic neuropathy   - Proper shoe gear selection reviewed and encouraged  - Follow-up in 1 year  Type 2 diabetes mellitus with hyperglycemia, without long-term current use of insulin ([facility] [address] )  -     Ambulatory referral to Podiatry    Diabetic peripheral neuropathy [facility])  -     Ambulatory referral to Podiatry       Subjective:     Patient ID: Jose Kohli is a 52 y o  male  3/30/2023: 12-year-old type II diabetic presents for diabetic foot exam, reports burning sensation distal forefoot bilateral   Denies any acute injury  The following portions of the patient's history were reviewed and updated as appropriate: allergies, current medications, past family history, past medical history, past social history, past surgical history and problem list     Review of Systems   Constitutional: Negative for chills and fever  HENT: Negative for ear pain and sore throat  Eyes: Negative for pain and visual disturbance  Respiratory: Negative for cough and shortness of breath  Cardiovascular: Negative for chest pain and palpitations  Gastrointestinal: Negative for abdominal pain and vomiting  Endocrine:        Type 2 diabetes   Genitourinary: Negative for dysuria and hematuria  Musculoskeletal: Negative for arthralgias and back pain     Skin: Negative for color change "and rash  Neurological: Negative for seizures and syncope  Reports burning sensation distal forefoot bilateral   All other systems reviewed and are negative  Objective:      /93   Pulse (!) 112   Ht 5' 11\" (1 803 m)   Wt 111 kg (245 lb)   BMI 34 17 kg/m²         Physical Exam  Cardiovascular:      Pulses: no weak pulses          Dorsalis pedis pulses are 1+ on the right side and 1+ on the left side  Posterior tibial pulses are 2+ on the right side and 2+ on the left side  Musculoskeletal:         General: Normal range of motion  Feet:      Right foot:      Skin integrity: Skin integrity normal  No ulcer, skin breakdown, erythema, warmth, callus or dry skin  Toenail Condition: Right toenails are normal       Left foot:      Skin integrity: Skin integrity normal  No ulcer, skin breakdown, erythema, warmth, callus or dry skin  Toenail Condition: Left toenails are normal    Skin:     Comments: Texture tone and turgor are within normal limits, digital hair noted  No lesions or ulcerations noted  Neurological:      General: No focal deficit present  Mental Status: He is oriented to person, place, and time  Comments: Burning sensation distal forefoot bilateral           Diabetic Foot Exam    Patient's shoes and socks removed  Right Foot/Ankle   Right Foot Inspection  Skin Exam: skin normal and skin intact  No dry skin, no warmth, no callus, no erythema, no maceration, no abnormal color, no pre-ulcer, no ulcer and no callus  Toe Exam: ROM and strength within normal limits  Sensory   Vibration: intact  Proprioception: intact  Monofilament testing: intact    Vascular  Capillary refills: < 3 seconds  The right DP pulse is 1+  The right PT pulse is 2+  Left Foot/Ankle  Left Foot Inspection  Skin Exam: skin normal and skin intact  No dry skin, no warmth, no erythema, no maceration, normal color, no pre-ulcer, no ulcer and no callus       Toe Exam: ROM and " strength within normal limits  Sensory   Vibration: intact  Proprioception: intact  Monofilament testing: intact    Vascular  Capillary refills: < 3 seconds  The left DP pulse is 1+  The left PT pulse is 2+       Assign Risk Category  No deformity present  No loss of protective sensation  No weak pulses  Risk: 0

## 2023-04-01 ENCOUNTER — OFFICE VISIT (OUTPATIENT)
Dept: URGENT CARE | Facility: CLINIC | Age: 53
End: 2023-04-01

## 2023-04-01 ENCOUNTER — APPOINTMENT (OUTPATIENT)
Dept: RADIOLOGY | Facility: CLINIC | Age: 53
End: 2023-04-01

## 2023-04-01 VITALS
OXYGEN SATURATION: 98 % | TEMPERATURE: 97.7 F | DIASTOLIC BLOOD PRESSURE: 98 MMHG | HEIGHT: 71 IN | RESPIRATION RATE: 18 BRPM | HEART RATE: 98 BPM | WEIGHT: 248 LBS | BODY MASS INDEX: 34.72 KG/M2 | SYSTOLIC BLOOD PRESSURE: 158 MMHG

## 2023-04-01 DIAGNOSIS — G89.29 CHRONIC RIGHT SHOULDER PAIN: Primary | ICD-10-CM

## 2023-04-01 DIAGNOSIS — M25.511 CHRONIC RIGHT SHOULDER PAIN: ICD-10-CM

## 2023-04-01 DIAGNOSIS — G89.29 CHRONIC RIGHT SHOULDER PAIN: ICD-10-CM

## 2023-04-01 DIAGNOSIS — M25.511 CHRONIC RIGHT SHOULDER PAIN: Primary | ICD-10-CM

## 2023-04-01 NOTE — PATIENT INSTRUCTIONS
I want you to try outpatient physical therapy  2 Aleve twice a day    If no improvement in 2 to 3 weeks she should see an orthopedic surgeon and get a possible MRI

## 2023-04-01 NOTE — PROGRESS NOTES
[facility]        NAME: Jackie Silverio is a 52 y o  male  : 1970    MRN: [id number]  DATE: 2023  TIME: 8:39 AM    Assessment and Plan   Chronic right shoulder pain [M25 511, G89 29]  1  Chronic right shoulder pain  XR shoulder 2+ vw right            Patient Instructions       Follow up with PCP in 3-5 days  Proceed to  ER if symptoms worsen  Chief Complaint     Chief Complaint   Patient presents with   • Back Pain     Pt presents with back pain and right shoulder pain x 1 month  PT states he fell in the shower x 5 months ago  History of Present Illness       He fell several months ago  Since that time he has a chronic pain in his right shoulder seemingly unrelieved with Motrin  There is no weakness in the right shoulder  Numbness or tingling into the right shoulder  Review of Systems   Review of Systems   Musculoskeletal: Positive for arthralgias  Current Medications       Current Outpatient Medications:   •  ALPRAZolam (XANAX) 0 25 mg tablet, TAKE 1 TABLET (0 25 MG TOTAL) BY MOUTH 3 (THREE) TIMES A DAY AS NEEDED FOR ANXIETY , Disp: 30 tablet, Rfl: 0  •  amLODIPine (NORVASC) 5 mg tablet, TAKE 1 TABLET (5 MG TOTAL) BY MOUTH IN THE MORNING , Disp: 90 tablet, Rfl: 1  •  aspirin 81 MG tablet, Take 81 mg by mouth daily, Disp: , Rfl:   •  Blood Glucose Monitoring Suppl (ACCU-CHEK JESSY CONNECT) w/Device KIT, by Does not apply route, Disp: , Rfl:   •  Cholecalciferol (VITAMIN D3) 2000 UNITS TABS, Take 1 tablet by mouth daily, Disp: , Rfl:   •  gabapentin (NEURONTIN) 300 mg capsule, TAKE 1 CAPSULE BY MOUTH THREE TIMES A DAY, Disp: 90 capsule, Rfl: 5  •  glimepiride (AMARYL) 4 mg tablet, TAKE 1 TABLET BY MOUTH IN THE MORNING AND 1 TABLET IN THE EVENING   TAKE WITH MEALS , Disp: 180 tablet, Rfl: 0  •  Janumet  MG per tablet, TAKE 2 TABLETS BY MOUTH EVERY DAY, Disp: 180 tablet, Rfl: 1  •  Jardiance 10 MG TABS tablet, TAKE 1 TABLET BY MOUTH EVERY MORNING , Disp: 30 tablet, Rfl: 6  •  Omega-3 Fatty Acids (FISH OIL) 1,000 mg, Take 1 capsule by mouth daily, Disp: , Rfl:   •  sertraline (ZOLOFT) 100 mg tablet, TAKE 2 TABLETS BY MOUTH DAILY, Disp: 180 tablet, Rfl: 1  •  sildenafil (REVATIO) 20 mg tablet, TAKE 3 TABLETS BY MOUTH DAILY AS NEEDED FOR ERECTILE DYSFUNCTION  , Disp: 90 tablet, Rfl: 0  •  simvastatin (ZOCOR) 40 mg tablet, TAKE 1 TABLET BY MOUTH EVERYDAY AT BEDTIME, Disp: 90 tablet, Rfl: 0  •  tamsulosin (FLOMAX) 0 4 mg, TAKE 1 CAPSULE BY MOUTH EVERY DAY WITH DINNER, Disp: 90 capsule, Rfl: 1  •  valsartan-hydrochlorothiazide (DIOVAN-HCT) 320-12 5 MG per tablet, TAKE 1 TABLET BY MOUTH EVERY DAY, Disp: 90 tablet, Rfl: 1    Current Allergies     Allergies as of 04/01/2023   • (No Known Allergies)            The following portions of the patient's history were reviewed and updated as appropriate: allergies, current medications, past family history, past medical history, past social history, past surgical history and problem list      Past Medical History:   Diagnosis Date   • Anxiety    • COVID-19 10/28/2022   • Diabetes mellitus ([facility] [address] )    • Hyperlipidemia    • Hypertension    • Kidney stone    • Psychiatric disorder     Anxiety       Past Surgical History:   Procedure Laterality Date   • EXTRACORPOREAL SHOCK WAVE LITHOTRIPSY     • KNEE ARTHROSCOPY Left     torn meniscus   • [place] CYSTO/URETERO W/LITHOTRIPSY &INDWELL STENT INSRT Left 5/23/2017    Procedure: CYSTOSCOPY; URETEROSCOPY; HOLMIUM LASER; RETROGRADE PYELOGRAM; STENT PLACEMENT ;  Surgeon: Eilleen Skiff, MD;  [facility];  Service: Urology   • [place] LITHOTRIPSY XTRCORP SHOCK WAVE Left 5/18/2017    Procedure: ESWL ;  Surgeon: Gary Hayward MD;  [facility];  Service: Urology       Family History   Problem Relation Age of Onset   • Cancer Maternal Grandmother    • Breast cancer Maternal Grandmother    • Diabetes unspecified Mother         prediabetes   • Thyroid disease unspecified Mother    • Diabetes "Father    • Hypertension Father    • Hyperlipidemia Father    • Diabetes type II Father         toe amputation   • No Known Problems Sister    • Alcohol abuse Brother    • Polycystic ovary syndrome Daughter         possibly, on metformin after weight gain   • No Known Problems Daughter          Medications have been verified  Objective   /98   Pulse 98   Temp 97 7 °F (36 5 °C)   Resp 18   Ht 5' 11\" (1 803 m)   Wt 112 kg (248 lb)   SpO2 98%   BMI 34 59 kg/m²   No LMP for male patient  Physical Exam     Physical Exam  Musculoskeletal:      Comments: He can abduct the right shoulder 180 degrees and forward flex 180 degrees  He can reach over the back and up the back seemingly without limitation  No weakness of the right upper extremity  There is some point tenderness at the insertion of the biceps tendon into the shoulder           X-ray shows no acute changes        "

## 2023-04-04 ENCOUNTER — OFFICE VISIT (OUTPATIENT)
Dept: PHYSICAL THERAPY | Facility: CLINIC | Age: 53
End: 2023-04-04

## 2023-04-04 DIAGNOSIS — G89.29 CHRONIC RIGHT SHOULDER PAIN: ICD-10-CM

## 2023-04-04 DIAGNOSIS — M25.511 CHRONIC RIGHT SHOULDER PAIN: ICD-10-CM

## 2023-04-04 DIAGNOSIS — S43.101A SEPARATION OF RIGHT ACROMIOCLAVICULAR JOINT, INITIAL ENCOUNTER: Primary | ICD-10-CM

## 2023-04-04 NOTE — PROGRESS NOTES
PT Evaluation     Today's date: 2023  Patient name: Jordon Singh  : 1970  MRN: [id number]  Referring provider: Gabriel Zavala PT  Dx:   Encounter Diagnosis     ICD-10-CM    1  Separation of right acromioclavicular joint, initial encounter  S43 101A       2  Chronic right shoulder pain  M25 511     G89 29                      Assessment  Assessment details: Nazario Alvarez is a 51 yo male with right AC joint separation and shoulder pain presenting with postural deficits, poor scapular stability, decreased strength, and pain with function  He is an excellent candidate for OPPT to address the above impairments and optimize functional use of right arm  Functional limitations: interrupted sleep, pain with reaching over shoulder heightBarriers to therapy: High deductible    Goals  6 weeks  1  Independent with HEP at discharge  2  Tolerate sleeping 6-8 hours uninterrupted by pain to allow resuming PLOF  3  Normalize R shoulder strength throughout to allow full functional use of RUE  4  Achieve full pain free AROM R shoulder at all planes to allow full functional use of RUE  Plan  Plan details: Provided with and reviewed initial written HEP  Reviewed physical exam findings and plan of care  All questions answered to patient's satisfaction  Patient would benefit from: PT eval and skilled physical therapy  Planned modality interventions: low level laser therapy  Other planned modality interventions: EPAT  Planned therapy interventions: manual therapy, neuromuscular re-education, patient education, therapeutic activities, therapeutic exercise and home exercise program  Frequency: 1x week  Duration in weeks: 6  Treatment plan discussed with: patient        Subjective Evaluation    History of Present Illness  Mechanism of injury: Pt reports he fell backwards out of a bathtub and hit his thoracic spine on a bracket  He has been having right anterior shoulder pain ever since   Occasionally the pain will spread across his back to his left shoulder  He went to urgent care and had an xray revealing a right AC joint separation and high riding humerus  Denies neck pain, B/B changes, recent fevers, or numbness/tingling  He arrives via self referral for OPPT evaluation  PMH significant for DM II, HTN    Sleeps on his stomach with right arm under his head  Pain  Current pain rating: 3  At worst pain ratin  Location: R anterior shoulder  Aggravating factors: overhead activity  Progression: worsening    Social Support    Employment status: working (desk job)  Exercise history: walking      Diagnostic Tests  X-ray: abnormal  Treatments  Treatments tried: has tried ibuprofen and aleve with no improvement  Patient Goals  Patient goals for therapy: decreased pain          Objective     Postural Observations  Seated posture: poor  Standing posture: poor  Correction of posture: has no consistent effect    Additional Postural Observation Details  Forward head, rounded shoulders, increased thoracic kyphosis    Cervical/Thoracic Screen   Cervical range of motion within normal limits with the following exceptions: Restricted SB, extension and retraction but no reproduction or change in symptoms  Thoracic range of motion within normal limits with the following exceptions: Hypomobile with restricted extension    Active Range of Motion     Right Shoulder   Flexion: 180 degrees with pain  Abduction: 180 degrees with pain  External rotation BTH: T1 with pain  Internal rotation BTB: upper thoracic with pain    Strength/Myotome Testing     Left Shoulder     Planes of Motion   Flexion: 5   Abduction: 5   External rotation at 0°: 5   Internal rotation at 0°: 5     Right Shoulder     Planes of Motion   Flexion: 4   Abduction: 4   External rotation at 0°: 4+   Internal rotation at 0°: 5     Isolated Muscles   Lower trapezius: 4   Middle trapezius: 4     Tests     Right Shoulder   Positive AC shear, crossover, Hawkin's and painful arc  Negative empty can  HEP provided as follows:  Access Code: [email or web address]  URL: [email or web address]  Date: 04/04/2023  Prepared by: Danielle Mendez    Exercises  - Shoulder External Rotation with Anchored Resistance  - 1 x daily - 7 x weekly - 3 sets - 10 reps  - Shoulder extension with resistance - Neutral  - 1 x daily - 7 x weekly - 3 sets - 10 reps  - Standing Row with Anchored Resistance  - 1 x daily - 7 x weekly - 3 sets - 10 reps  - Standing Row with Resistance with Anchored Resistance at Chest Height Palms Down  - 1 x daily - 7 x weekly - 3 sets - 10 reps  - Prone Single Arm Shoulder Y  - 1 x daily - 7 x weekly - 3 sets - 10 reps  - Prone Shoulder Horizontal Abduction  - 1 x daily - 7 x weekly - 3 sets - 10 reps  - Prone Shoulder Extension - Single Arm  - 1 x daily - 7 x weekly - 3 sets - 10 reps  - Seated Thoracic Lumbar Extension  - 1 x daily - 7 x weekly - 10 reps - 10 hold         Precautions: HTN, DM II      Manuals 4/4            EPAT R AC #1 1 0 barr DI15            Post, Ant GH mobs                                       Neuro Re-Ed             Posture tband *            T/S ext over chair *            Thoracic foam roller protocol                                                                 Ther Ex             ER w/ tband *            Prone UL YT ext *                                                                             Pt ed HEP rev, POC KT            Ther Activity             Bwd UBE                          Gait Training                                       Modalities

## 2023-04-06 ENCOUNTER — APPOINTMENT (OUTPATIENT)
Dept: PHYSICAL THERAPY | Facility: CLINIC | Age: 53
End: 2023-04-06

## 2023-04-06 NOTE — PROGRESS NOTES
"Assessment/Plan:    Paresthesia of both feet  -     Encouraged good blood sugar management to minimize progression of diabetic neuropathy   - Proper shoe gear selection reviewed and encouraged  - Follow-up in 1 year  Type 2 diabetes mellitus with hyperglycemia, without long-term current use of insulin ([facility] [address] )  -     Ambulatory referral to Podiatry    Diabetic peripheral neuropathy [facility])  -     Ambulatory referral to Podiatry        Subjective:      Patient ID: Иван Aldrich is a 52 y o  male  3/30/2023: 57-year-old type II diabetic presents for diabetic foot exam, reports burning sensation distal forefoot bilateral   Denies any acute injury  The following portions of the patient's history were reviewed and updated as appropriate: allergies, current medications, past family history, past medical history, past social history, past surgical history and problem list     Review of Systems   Constitutional: Negative for chills and fever  HENT: Negative for ear pain and sore throat  Eyes: Negative for pain and visual disturbance  Respiratory: Negative for cough and shortness of breath  Cardiovascular: Negative for chest pain and palpitations  Gastrointestinal: Negative for abdominal pain and vomiting  Endocrine:        Type 2 diabetes   Genitourinary: Negative for dysuria and hematuria  Musculoskeletal: Negative for arthralgias and back pain  Skin: Negative for color change and rash  Neurological: Negative for seizures and syncope  Reports burning sensation distal forefoot bilateral   All other systems reviewed and are negative  Objective:      /93   Pulse (!) 112   Ht 5' 11\" (1 803 m)   Wt 111 kg (245 lb)   BMI 34 17 kg/m²          Physical Exam  Cardiovascular:      Pulses: no weak pulses          Dorsalis pedis pulses are 1+ on the right side and 1+ on the left side  Posterior tibial pulses are 2+ on the right side and 2+ on the left side     Musculoskeletal:    " General: Normal range of motion  Feet:      Right foot:      Skin integrity: Skin integrity normal  No ulcer, skin breakdown, erythema, warmth, callus or dry skin  Toenail Condition: Right toenails are normal       Left foot:      Skin integrity: Skin integrity normal  No ulcer, skin breakdown, erythema, warmth, callus or dry skin  Toenail Condition: Left toenails are normal    Skin:     Comments: Texture tone and turgor are within normal limits, digital hair noted  No lesions or ulcerations noted  Neurological:      General: No focal deficit present  Mental Status: He is oriented to person, place, and time  Comments: Burning sensation distal forefoot bilateral            Diabetic Foot Exam    Patient's shoes and socks removed  Right Foot/Ankle   Right Foot Inspection  Skin Exam: skin normal and skin intact  No dry skin, no warmth, no callus, no erythema, no maceration, no abnormal color, no pre-ulcer, no ulcer and no callus  Toe Exam: ROM and strength within normal limits  Sensory   Vibration: intact  Proprioception: intact  Monofilament testing: intact    Vascular  Capillary refills: < 3 seconds  The right DP pulse is 1+  The right PT pulse is 2+  Left Foot/Ankle  Left Foot Inspection  Skin Exam: skin normal and skin intact  No dry skin, no warmth, no erythema, no maceration, normal color, no pre-ulcer, no ulcer and no callus  Toe Exam: ROM and strength within normal limits  Sensory   Vibration: intact  Proprioception: intact  Monofilament testing: intact    Vascular  Capillary refills: < 3 seconds  The left DP pulse is 1+  The left PT pulse is 2+       Assign Risk Category  No deformity present  No loss of protective sensation  No weak pulses  Risk: 0

## 2023-04-06 NOTE — PROGRESS NOTES
"Assessment/Plan:    Type 2 diabetes mellitus with hyperglycemia, without long-term current use of insulin ([facility] [address] )  -     Ambulatory referral to Podiatry    Diabetic peripheral neuropathy [facility])  -     Ambulatory referral to Podiatry        Subjective:      Patient ID: Steven Joseph is a 52 y o  male  52year-old type II diabetic presents for evaluation of bilateral feet reports burning sensations in the tips of toes both feet  Denies any acute pedal injuries        {Common ambulatory SmartLinks:42918}    Review of Systems      Objective:      /93   Pulse (!) 112   Ht 5' 11\" (1 803 m)   Wt 111 kg (245 lb)   BMI 34 17 kg/m²          Physical Exam    "

## 2023-04-10 ENCOUNTER — APPOINTMENT (OUTPATIENT)
Dept: PHYSICAL THERAPY | Facility: CLINIC | Age: 53
End: 2023-04-10

## 2023-04-13 ENCOUNTER — APPOINTMENT (OUTPATIENT)
Dept: PHYSICAL THERAPY | Facility: CLINIC | Age: 53
End: 2023-04-13

## 2023-04-25 ENCOUNTER — OFFICE VISIT (OUTPATIENT)
Dept: PHYSICAL THERAPY | Facility: CLINIC | Age: 53
End: 2023-04-25

## 2023-04-25 DIAGNOSIS — M25.511 CHRONIC RIGHT SHOULDER PAIN: ICD-10-CM

## 2023-04-25 DIAGNOSIS — G89.29 CHRONIC RIGHT SHOULDER PAIN: ICD-10-CM

## 2023-04-25 DIAGNOSIS — S43.101A SEPARATION OF RIGHT ACROMIOCLAVICULAR JOINT, INITIAL ENCOUNTER: Primary | ICD-10-CM

## 2023-04-25 NOTE — PROGRESS NOTES
"Daily Note     Today's date: 2023  Patient name: Lupita Crigler  : 1970  MRN: [id number]  Referring provider: Darwin Wellington, PT  Dx:   Encounter Diagnosis     ICD-10-CM    1  Separation of right acromioclavicular joint, initial encounter  S43 101A       2  Chronic right shoulder pain  M25 511     G89 29                      Subjective: Pt reports the tape stayed on for 2-3 days after last visit and was helpful  Objective: See treatment diary below      Assessment: Tolerated treatment well  Patient exhibited good technique with therapeutic exercises and would benefit from continued PT      Plan: Continue per plan of care        Precautions: HTN, DM II      Manuals          EPAT R AC #1 1 0 barr DI15 #2 1 5 barr #3 2 5 barr #4 3 0 barr          Post, Ant GH mobs  KT KT KT         RockTape AC jt mechanical correction   3 I strips KT 3 I strips KT                      Neuro Re-Ed             Posture tband * 2x15 ea grn 30x ea grn 2x15 ea blue         T/S ext over chair * 10\"x10 10\"x10 10\"x10         Thoracic foam roller protocol  15x ea 20x ea 20x ea                                                             Ther Ex             ER w/ tband *            Prone UL YT ext * 15x ea 20x ea 20x ea 1#                                                                          Pt ed HEP rev, POC KT HEP rev KT           Ther Activity             Bwd UBE  8' 8' NV                      Gait Training                                       Modalities                                            "

## 2023-04-27 ENCOUNTER — HOSPITAL ENCOUNTER (OUTPATIENT)
Dept: SLEEP CENTER | Facility: HOSPITAL | Age: 53
Discharge: HOME | End: 2023-04-27

## 2023-04-27 DIAGNOSIS — G47.33 OSA (OBSTRUCTIVE SLEEP APNEA): ICD-10-CM

## 2023-04-28 DIAGNOSIS — G47.33 OSA (OBSTRUCTIVE SLEEP APNEA): Primary | ICD-10-CM

## 2023-04-28 NOTE — PROGRESS NOTES
Sleep Study Documentation    Pre-Sleep Study       Sleep testing procedure explained to patient:YES    Patient napped prior to study:NO    Caffeine:Dayshift worker after 12PM   Caffeine use: yes, coffee 6 to 18 ounces, soda 12 ounces    Alcohol:Dayshift workers after 5PM: Alcohol use:NO    Typical day for patient:YES       Study Documentation    Sleep Study Indications: TRACEY-diagnosed in-lab study    Sleep Study: Treatment   Optimal PAP pressure: 11 cm H2O  Leak:Small  Snore:Eliminated  REM Obtained:yes  Supplemental O2: no    Minimum SaO2 87%  Baseline SaO2 96%  PAP mask tried (list all) medium ResMed AirFit F20  PAP mask choice (final) medium ResMed AirFit F20  PAP mask type:full face  PAP pressure at which snoring was eliminated 11 cm H2O  Minimum SaO2 at final PAP pressure 87%  Mode of Therapy:CPAP  ETCO2:No  CPAP changed to BiPAP:No    Mode of Therapy:CPAP    EKG abnormalities: yes:  EPOCH example and comments: PVCs epoch 465, 908, etc    EEG abnormalities: no    Sleep Study Recorded < 2 hours: N/A    Sleep Study Recorded > 2 hours but incomplete study: N/A    Sleep Study Recorded 6 hours but no sleep obtained: NO    Patient classification: employed       Post-Sleep Study    Medication used at bedtime or during sleep study:YES other prescription medications    Patient reports time it took to fall asleep:greater than 60 minutes    Patient reports waking up during study:1 to 2 times  Patient reports returning to sleep without difficulty  Patient reports sleeping 4 to 6 hours without dreaming  Patient reports sleep during study:better than usual    Patient rated sleepiness: Somewhat sleepy or tired    PAP treatment:yes: Post PAP treatment patient reports feeling unchanged and would wear PAP mask at home

## 2023-05-02 ENCOUNTER — OFFICE VISIT (OUTPATIENT)
Dept: PHYSICAL THERAPY | Facility: CLINIC | Age: 53
End: 2023-05-02

## 2023-05-02 DIAGNOSIS — G89.29 CHRONIC RIGHT SHOULDER PAIN: ICD-10-CM

## 2023-05-02 DIAGNOSIS — S43.101A SEPARATION OF RIGHT ACROMIOCLAVICULAR JOINT, INITIAL ENCOUNTER: Primary | ICD-10-CM

## 2023-05-02 DIAGNOSIS — M25.511 CHRONIC RIGHT SHOULDER PAIN: ICD-10-CM

## 2023-05-02 NOTE — PROGRESS NOTES
"Daily Note     Today's date: 2023  Patient name: Cally Nuñez  : 1970  MRN: [id number]  Referring provider: Alyson Meza, PT  Dx:   Encounter Diagnosis     ICD-10-CM    1  Separation of right acromioclavicular joint, initial encounter  S43 101A       2  Chronic right shoulder pain  M25 511     G89 29                      Subjective: Pt requested to shorten session today due to having a work meeting  He reports 75% improvement since beginning PT  Objective: See treatment diary below      Assessment: Tolerated treatment well  Reviewed HEP and discharge plan  Plan: Discharge next visit        Precautions: HTN, DM II      Manuals  5        EPAT R AC #1 1 0 barr DI15 #2 1 5 barr #3 2 5 barr #4 3 0 barr  #5 4 0 virgen        Post, Ant GH mobs  KT KT KT         RockTape AC jt mechanical correction   3 I strips KT 3 I strips KT 3 I strips KT                     Neuro Re-Ed             Posture tband * 2x15 ea grn 30x ea grn 2x15 ea blue         T/S ext over chair * 10\"x10 10\"x10 10\"x10         Thoracic foam roller protocol  15x ea 20x ea 20x ea                                                             Ther Ex             ER w/ tband *            Prone UL YT ext * 15x ea 20x ea 20x ea 1#                                                                          Pt ed HEP rev, POC KT HEP rev KT   HEP rev KT        Ther Activity             Bwd UBE  8' 8' NV                      Gait Training                                       Modalities                                            "

## 2023-05-05 ENCOUNTER — TELEPHONE (OUTPATIENT)
Dept: SLEEP CENTER | Facility: CLINIC | Age: 53
End: 2023-05-05

## 2023-05-05 NOTE — TELEPHONE ENCOUNTER
Call placed to patient  Left message CPAP titration study is resulted and to call the nursing staff to review the results     APAP ordered  Patient needs DME setup and compliance follow-up

## 2023-05-08 NOTE — TELEPHONE ENCOUNTER
Returned call from patient  Advised CPAP tiration study is resulted and APAP ordered  DME setup scheduled 5/22/2023 in [facility]  Compliance follow-up 7/18/2023 with Dr So Thomas in [facility]  Rx for CPAP and clinicals sent to [facility] via [place]

## 2023-05-09 ENCOUNTER — OFFICE VISIT (OUTPATIENT)
Dept: PHYSICAL THERAPY | Facility: CLINIC | Age: 53
End: 2023-05-09

## 2023-05-09 DIAGNOSIS — S43.101A SEPARATION OF RIGHT ACROMIOCLAVICULAR JOINT, INITIAL ENCOUNTER: ICD-10-CM

## 2023-05-09 DIAGNOSIS — M25.511 CHRONIC RIGHT SHOULDER PAIN: Primary | ICD-10-CM

## 2023-05-09 DIAGNOSIS — G89.29 CHRONIC RIGHT SHOULDER PAIN: Primary | ICD-10-CM

## 2023-05-09 NOTE — PROGRESS NOTES
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 and his shoulder felt pretty good  Objective: See treatment diary below      Assessment: Tolerated treatment well   Patient demonstrated fatigue post treatment and exhibited good technique with therapeutic exercises      Plan: Follow up in 2 weeks for reassessment     Precautions: HTN, DM II      Manuals        EPAT R AC #1 1 0 barr DI15 #2 1 5 barr #3 2 5 barr #4 3 0 barr  #5 4 0 barr #6 4 0 virgen       Post, Ant GH mobs  KT KT KT         RockTape AC jt mechanical correction   3 I strips KT 3 I strips KT 3 I strips KT 3 I strips KT                    Neuro Re-Ed             Posture tband * 2x15 ea grn 30x ea grn 2x15 ea blue  2x15 ea blue       T/S ext over chair * 10\"x10 10\"x10 10\"x10  10\"x10       Thoracic foam roller protocol  15x ea 20x ea 20x ea  30x ea                                                           Ther Ex             ER w/ tband *            Prone UL YT ext * 15x ea 20x ea 20x ea 1#                                                                          Pt ed HEP rev, POC KT HEP rev KT   HEP rev KT HEP rev KT       Ther Activity             Bwd UBE  8' 8' NV                      Gait Training                                       Modalities                                            "

## 2023-05-10 LAB

## 2023-05-18 ENCOUNTER — HOSPITAL ENCOUNTER (EMERGENCY)
Facility: HOSPITAL | Age: 53
Discharge: HOME | End: 2023-05-18
Attending: EMERGENCY MEDICINE

## 2023-05-18 VITALS
SYSTOLIC BLOOD PRESSURE: 161 MMHG | WEIGHT: 248 LBS | DIASTOLIC BLOOD PRESSURE: 93 MMHG | RESPIRATION RATE: 18 BRPM | OXYGEN SATURATION: 95 % | HEART RATE: 98 BPM | HEIGHT: 71 IN | BODY MASS INDEX: 34.72 KG/M2 | TEMPERATURE: 97.9 F
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
## 2023-05-18 DIAGNOSIS — S01.511A LIP LACERATION, INITIAL ENCOUNTER: ICD-10-CM

## 2023-05-18 RX ORDER — LIDOCAINE HYDROCHLORIDE AND EPINEPHRINE 10; 10 MG/ML; UG/ML
1 INJECTION, SOLUTION INFILTRATION; PERINEURAL ONCE
Status: COMPLETED | OUTPATIENT
Start: 2023-05-18 | End: 2023-05-18

## 2023-05-18 RX ORDER — AMOXICILLIN AND CLAVULANATE POTASSIUM 875; 125 MG/1; MG/1
1 TABLET, FILM COATED ORAL EVERY 12 HOURS
Qty: 20 TABLET | Refills: 0 | Status: SHIPPED | OUTPATIENT
Start: 2023-05-18 | End: 2023-05-28

## 2023-05-18 RX ORDER — AMOXICILLIN AND CLAVULANATE POTASSIUM 875; 125 MG/1; MG/1
1 TABLET, FILM COATED ORAL ONCE
Status: COMPLETED | OUTPATIENT
Start: 2023-05-18 | End: 2023-05-18

## 2023-05-18 RX ADMIN — TETANUS TOXOID, REDUCED DIPHTHERIA TOXOID AND ACELLULAR PERTUSSIS VACCINE, ADSORBED 0.5 ML: 5; 2.5; 8; 8; 2.5 SUSPENSION INTRAMUSCULAR at 21:52

## 2023-05-18 RX ADMIN — AMOXICILLIN AND CLAVULANATE POTASSIUM 1 TABLET: 875; 125 TABLET, FILM COATED ORAL at 21:52

## 2023-05-18 RX ADMIN — LIDOCAINE HYDROCHLORIDE,EPINEPHRINE BITARTRATE 1 ML: 10; .01 INJECTION, SOLUTION INFILTRATION; PERINEURAL at 21:52

## 2023-05-19 NOTE — DISCHARGE INSTRUCTIONS
Take augmentin every 12 hours for the next 10 days   Keep stitches dry for the next 2 days  After 2 days you may wash with soap and water  Do not apply antibiotic ointment  Be careful not to pull stiches out when cleaning  Stitches need to be removed in 5 days   Can be done by PCP, urgent care or come back to ER  Return to the ER if your stitches fall out and the area keeps rebleeding despite pressure > 10 min, redness, warmth, swelling, red streaking, purulent discharge, fevers, chest pain, shortness of breath

## 2023-05-19 NOTE — ED PROVIDER NOTES
History  Chief Complaint   Patient presents with   • Dog Bite     Dog bite to left upper lip by own dog  Unknown last tetanus vaccine  States dog is UTD w/ immunization  This is a 51 Y/o male with PMH HTN HLD T2DM who presents to the ER with dog bite to upper lip  Patient states PTA he was playing with his daughters dog when it bit him around 2000  He states it was bleeding a lot after but was easily stopped with pressure  Denies any numbness or tingling  Dog UTD on all vaccines  Patient unsure of last tetanus  Not on any blood thinners but does take baby ASA daily  No known allergies  History provided by:  Patient   used: No    Dog Bite  Contact animal:  Dog  Location:  Mouth  Mouth injury location:  Upper outer lip  Time since incident:  2 hours  Incident location:  Another residence  Provoked: unprovoked    Animal's rabies vaccination status:  Up to date  Animal in possession: yes    Tetanus status:  Unknown  Associated symptoms: no fever, no numbness and no swelling        Prior to Admission Medications   Prescriptions Last Dose Informant Patient Reported? Taking? ALPRAZolam (XANAX) 0 25 mg tablet   No No   Sig: TAKE 1 TABLET (0 25 MG TOTAL) BY MOUTH 3 (THREE) TIMES A DAY AS NEEDED FOR ANXIETY  Blood Glucose Monitoring Suppl (ACCU-CHEK JESSY CONNECT) w/Device KIT   Yes No   Sig: by Does not apply route   Cholecalciferol (VITAMIN D3) 2000 UNITS TABS   Yes No   Sig: Take 1 tablet by mouth daily   Janumet  MG per tablet   No No   Sig: TAKE 2 TABLETS BY MOUTH EVERY DAY   Jardiance 10 MG TABS tablet   No No   Sig: TAKE 1 TABLET BY MOUTH EVERY MORNING  Omega-3 Fatty Acids (FISH OIL) 1,000 mg   Yes No   Sig: Take 1 capsule by mouth daily   amLODIPine (NORVASC) 5 mg tablet   No No   Sig: TAKE 1 TABLET (5 MG TOTAL) BY MOUTH IN THE MORNING     aspirin 81 MG tablet   Yes No   Sig: Take 81 mg by mouth daily   gabapentin (NEURONTIN) 300 mg capsule   No No   Sig: TAKE 1 CAPSULE BY MOUTH THREE TIMES A DAY   glimepiride (AMARYL) 4 mg tablet   No No   Sig: TAKE 1 TABLET BY MOUTH IN THE MORNING AND 1 TABLET IN THE EVENING  TAKE WITH MEALS  sertraline (ZOLOFT) 100 mg tablet   No No   Sig: TAKE 2 TABLETS BY MOUTH DAILY   sildenafil (REVATIO) 20 mg tablet   No No   Sig: TAKE 3 TABLETS BY MOUTH DAILY AS NEEDED FOR ERECTILE DYSFUNCTION     simvastatin (ZOCOR) 40 mg tablet   No No   Sig: TAKE 1 TABLET BY MOUTH EVERYDAY AT BEDTIME   tamsulosin (FLOMAX) 0 4 mg   No No   Sig: TAKE 1 CAPSULE BY MOUTH EVERY DAY WITH DINNER   valsartan-hydrochlorothiazide (DIOVAN-HCT) 320-12 5 MG per tablet   No No   Sig: TAKE 1 TABLET BY MOUTH EVERY DAY      Facility-Administered Medications: None       Past Medical History:   Diagnosis Date   • Anxiety    • COVID-19 10/28/2022   • Diabetes mellitus ([facility] [address] )    • Hyperlipidemia    • Hypertension    • Kidney stone    • Psychiatric disorder     Anxiety       Past Surgical History:   Procedure Laterality Date   • EXTRACORPOREAL SHOCK WAVE LITHOTRIPSY     • KNEE ARTHROSCOPY Left     torn meniscus   • [place] CYSTO/URETERO W/LITHOTRIPSY &INDWELL STENT INSRT Left 5/23/2017    Procedure: CYSTOSCOPY; URETEROSCOPY; HOLMIUM LASER; RETROGRADE PYELOGRAM; STENT PLACEMENT ;  Surgeon: Cathryn Pgua MD;  [facility];  Service: Urology   • WA LITHOTRIPSY XTRCORP SHOCK WAVE Left 5/18/2017    Procedure: ESWL ;  Surgeon: Jojo Paniagua MD;  [facility];  Service: Urology       Family History   Problem Relation Age of Onset   • Cancer Maternal Grandmother    • Breast cancer Maternal Grandmother    • Diabetes unspecified Mother         prediabetes   • Thyroid disease unspecified Mother    • Diabetes Father    • Hypertension Father    • Hyperlipidemia Father    • Diabetes type II Father         toe amputation   • No Known Problems Sister    • Alcohol abuse Brother    • Polycystic ovary syndrome Daughter         possibly, on metformin after weight gain   • No Known Problems Daughter      I have reviewed and agree with the history as documented  E-Cigarette/Vaping   • E-Cigarette Use Never User      E-Cigarette/Vaping Substances   • Nicotine No    • THC No    • CBD No    • Flavoring No    • Other No    • Unknown No      Social History     Tobacco Use   • Smoking status: Never   • Smokeless tobacco: Never   Vaping Use   • Vaping Use: Never used   Substance Use Topics   • Alcohol use: Yes     Alcohol/week: 5 0 standard drinks     Types: 5 Standard drinks or equivalent per week     Comment: SOCIAL- 3-4 drinks a week with a meal   • Drug use: No       Review of Systems   Constitutional: Negative for fever  Skin: Positive for wound  Negative for color change  Neurological: Negative for weakness and numbness  Psychiatric/Behavioral: Negative for behavioral problems  Physical Exam  Physical Exam  Vitals and nursing note reviewed  Constitutional:       General: He is awake  Appearance: Normal appearance  He is well-developed  HENT:      Head: Normocephalic and atraumatic  Right Ear: External ear normal       Left Ear: External ear normal       Nose: Nose normal    Eyes:      General: No scleral icterus  Extraocular Movements: Extraocular movements intact  Musculoskeletal:         General: Normal range of motion  Cervical back: Normal range of motion  Skin:     General: Skin is warm and dry  Findings: Laceration present  Comments: 2 cm laceration of left upper outer lip through the vermillion border  Gaping  Underlying tissue exposed  No lac in mucus membrane  Bleeding controlled  No visible foreign body   Neurological:      General: No focal deficit present  Mental Status: He is alert  Psychiatric:         Attention and Perception: Attention and perception normal          Mood and Affect: Mood normal          Behavior: Behavior normal  Behavior is cooperative           Vital Signs  ED Triage Vitals [05/18/23 2114]   Temperature Pulse Respirations Blood Pressure SpO2   97 9 °F (36 6 °C) 98 18 161/93 95 %      Temp Source Heart Rate Source Patient Position - Orthostatic VS BP Location FiO2 (%)   Temporal Monitor Sitting Right arm --      Pain Score       No Pain           Vitals:    05/18/23 2114   BP: 161/93   Pulse: 98   Patient Position - Orthostatic VS: Sitting         Visual Acuity      ED Medications  Medications   tetanus-diphtheria-acellular pertussis (BOOSTRIX) IM injection 0 5 mL (0 5 mL Intramuscular Given 5/18/23 2152)   amoxicillin-clavulanate (AUGMENTIN) 875-125 mg per tablet 1 tablet (1 tablet Oral Given 5/18/23 2152)   lidocaine-epinephrine (XYLOCAINE/EPINEPHRINE) 1 %-1:100,000 injection 1 mL (1 mL Infiltration Given by Other 5/18/23 2152)       Diagnostic Studies  Results Reviewed     None                 No orders to display              Procedures  Laceration repair    Date/Time: 5/18/2023 10:46 PM  Performed by: Nisha Owens PA-C  Authorized by: Nisha Owens PA-C   Consent: Verbal consent obtained  Risks and benefits: risks, benefits and alternatives were discussed  Consent given by: patient  Required items: required blood products, implants, devices, and special equipment available  Patient identity confirmed: verbally with patient  Body area: mouth  Location details: upper lip, interior  Laceration length: 2 cm  Foreign bodies: no foreign bodies  Tendon involvement: none  Nerve involvement: none  Anesthesia: local infiltration    Anesthesia:  Local Anesthetic: lidocaine 1% with epinephrine  Anesthetic total: 2 mL      Procedure Details:  Preparation: Patient was prepped and draped in the usual sterile fashion    Irrigation solution: saline  Irrigation method: syringe  Amount of cleaning: extensive  Skin closure: 6-0 nylon  Number of sutures: 3  Technique: simple  Approximation: close  Approximation difficulty: simple  Patient tolerance: patient tolerated the procedure well with no immediate complications               ED Course                               SBIRT 22yo+    Flowsheet Row Most Recent Value   Initial Alcohol Screen: US AUDIT-C     1  How often do you have a drink containing alcohol? 0 Filed at: 05/18/2023 2116   2  How many drinks containing alcohol do you have on a typical day you are drinking? 0 Filed at: 05/18/2023 2116   3a  Male UNDER 65: How often do you have five or more drinks on one occasion? 0 Filed at: 05/18/2023 2116   3b  FEMALE Any Age, or MALE 65+: How often do you have 4 or more drinks on one occassion? 0 Filed at: 05/18/2023 2116   Audit-C Score 0 Filed at: 05/18/2023 2116   [place]IL[place]: How many times in the past year have you    Used an illegal drug or used a prescription medication for non-medical reasons? Never Filed at: 05/18/2023 2116                    Medical Decision Making  51 y/o male here for laceration from dog bite  Clinical diagnosis  No further studies indicated  Plan: augmentin, tetanus, laceration extensively irrigated and repaired at bedside  augmentin x 10 days  Given suture care/removal instructions  he  was given strict return to ER precautions both verbally and in discharge papers  Patient verbalized understanding and agrees with plan  Risk  Prescription drug management  Disposition  Final diagnoses:   Dog bite, initial encounter   Lip laceration, initial encounter     Time reflects when diagnosis was documented in both MDM as applicable and the Disposition within this note     Time User Action Codes Description Comment    5/18/2023 10:39 PM Nika Terry Abt  0XXA] Dog bite, initial encounter     5/18/2023 10:39 PM Nika Baxter [S95 866E] Lip laceration, initial encounter       ED Disposition     ED Disposition   Discharge    Condition   Stable    Date/Time   Thu May 18, 2023 10:39 PM    Comment   Vanna Mckinney discharge to home/self care                 Follow-up Information    None         Patient's Medications   Discharge Prescriptions    AMOXICILLIN-CLAVULANATE (AUGMENTIN) 875-125 MG PER TABLET    Take 1 tablet by mouth every 12 (twelve) hours for 10 days       Start Date: 5/18/2023 End Date: 5/28/2023       Order Dose: 1 tablet       Quantity: 20 tablet    Refills: 0       No discharge procedures on file      PDMP Review       Value Time User    PDMP Reviewed  Yes 3/30/2023 12:36 PM Tg Ryan DO          ED Provider  Electronically Signed by           Edwinna Leyden, PA-C  05/18/23 5039

## 2023-05-22 LAB

## 2023-05-23 ENCOUNTER — OFFICE VISIT (OUTPATIENT)
Dept: URGENT CARE | Facility: CLINIC | Age: 53
End: 2023-05-23

## 2023-05-23 VITALS
SYSTOLIC BLOOD PRESSURE: 165 MMHG | HEART RATE: 84 BPM | TEMPERATURE: 98 F | DIASTOLIC BLOOD PRESSURE: 97 MMHG | RESPIRATION RATE: 16 BRPM | OXYGEN SATURATION: 96 %

## 2023-05-23 DIAGNOSIS — Z48.02 ENCOUNTER FOR REMOVAL OF SUTURES: Primary | ICD-10-CM

## 2023-05-23 NOTE — PROGRESS NOTES
[facility]        NAME: Dimitri Bello is a 52 y o  male  : 1970    MRN: [id number]  DATE: May 23, 2023  TIME: 5:31 PM    Assessment and Plan   Encounter for removal of sutures [Z48 02]  1  Encounter for removal of sutures              Patient Instructions       Follow up with PCP in 3-5 days  Proceed to  ER if symptoms worsen  Chief Complaint     Chief Complaint   Patient presents with   • Suture / Staple Removal     Pt presents for suture removal from upper lip         History of Present Illness       51 y/o M presents for suture removal for upper lip laceration x 5 days ago  Still taking ABX  3 sutures placed in ED  Review of Systems   Review of Systems   Skin: Positive for wound  Current Medications       Current Outpatient Medications:   •  ALPRAZolam (XANAX) 0 25 mg tablet, TAKE 1 TABLET (0 25 MG TOTAL) BY MOUTH 3 (THREE) TIMES A DAY AS NEEDED FOR ANXIETY , Disp: 30 tablet, Rfl: 0  •  amLODIPine (NORVASC) 5 mg tablet, TAKE 1 TABLET (5 MG TOTAL) BY MOUTH IN THE MORNING , Disp: 90 tablet, Rfl: 1  •  amoxicillin-clavulanate (AUGMENTIN) 875-125 mg per tablet, Take 1 tablet by mouth every 12 (twelve) hours for 10 days, Disp: 20 tablet, Rfl: 0  •  aspirin 81 MG tablet, Take 81 mg by mouth daily, Disp: , Rfl:   •  Blood Glucose Monitoring Suppl (ACCU-CHEK JESSY CONNECT) w/Device KIT, by Does not apply route, Disp: , Rfl:   •  Cholecalciferol (VITAMIN D3) 2000 UNITS TABS, Take 1 tablet by mouth daily, Disp: , Rfl:   •  gabapentin (NEURONTIN) 300 mg capsule, TAKE 1 CAPSULE BY MOUTH THREE TIMES A DAY, Disp: 90 capsule, Rfl: 5  •  glimepiride (AMARYL) 4 mg tablet, TAKE 1 TABLET BY MOUTH IN THE MORNING AND 1 TABLET IN THE EVENING   TAKE WITH MEALS , Disp: 180 tablet, Rfl: 0  •  Janumet  MG per tablet, TAKE 2 TABLETS BY MOUTH EVERY DAY, Disp: 180 tablet, Rfl: 1  •  Jardiance 10 MG TABS tablet, TAKE 1 TABLET BY MOUTH EVERY MORNING , Disp: 30 tablet, Rfl: 6  •  Omega-3 Fatty Acids (FISH OIL) 1,000 mg, Take 1 capsule by mouth daily, Disp: , Rfl:   •  sertraline (ZOLOFT) 100 mg tablet, TAKE 2 TABLETS BY MOUTH DAILY, Disp: 180 tablet, Rfl: 1  •  sildenafil (REVATIO) 20 mg tablet, TAKE 3 TABLETS BY MOUTH DAILY AS NEEDED FOR ERECTILE DYSFUNCTION  , Disp: 90 tablet, Rfl: 0  •  simvastatin (ZOCOR) 40 mg tablet, TAKE 1 TABLET BY MOUTH EVERYDAY AT BEDTIME, Disp: 90 tablet, Rfl: 0  •  tamsulosin (FLOMAX) 0 4 mg, TAKE 1 CAPSULE BY MOUTH EVERY DAY WITH DINNER, Disp: 90 capsule, Rfl: 1  •  valsartan-hydrochlorothiazide (DIOVAN-HCT) 320-12 5 MG per tablet, TAKE 1 TABLET BY MOUTH EVERY DAY, Disp: 90 tablet, Rfl: 1    Current Allergies     Allergies as of 05/23/2023   • (No Known Allergies)            The following portions of the patient's history were reviewed and updated as appropriate: allergies, current medications, past family history, past medical history, past social history, past surgical history and problem list      Past Medical History:   Diagnosis Date   • Anxiety    • COVID-19 10/28/2022   • Diabetes mellitus ([facility] [address] )    • Hyperlipidemia    • Hypertension    • Kidney stone    • Psychiatric disorder     Anxiety       Past Surgical History:   Procedure Laterality Date   • EXTRACORPOREAL SHOCK WAVE LITHOTRIPSY     • KNEE ARTHROSCOPY Left     torn meniscus   • TN CYSTO/URETERO W/LITHOTRIPSY &INDWELL STENT INSRT Left 5/23/2017    Procedure: CYSTOSCOPY; URETEROSCOPY; HOLMIUM LASER; RETROGRADE PYELOGRAM; STENT PLACEMENT ;  Surgeon: Thuy Sprague MD;  [facility];  Service: Urology   • TN LITHOTRIPSY XTRCORP SHOCK WAVE Left 5/18/2017    Procedure: ESWL ;  Surgeon: Jeri Ames MD;  [facility];  Service: Urology       Family History   Problem Relation Age of Onset   • Cancer Maternal Grandmother    • Breast cancer Maternal Grandmother    • Diabetes unspecified Mother         prediabetes   • Thyroid disease unspecified Mother    • Diabetes Father    • Hypertension Father    • Hyperlipidemia Father    • Diabetes type II Father         toe amputation   • No Known Problems Sister    • Alcohol abuse Brother    • Polycystic ovary syndrome Daughter         possibly, on metformin after weight gain   • No Known Problems Daughter          Medications have been verified  Objective   /97   Pulse 84   Temp 98 °F (36 7 °C)   Resp 16   SpO2 96%   No LMP for male patient  Physical Exam     Physical Exam  Vitals and nursing note reviewed  Constitutional:       General: He is not in acute distress  Appearance: He is not toxic-appearing  HENT:      Head: Normocephalic  Comments: 3 sutures removed from L sided upper lip laceration  No signs of infection  Non tender     Nose: Nose normal    Neurological:      Mental Status: He is alert     Psychiatric:         Mood and Affect: Mood normal          Behavior: Behavior normal

## 2023-05-24 ENCOUNTER — APPOINTMENT (OUTPATIENT)
Dept: PHYSICAL THERAPY | Facility: CLINIC | Age: 53
End: 2023-05-24
Payer: COMMERCIAL

## 2023-05-27 DIAGNOSIS — E11.69 TYPE 2 DIABETES MELLITUS WITH OTHER SPECIFIED COMPLICATION, WITHOUT LONG-TERM CURRENT USE OF INSULIN (HCC): ICD-10-CM

## 2023-05-27 DIAGNOSIS — F32.9 REACTIVE DEPRESSION: ICD-10-CM

## 2023-05-27 RX ORDER — SERTRALINE HYDROCHLORIDE 100 MG/1
TABLET, FILM COATED ORAL
Qty: 180 TABLET | Refills: 1 | Status: SHIPPED | OUTPATIENT
Start: 2023-05-27

## 2023-05-30 RX ORDER — GLIMEPIRIDE 4 MG/1
TABLET ORAL
Qty: 180 TABLET | Refills: 0 | Status: SHIPPED | OUTPATIENT
Start: 2023-05-30

## 2023-06-22 ENCOUNTER — APPOINTMENT (OUTPATIENT)
Dept: LAB | Age: 53
End: 2023-06-22
Payer: COMMERCIAL

## 2023-06-22 DIAGNOSIS — E11.65 TYPE 2 DIABETES MELLITUS WITH HYPERGLYCEMIA, WITHOUT LONG-TERM CURRENT USE OF INSULIN (HCC): ICD-10-CM

## 2023-06-22 LAB
ALBUMIN SERPL BCP-MCNC: 3.9 G/DL (ref 3.5–5)
ALP SERPL-CCNC: 77 U/L (ref 46–116)
ALT SERPL W P-5'-P-CCNC: 45 U/L (ref 12–78)
ANION GAP SERPL CALCULATED.3IONS-SCNC: 4 MMOL/L
AST SERPL W P-5'-P-CCNC: 33 U/L (ref 5–45)
BILIRUB SERPL-MCNC: 0.9 MG/DL (ref 0.2–1)
BUN SERPL-MCNC: 25 MG/DL (ref 5–25)
CALCIUM SERPL-MCNC: 10.1 MG/DL (ref 8.3–10.1)
CHLORIDE SERPL-SCNC: 106 MMOL/L (ref 96–108)
CO2 SERPL-SCNC: 30 MMOL/L (ref 21–32)
CREAT SERPL-MCNC: 1.23 MG/DL (ref 0.6–1.3)
CREAT UR-MCNC: 163 MG/DL
EST. AVERAGE GLUCOSE BLD GHB EST-MCNC: 151 MG/DL
GFR SERPL CREATININE-BSD FRML MDRD: 65 ML/MIN/1.73SQ M
GLUCOSE P FAST SERPL-MCNC: 138 MG/DL (ref 65–99)
HBA1C MFR BLD: 6.9 %
MICROALBUMIN UR-MCNC: 333 MG/L (ref 0–20)
MICROALBUMIN/CREAT 24H UR: 204 MG/G CREATININE (ref 0–30)
POTASSIUM SERPL-SCNC: 3.5 MMOL/L (ref 3.5–5.3)
PROT SERPL-MCNC: 7.9 G/DL (ref 6.4–8.4)
SODIUM SERPL-SCNC: 140 MMOL/L (ref 135–147)
TSH SERPL DL<=0.05 MIU/L-ACNC: 0.7 UIU/ML (ref 0.45–4.5)

## 2023-06-22 PROCEDURE — 82570 ASSAY OF URINE CREATININE: CPT

## 2023-06-22 PROCEDURE — 82043 UR ALBUMIN QUANTITATIVE: CPT

## 2023-06-22 PROCEDURE — 83036 HEMOGLOBIN GLYCOSYLATED A1C: CPT

## 2023-06-22 PROCEDURE — 36415 COLL VENOUS BLD VENIPUNCTURE: CPT

## 2023-06-22 PROCEDURE — 84443 ASSAY THYROID STIM HORMONE: CPT

## 2023-06-22 PROCEDURE — 80053 COMPREHEN METABOLIC PANEL: CPT

## 2023-06-24 DIAGNOSIS — I10 ESSENTIAL HYPERTENSION: ICD-10-CM

## 2023-06-24 RX ORDER — VALSARTAN AND HYDROCHLOROTHIAZIDE 320; 12.5 MG/1; MG/1
TABLET, FILM COATED ORAL
Qty: 90 TABLET | Refills: 1 | Status: SHIPPED | OUTPATIENT
Start: 2023-06-24

## 2023-06-25 DIAGNOSIS — E78.5 HYPERLIPIDEMIA, UNSPECIFIED HYPERLIPIDEMIA TYPE: ICD-10-CM

## 2023-06-25 RX ORDER — SIMVASTATIN 40 MG
TABLET ORAL
Qty: 90 TABLET | Refills: 0 | Status: SHIPPED | OUTPATIENT
Start: 2023-06-25

## 2023-06-30 ENCOUNTER — RA CDI HCC (OUTPATIENT)
Dept: OTHER | Facility: HOSPITAL | Age: 53
End: 2023-06-30

## 2023-06-30 NOTE — PROGRESS NOTES
[facility]  coding opportunities          Chart Reviewed number of suggestions sent to Provider: 3   [id number] [phone]  [id number] [id number]  E11 22      Patients Insurance        Commercial Insurance: Mukund Mari

## 2023-07-10 ENCOUNTER — OFFICE VISIT (OUTPATIENT)
Dept: FAMILY MEDICINE CLINIC | Facility: CLINIC | Age: 53
End: 2023-07-10
Payer: COMMERCIAL

## 2023-07-10 ENCOUNTER — OFFICE VISIT (OUTPATIENT)
Dept: ENDOCRINOLOGY | Facility: HOSPITAL | Age: 53
End: 2023-07-10
Payer: COMMERCIAL

## 2023-07-10 VITALS
SYSTOLIC BLOOD PRESSURE: 132 MMHG | BODY MASS INDEX: 35.42 KG/M2 | WEIGHT: 253 LBS | DIASTOLIC BLOOD PRESSURE: 82 MMHG | HEART RATE: 82 BPM | HEIGHT: 71 IN

## 2023-07-10 VITALS
BODY MASS INDEX: 35.5 KG/M2 | HEART RATE: 84 BPM | WEIGHT: 253.6 LBS | HEIGHT: 71 IN | DIASTOLIC BLOOD PRESSURE: 82 MMHG | OXYGEN SATURATION: 96 % | RESPIRATION RATE: 16 BRPM | TEMPERATURE: 98.2 F | SYSTOLIC BLOOD PRESSURE: 140 MMHG

## 2023-07-10 DIAGNOSIS — I10 PRIMARY HYPERTENSION: ICD-10-CM

## 2023-07-10 DIAGNOSIS — G47.33 OSA (OBSTRUCTIVE SLEEP APNEA): ICD-10-CM

## 2023-07-10 DIAGNOSIS — E66.9 CLASS 1 OBESITY WITH SERIOUS COMORBIDITY AND BODY MASS INDEX (BMI) OF 34.0 TO 34.9 IN ADULT, UNSPECIFIED OBESITY TYPE: Primary | ICD-10-CM

## 2023-07-10 DIAGNOSIS — E11.69 TYPE 2 DIABETES MELLITUS WITH OTHER SPECIFIED COMPLICATION, WITHOUT LONG-TERM CURRENT USE OF INSULIN (HCC): Primary | ICD-10-CM

## 2023-07-10 DIAGNOSIS — E55.9 VITAMIN D DEFICIENCY: ICD-10-CM

## 2023-07-10 DIAGNOSIS — E11.65 TYPE 2 DIABETES MELLITUS WITH HYPERGLYCEMIA, WITHOUT LONG-TERM CURRENT USE OF INSULIN (HCC): ICD-10-CM

## 2023-07-10 DIAGNOSIS — F33.9 DEPRESSION, RECURRENT (HCC): ICD-10-CM

## 2023-07-10 DIAGNOSIS — E78.5 HYPERLIPIDEMIA, UNSPECIFIED HYPERLIPIDEMIA TYPE: ICD-10-CM

## 2023-07-10 PROCEDURE — 99214 OFFICE O/P EST MOD 30 MIN: CPT | Performed by: PHYSICIAN ASSISTANT

## 2023-07-10 PROCEDURE — 99214 OFFICE O/P EST MOD 30 MIN: CPT | Performed by: FAMILY MEDICINE

## 2023-07-10 NOTE — PATIENT INSTRUCTIONS
Monitor diet and maintain physical activity. Continue Janumet 50/2000 twice a day, glimepiride 4 mg twice a day. Increase Jardiance to 25 mg daily. Can take 2 tabs until prescription runs out. Recommend checking blood sugar 1 to 2 times a day, and it would be helpful at alternating times. Send in blood sugar logs in 2-4 weeks. Call the office with any concerns or questions. Follow-up in 3 months with lab work completed prior to visit.

## 2023-07-10 NOTE — PROGRESS NOTES
Name: Ariela Alonso      : 1970      MRN: [id number]  Encounter Provider: Shruthi Pandya DO  Encounter Date: 7/10/2023   Encounter department: [address] [place] [address]   Patient is being referred to [facility] weight management for further evaluation and treatment. Patient to continue present treatment. Instructed to follow a low-fat, low-salt and a low sugar/carbohydrate diet more carefully and get regular aerobic exercise walking 150 minutes/week. Weight loss encouraged. Follow-up with specialists as scheduled and return to the office in 6 months. 1. Class 1 obesity with serious comorbidity and body mass index (BMI) of 34.0 to 34.9 in adult, unspecified obesity type  -     Ambulatory Referral to Weight Management; Future    2. Type 2 diabetes mellitus with hyperglycemia, without long-term current use of insulin ([facility])    3. Primary hypertension    4. Hyperlipidemia, unspecified hyperlipidemia type    5. TRACEY (obstructive sleep apnea)    6. Depression, recurrent ([address])    7. Vitamin D deficiency           Subjective      Patient is being seen in follow-up for chronic conditions and we reviewed recent labs. Patient saw [facility] endocrinology this morning and hemoglobin A1c was 6.9 today. He follows with endocrinology every 3 months. Patient's been feeling well overall and has been walking 1 to 3 miles almost daily at lunch. He complains of difficulty losing weight. Patient using CPAP nightly. Hypertension  This is a chronic problem. The problem is controlled. Pertinent negatives include no anxiety, blurred vision, chest pain, headaches, orthopnea, palpitations, peripheral edema, PND or shortness of breath. Risk factors for coronary artery disease include family history, dyslipidemia, diabetes mellitus, male gender and obesity. Past treatments include angiotensin blockers, diuretics and calcium channel blockers.  The current treatment provides significant improvement. There are no compliance problems. Hypertensive end-organ damage includes kidney disease. There is no history of CAD/MI or CVA. Review of Systems   Eyes: Negative for blurred vision. Respiratory: Negative for shortness of breath. Cardiovascular: Negative for chest pain, palpitations, orthopnea and PND. Neurological: Negative for headaches. Current Outpatient Medications on File Prior to Visit   Medication Sig   • ALPRAZolam (XANAX) 0.25 mg tablet TAKE 1 TABLET (0.25 MG TOTAL) BY MOUTH 3 (THREE) TIMES A DAY AS NEEDED FOR ANXIETY. • amLODIPine (NORVASC) 5 mg tablet TAKE 1 TABLET (5 MG TOTAL) BY MOUTH IN THE MORNING. • aspirin 81 MG tablet Take 81 mg by mouth daily   • Blood Glucose Monitoring Suppl (ACCU-CHEK JESSY CONNECT) w/Device KIT by Does not apply route   • Cholecalciferol (VITAMIN D3) 2000 UNITS TABS Take 1 tablet by mouth daily   • gabapentin (NEURONTIN) 300 mg capsule TAKE 1 CAPSULE BY MOUTH THREE TIMES A DAY   • glimepiride (AMARYL) 4 mg tablet TAKE 1 TABLET BY MOUTH IN THE MORNING AND IN THE EVENING WITH MEALS   • Janumet  MG per tablet TAKE 2 TABLETS BY MOUTH EVERY DAY (Patient taking differently: 1 tablet in the am and 1 tablet in the pm)   • Jardiance 10 MG TABS tablet TAKE 1 TABLET BY MOUTH EVERY MORNING. • Omega-3 Fatty Acids (FISH OIL) 1,000 mg Take 1 capsule by mouth daily   • sertraline (ZOLOFT) 100 mg tablet TAKE 2 TABLETS BY MOUTH DAILY   • sildenafil (REVATIO) 20 mg tablet TAKE 3 TABLETS BY MOUTH DAILY AS NEEDED FOR ERECTILE DYSFUNCTION.    • simvastatin (ZOCOR) 40 mg tablet TAKE 1 TABLET BY MOUTH EVERYDAY AT BEDTIME   • tamsulosin (FLOMAX) 0.4 mg TAKE 1 CAPSULE BY MOUTH EVERY DAY WITH DINNER   • valsartan-hydrochlorothiazide (DIOVAN-HCT) 320-12.5 MG per tablet TAKE 1 TABLET BY MOUTH EVERY DAY       Objective     /82   Pulse 84   Temp 98.2 °F (36.8 °C) (Tympanic)   Resp 16   Ht 5' 11" (1.803 m)   Wt 115 kg (253 lb 9.6 oz)   SpO2 96%   BMI 35.37 kg/m²     Physical Exam  Constitutional:       General: He is not in acute distress. Appearance: Normal appearance. He is obese. HENT:      Head: Normocephalic. Mouth/Throat:      Mouth: Mucous membranes are moist.   Eyes:      General: No scleral icterus. Conjunctiva/sclera: Conjunctivae normal.   Neck:      Vascular: No carotid bruit. Cardiovascular:      Rate and Rhythm: Normal rate and regular rhythm. Pulmonary:      Effort: Pulmonary effort is normal.      Breath sounds: Normal breath sounds. Abdominal:      Palpations: Abdomen is soft. Tenderness: There is no abdominal tenderness. Musculoskeletal:      Cervical back: Neck supple. Right lower leg: No edema. Left lower leg: No edema. Lymphadenopathy:      Cervical: No cervical adenopathy. Skin:     General: Skin is warm and dry. Neurological:      General: No focal deficit present. Mental Status: He is alert and oriented to person, place, and time. Psychiatric:         Mood and Affect: Mood normal.         Behavior: Behavior normal.         Thought Content:  Thought content normal.         Judgment: Judgment normal.       Maria Elena Murrieta,

## 2023-07-10 NOTE — PROGRESS NOTES
Anna Wilkerson 52 y.o. male MRN: [id number]    Encounter: [id number]      Assessment/Plan     Assessment: This is a 52y.o.-year-old male with type 2 diabetes with neuropathy, hypertension and hyperlipidemia. Plan:  1. Type 2 diabetes: Most recent hemoglobin A1c was 6.9. Blood sugars doing well, however I am concerned about kidney function. Like to increase his Jardiance to 25 mg daily. He can take 2 tablets daily until he runs out of the 10 mg tablets. Continue with Janumet  mg twice a day and glimepiride 4 mg twice a day. He did mention that he is trying to lose weight and this has been brought up with his PCP several times. There is a possibility we could try a GLP-1 agonist in the future. Otherwise continue with lifestyle modifications to help improve glucose levels. Follow-up in 3 months with lab work completed prior to visit.     2. Diabetic neuropathy:  Stable. Diabetic foot exam is up-to-date. 3.  Microalbuminuria: Microalbumin/creatinine ratio has increased over the year. Is on valsartan. Did increase his Jardiance as noted above. Likely repeat in 6 months rather than a year.     4. Hypertension:  Normotensive in the office today. Kidney function stable with normal electrolytes. Carmela Esquivel will monitor at this time.  Continue with current medications.  Repeat CMP prior to next office visit.     5. Hyperlipidemia: Triglycerides continue to be elevated. May need to start fenofibrate in the future.   Continue with lifestyle modifications to help improve triglycerides, and hopefully increase HDL.     CC: Type 2 diabetes follow-up    History of Present Illness     HPI:  Bright Nayana LANGSTON 5 NHDT old male with type 2 diabetes with neuropathy for 15 years, hypertension, hyperlipidemia for follow-up.  He 1st started on metformin and eventually this was switched to [facility] had tried Invokana in the past but it caused nausea.  He is still having higher blood sugars in the morning so was concerned about his diabetes especially in light of his family history that is strong with diabetes causing amputations and early mortality.  He is on oral agents at home and takes Janumet  mg twice a day, Jardiance 10 mg daily, and glimepiride 4 mg twice a day. He denies any polyuria, polydipsia, polyphagia, and blurry vision.  He has nocturia 0 to twice a night.  He has numbness and tingling of the feet unchanged.  He denies chest pain or shortness of breath.  He denies nephropathy, retinopathy, heart attack, stroke and claudication but does admit to neuropathy. Has been trying to lose weight, but has been having difficulty. Does admit to some weight gain as he was on vacation in [place] for the past 2 weeks. Marianna Later is doing well today with no concerns or questions.     He did follow-up with [facility] 2022.       He has a strong family history of diabetes in both paternal and maternal side of the family. Jose Sheridan  early at 61 with amputations from diabetes and paternal aunt  in her 62s with leg amputations.     Hypoglycemic episodes: No never. Will have low blood sugar symptoms in the 90's. H/o of hypoglycemia causing hospitalization or intervention such as glucagon injection  or ambulance call  No.  Hypoglycemia symptoms: jitteriness and nausea. Treatment of hypoglycemia: will drink OJ or eat sugar. Glucagon:No.  Medic alert tag: recommended,Yes.      The patient's last eye exam was in  with no retinopathy.  The patient's last foot exam was 2024. [facility] does not see Podiatry.  Most recent hemoglobin A1c completed on 2023 was 6.9     Blood Sugar/Glucometer/Pump/CGM review:  Has not necessarily been checking blood sugars on a routine basis. States blood sugars are averaging in the mid 100s.       He has hyperlipidemia and takes simvastatin 40 mg daily. [facility] denies chest pain or shortness of breath.       He has hypertension and takes amlodipine 5 mg daily and valsartan/HCTZ 320/12.5 mg daily. Suann Brittle denies headache or stroke-like symptoms.  When he was on a higher dose of amlodipine 10 mg daily, he had orthostatics symptoms and fatigue. Review of Systems   Constitutional: Negative for activity change, appetite change, fatigue and unexpected weight change. HENT: Negative for trouble swallowing. Eyes: Negative for visual disturbance. Respiratory: Positive for apnea. Negative for chest tightness and shortness of breath. Cardiovascular: Negative for chest pain, palpitations and leg swelling. Gastrointestinal: Negative for abdominal pain, diarrhea, nausea and vomiting. Endocrine: Negative for cold intolerance, heat intolerance, polydipsia, polyphagia and polyuria. Occasional nocturia   Genitourinary: Negative for frequency. Skin: Negative for rash and wound. Neurological: Positive for numbness. Negative for dizziness, weakness, light-headedness and headaches. Psychiatric/Behavioral: Negative for dysphoric mood and sleep disturbance. The patient is not nervous/anxious.         Historical Information   Past Medical History:   Diagnosis Date   • Anxiety    • COVID-19 10/28/2022   • Diabetes mellitus ([address])    • Hyperlipidemia    • Hypertension    • Kidney stone    • Psychiatric disorder     Anxiety     Past Surgical History:   Procedure Laterality Date   • EXTRACORPOREAL SHOCK WAVE LITHOTRIPSY     • KNEE ARTHROSCOPY Left     torn meniscus   • GA CYSTO/URETERO W/LITHOTRIPSY &INDWELL STENT INSRT Left 5/23/2017    Procedure: CYSTOSCOPY; URETEROSCOPY; HOLMIUM LASER; RETROGRADE PYELOGRAM; STENT PLACEMENT ;  Surgeon: Nayla Stephenson MD;  Location: [facility];  Service: Urology   • GA LITHOTRIPSY XTRCORP SHOCK WAVE Left 5/18/2017    Procedure: ESWL ;  Surgeon: Selvin Coker MD;  Location[facility];  Service: Urology     Social History   Social History     Substance and Sexual Activity   Alcohol Use Yes   • Alcohol/week: 5.0 standard drinks of alcohol • Types: 5 Standard drinks or equivalent per week    Comment: SOCIAL- 3-4 drinks a week with a meal     Social History     Substance and Sexual Activity   Drug Use No     Social History     Tobacco Use   Smoking Status Never   Smokeless Tobacco Never     Family History:   Family History   Problem Relation Age of Onset   • Cancer Maternal Grandmother    • Breast cancer Maternal Grandmother    • Diabetes unspecified Mother         prediabetes   • Thyroid disease unspecified Mother    • Diabetes Father    • Hypertension Father    • Hyperlipidemia Father    • Diabetes type II Father         toe amputation   • No Known Problems Sister    • Alcohol abuse Brother    • Polycystic ovary syndrome Daughter         possibly, on metformin after weight gain   • No Known Problems Daughter        Meds/Allergies   Current Outpatient Medications   Medication Sig Dispense Refill   • ALPRAZolam (XANAX) 0.25 mg tablet TAKE 1 TABLET (0.25 MG TOTAL) BY MOUTH 3 (THREE) TIMES A DAY AS NEEDED FOR ANXIETY. 30 tablet 0   • amLODIPine (NORVASC) 5 mg tablet TAKE 1 TABLET (5 MG TOTAL) BY MOUTH IN THE MORNING. 90 tablet 1   • aspirin 81 MG tablet Take 81 mg by mouth daily     • Blood Glucose Monitoring Suppl (ACCU-CHEK [employer]) w/Device KIT by Does not apply route     • Cholecalciferol (VITAMIN D3) 2000 UNITS TABS Take 1 tablet by mouth daily     • gabapentin (NEURONTIN) 300 mg capsule TAKE 1 CAPSULE BY MOUTH THREE TIMES A DAY 90 capsule 5   • glimepiride (AMARYL) 4 mg tablet TAKE 1 TABLET BY MOUTH IN THE MORNING AND IN THE EVENING WITH MEALS 180 tablet 0   • Janumet  MG per tablet TAKE 2 TABLETS BY MOUTH EVERY DAY (Patient taking differently: 1 tablet in the am and 1 tablet in the pm) 180 tablet 1   • Jardiance 10 MG TABS tablet TAKE 1 TABLET BY MOUTH EVERY MORNING.  30 tablet 6   • Omega-3 Fatty Acids (FISH OIL) 1,000 mg Take 1 capsule by mouth daily     • sertraline (ZOLOFT) 100 mg tablet TAKE 2 TABLETS BY MOUTH DAILY 180 tablet 1 • sildenafil (REVATIO) 20 mg tablet TAKE 3 TABLETS BY MOUTH DAILY AS NEEDED FOR ERECTILE DYSFUNCTION. 90 tablet 0   • simvastatin (ZOCOR) 40 mg tablet TAKE 1 TABLET BY MOUTH EVERYDAY AT BEDTIME 90 tablet 0   • tamsulosin (FLOMAX) 0.4 mg TAKE 1 CAPSULE BY MOUTH EVERY DAY WITH DINNER 90 capsule 1   • valsartan-hydrochlorothiazide (DIOVAN-HCT) 320-12.5 MG per tablet TAKE 1 TABLET BY MOUTH EVERY DAY 90 tablet 1     No current facility-administered medications for this visit. No Known Allergies    Objective   Vitals: Blood pressure 132/82, pulse 82, height 5' 11" (1.803 m), weight 115 kg (253 lb). Physical Exam  Vitals and nursing note reviewed. Constitutional:       General: He is not in acute distress. Appearance: Normal appearance. He is not diaphoretic. HENT:      Head: Normocephalic and atraumatic. Eyes:      General: No scleral icterus. Extraocular Movements: Extraocular movements intact. Conjunctiva/sclera: Conjunctivae normal.      Pupils: Pupils are equal, round, and reactive to light. Cardiovascular:      Rate and Rhythm: Normal rate and regular rhythm. Heart sounds: No murmur heard. Pulmonary:      Effort: Pulmonary effort is normal. No respiratory distress. Breath sounds: Normal breath sounds. No wheezing. Musculoskeletal:      Cervical back: Normal range of motion. Right lower leg: No edema. Left lower leg: No edema. Lymphadenopathy:      Cervical: No cervical adenopathy. Skin:     General: Skin is warm and dry. Neurological:      Mental Status: He is alert and oriented to person, place, and time. Mental status is at baseline. Sensory: No sensory deficit. Gait: Gait normal.   Psychiatric:         Mood and Affect: Mood normal.         Behavior: Behavior normal.         Thought Content: Thought content normal.         The history was obtained from the review of the chart, patient.     Lab Results:   Lab Results   Component Value Date/Time Hemoglobin A1C 6.9 (H) 06/22/2023 07:30 AM    Hemoglobin A1C 7.1 (H) 03/17/2023 07:18 AM    Hemoglobin A1C 6.9 (H) 10/19/2022 07:07 AM    BUN 25 06/22/2023 07:30 AM    BUN 24 03/17/2023 07:18 AM    BUN 26 (H) 10/19/2022 07:07 AM    Potassium 3.5 06/22/2023 07:30 AM    Potassium 3.6 03/17/2023 07:18 AM    Potassium 3.5 10/19/2022 07:07 AM    Chloride 106 06/22/2023 07:30 AM    Chloride 104 03/17/2023 07:18 AM    Chloride 102 10/19/2022 07:07 AM    CO2 30 06/22/2023 07:30 AM    CO2 29 03/17/2023 07:18 AM    CO2 28 10/19/2022 07:07 AM    Creatinine 1.23 06/22/2023 07:30 AM    Creatinine 1.24 03/17/2023 07:18 AM    Creatinine 1.40 (H) 10/19/2022 07:07 AM    AST 33 06/22/2023 07:30 AM    AST 29 03/17/2023 07:18 AM    AST 33 10/19/2022 07:07 AM    ALT 45 06/22/2023 07:30 AM    ALT 44 03/17/2023 07:18 AM    ALT 53 10/19/2022 07:07 AM    Total Protein 7.9 06/22/2023 07:30 AM    Total Protein 7.4 03/17/2023 07:18 AM    Total Protein 8.1 10/19/2022 07:07 AM    Albumin 3.9 06/22/2023 07:30 AM    Albumin 4.0 03/17/2023 07:18 AM    Albumin 4.1 10/19/2022 07:07 AM    HDL, Direct 25 (L) 03/17/2023 07:18 AM    Triglycerides 434 (H) 03/17/2023 07:18 AM       Portions of the record may have been created with voice recognition software. Occasional wrong word or "sound a like" substitutions may have occurred due to the inherent limitations of voice recognition software. Read the chart carefully and recognize, using context, where substitutions have occurred.

## 2023-07-11 ENCOUNTER — VBI (OUTPATIENT)
Dept: ADMINISTRATIVE | Facility: OTHER | Age: 53
End: 2023-07-11

## 2023-07-18 ENCOUNTER — OFFICE VISIT (OUTPATIENT)
Dept: SLEEP CENTER | Facility: HOSPITAL | Age: 53
End: 2023-07-18
Payer: COMMERCIAL

## 2023-07-18 VITALS — WEIGHT: 254 LBS | SYSTOLIC BLOOD PRESSURE: 130 MMHG | BODY MASS INDEX: 35.43 KG/M2 | DIASTOLIC BLOOD PRESSURE: 80 MMHG

## 2023-07-18 DIAGNOSIS — I10 PRIMARY HYPERTENSION: ICD-10-CM

## 2023-07-18 DIAGNOSIS — R00.0 TACHYCARDIA: ICD-10-CM

## 2023-07-18 DIAGNOSIS — G47.61 PLMD (PERIODIC LIMB MOVEMENT DISORDER): ICD-10-CM

## 2023-07-18 DIAGNOSIS — F41.9 ANXIETY AND DEPRESSION: ICD-10-CM

## 2023-07-18 DIAGNOSIS — G47.19 EXCESSIVE DAYTIME SLEEPINESS: ICD-10-CM

## 2023-07-18 DIAGNOSIS — G47.09 OTHER INSOMNIA: ICD-10-CM

## 2023-07-18 DIAGNOSIS — F45.8 BRUXISM: ICD-10-CM

## 2023-07-18 DIAGNOSIS — E11.42 DIABETIC PERIPHERAL NEUROPATHY (HCC): ICD-10-CM

## 2023-07-18 DIAGNOSIS — G47.33 OSA (OBSTRUCTIVE SLEEP APNEA): Primary | ICD-10-CM

## 2023-07-18 DIAGNOSIS — E66.9 CLASS 1 OBESITY WITH SERIOUS COMORBIDITY AND BODY MASS INDEX (BMI) OF 34.0 TO 34.9 IN ADULT, UNSPECIFIED OBESITY TYPE: ICD-10-CM

## 2023-07-18 DIAGNOSIS — F32.A ANXIETY AND DEPRESSION: ICD-10-CM

## 2023-07-18 PROCEDURE — 99214 OFFICE O/P EST MOD 30 MIN: CPT | Performed by: INTERNAL MEDICINE

## 2023-07-18 NOTE — PATIENT INSTRUCTIONS

## 2023-07-18 NOTE — PROGRESS NOTES
Follow-Up Note - [facility]   King Cox  52 y.o. male  :1970  BAT:[id number]  DOS:2023    CC: I saw this patient for follow-up in clinic today for Sleep disordered breathing, Coexisting Sleep and Medical Problems. Interval changes: Treatment was initiated using a ResMed machine. A sleep study to titrate Positive airway pressure (PAP) therapy was undertaken. The patient had both diagnostic and therapeutic sleep studies and is here to review results and to adjust therapy. The diagnostic study confirmed obstructive sleep apnea:  AHI of 23.5 events per hour of sleep. The AHI in the supine position was 42.4. The AHI during REM sleep was 29.5. Moderate intensity snoring was noted. The lowest oxygen saturation was 80.0%. The amount of sleep time below 90% was 8.1 minutes. During the subsequent therapeutic study, sleep disordered breathing was adequately remediated with PAP at 13 H2O. Auto titrating PAP 13-15 cm H2O was recommended. PFSH, Problem List, Medications & Allergies were reviewed in EMR. He  has a past medical history of Anxiety, COVID-19 (10/28/2022), Diabetes mellitus ([address]), Hyperlipidemia, Hypertension, Kidney stone, and Psychiatric disorder. He has a current medication list which includes the following prescription(s): alprazolam, amlodipine, aspirin, accu-chek anne marie connect, vitamin d3, gabapentin, glimepiride, janumet, jardiance, fish oil, sertraline, sildenafil, simvastatin, tamsulosin, and valsartan-hydrochlorothiazide. PHYSIOLOGICAL DATA REVIEW : Device has been used 28/30 days and average on days used is 4 hours and 16 minutes. At times he does not reapply the mask after bathroom breaks. He also notes the device is not logging hours of use accurately. Using PAP > 4 hours/night 53%.  Estimated CONCHA when you use it breathing very well overall1.6/hour with pressure of pressure most of the time is around 14.1cm Ruthy[employer] percentile; patient has not been using non FDA approved devices to sanitize the machine. INTERPRETATION: Compliance is good; Pressure setting is:optimal; ;   SUBJECTIVE: With respect to use of PAP, Hubert Snider  is experiencing some adverse effects:dry mouth/throat. He derives benefit. Is satisfied with sleep and daytime function. Sleep Routine: Hubert Snider reports getting 6-7 hrs sleep9; he has difficulty initiating sleep, but not maintaining sleep . He has racing thoughts due to work-related stress and spite of treatment with Zoloft. He uses Xanax as needed. He arises before alarm most days and feels more refreshed since on Rx. Hubert Snider reports significantly improved excessive daytime sleepiness, [feels like napping but does not have the opportunity and may nap on weekends when he can.] He rated [himself] at 9 /24 on the [place] Sleepiness Scale. Other issues: Periodic limb movements of sleep are controlled on gabapentin that he uses for diabetic peripheral neuropathy and symptoms typically occur at night. Habits:[ reports that he has never smoked. He has never used smokeless tobacco.], [ reports current alcohol use of about 5.0 standard drinks of alcohol per week.], [ reports no history of drug use.], Caffeine use:limited; Exercise routine: irregular. ROS: Significant for weight has been stable. A 10 point review of systems was otherwise negative. [facility] /80 (BP Location: Right arm, Patient Position: Sitting, Cuff Size: Standard)   Wt 115 kg (254 lb)   BMI 35.43 kg/m²     Wt Readings from Last 3 Encounters:   07/18/23 115 kg (254 lb)   07/10/23 115 kg (253 lb 9.6 oz)   07/10/23 115 kg (253 lb)      Patient is well groomed; well appearing. CNS: Alert, orientated, clear & coherent speech. Psych: cooperative and in no distress. Mental state: Appears normal.  H&N: EOMI; NC/AT: No facial pressure marks, no rashes.     Skin/Extrem: col & hydration normal; no edema  Resp: Respiratory effort is normal  Physical findings otherwise essentially unchanged from previous. IMPRESSION: Problem List Items & Comorbidities Addressed this Visit    1. TRACEY (obstructive sleep apnea)  PAP DME Resupply/Reorder      2. Other insomnia        3. PLMD (periodic limb movement disorder)        4. Excessive daytime sleepiness        5. Bruxism        6. Primary hypertension        7. Tachycardia        8. Anxiety and depression        9. Class 1 obesity with serious comorbidity and body mass index (BMI) of 34.0 to 34.9 in adult, unspecified obesity type        10. Diabetic peripheral neuropathy ([address])            PLAN:  1. I reviewed results of prior studies and physiologic data with the patient. 2. I discussed treatment options with risks and benefits. 3. Treatment with  PAP is medically necessary and Merlin Bisi is agreable to continue use. 4. Care of equipment, methods to improve comfort using PAP and importance of compliance with therapy were discussed. 5. Pressure setting: continue 13-15 cmH2O.    6. Rx provided to replace supplies and Care coordinated with DME provider. 7. Multi component Cognitive behavioral therapy for Insomnia undertaken - Sleep Restriction, Stimulus control, Relaxation techniques and Sleep hygiene were discussed. 8. Consideration to be given to duloxetine in place of sertraline. This would address neuropathy and reduce or eliminate need for gabapentin. 9. Discussed strategies for weight reduction. 10. Follow-up is advised in 1 year or sooner if needed to monitor progress, compliance and to adjust therapy. Thank you for allowing me to participate in the care of this patient. Sincerely,     Authenticated electronically on 06/42/30   Board Certified Specialist     Portions of the record may have been created with voice recognition software. Occasional wrong word or "sound a like" substitutions may have occurred due to the inherent limitations of voice recognition software.  There may also be notations and random deletions of words or characters from malfunctioning software. Read the chart carefully and recognize, using context, where substitutions/deletions have occurred.

## 2023-07-19 ENCOUNTER — TELEPHONE (OUTPATIENT)
Dept: SLEEP CENTER | Facility: CLINIC | Age: 53
End: 2023-07-19

## 2023-07-20 LAB

## 2023-07-28 DIAGNOSIS — I10 ESSENTIAL HYPERTENSION: ICD-10-CM

## 2023-07-28 RX ORDER — VALSARTAN AND HYDROCHLOROTHIAZIDE 320; 12.5 MG/1; MG/1
TABLET, FILM COATED ORAL
Qty: 90 TABLET | Refills: 2 | Status: SHIPPED | OUTPATIENT
Start: 2023-07-28

## 2023-08-01 DIAGNOSIS — E11.65 TYPE 2 DIABETES MELLITUS WITH HYPERGLYCEMIA, WITHOUT LONG-TERM CURRENT USE OF INSULIN (HCC): Primary | ICD-10-CM

## 2023-08-24 DIAGNOSIS — E11.69 TYPE 2 DIABETES MELLITUS WITH OTHER SPECIFIED COMPLICATION, WITHOUT LONG-TERM CURRENT USE OF INSULIN (HCC): ICD-10-CM

## 2023-08-24 RX ORDER — GLIMEPIRIDE 4 MG/1
TABLET ORAL
Qty: 180 TABLET | Refills: 0 | Status: SHIPPED | OUTPATIENT
Start: 2023-08-24

## 2023-09-04 DIAGNOSIS — E11.65 TYPE 2 DIABETES MELLITUS WITH HYPERGLYCEMIA, WITHOUT LONG-TERM CURRENT USE OF INSULIN (HCC): ICD-10-CM

## 2023-09-05 RX ORDER — EMPAGLIFLOZIN 10 MG/1
10 TABLET, FILM COATED ORAL EVERY MORNING
Qty: 30 TABLET | Refills: 6 | Status: SHIPPED | OUTPATIENT
Start: 2023-09-05

## 2023-09-11 DIAGNOSIS — E11.65 TYPE 2 DIABETES MELLITUS WITH HYPERGLYCEMIA, WITHOUT LONG-TERM CURRENT USE OF INSULIN (HCC): ICD-10-CM

## 2023-09-18 DIAGNOSIS — E78.5 HYPERLIPIDEMIA, UNSPECIFIED HYPERLIPIDEMIA TYPE: ICD-10-CM

## 2023-09-18 RX ORDER — SIMVASTATIN 40 MG
TABLET ORAL
Qty: 90 TABLET | Refills: 0 | Status: SHIPPED | OUTPATIENT
Start: 2023-09-18

## 2023-09-27 ENCOUNTER — APPOINTMENT (OUTPATIENT)
Dept: LAB | Age: 53
End: 2023-09-27
Payer: COMMERCIAL

## 2023-09-27 DIAGNOSIS — E11.69 TYPE 2 DIABETES MELLITUS WITH OTHER SPECIFIED COMPLICATION, WITHOUT LONG-TERM CURRENT USE OF INSULIN (HCC): ICD-10-CM

## 2023-09-27 LAB
ALBUMIN SERPL BCP-MCNC: 4.4 G/DL (ref 3.5–5)
ALP SERPL-CCNC: 62 U/L (ref 34–104)
ALT SERPL W P-5'-P-CCNC: 40 U/L (ref 7–52)
ANION GAP SERPL CALCULATED.3IONS-SCNC: 6 MMOL/L
AST SERPL W P-5'-P-CCNC: 29 U/L (ref 13–39)
BILIRUB SERPL-MCNC: 1.05 MG/DL (ref 0.2–1)
BUN SERPL-MCNC: 28 MG/DL (ref 5–25)
CALCIUM SERPL-MCNC: 9.7 MG/DL (ref 8.4–10.2)
CHLORIDE SERPL-SCNC: 101 MMOL/L (ref 96–108)
CO2 SERPL-SCNC: 29 MMOL/L (ref 21–32)
CREAT SERPL-MCNC: 1.17 MG/DL (ref 0.6–1.3)
EST. AVERAGE GLUCOSE BLD GHB EST-MCNC: 140 MG/DL
GFR SERPL CREATININE-BSD FRML MDRD: 69 ML/MIN/1.73SQ M
GLUCOSE P FAST SERPL-MCNC: 124 MG/DL (ref 65–99)
HBA1C MFR BLD: 6.5 %
POTASSIUM SERPL-SCNC: 3.7 MMOL/L (ref 3.5–5.3)
PROT SERPL-MCNC: 7.9 G/DL (ref 6.4–8.4)
SODIUM SERPL-SCNC: 136 MMOL/L (ref 135–147)

## 2023-09-27 PROCEDURE — 36415 COLL VENOUS BLD VENIPUNCTURE: CPT

## 2023-09-27 PROCEDURE — 80053 COMPREHEN METABOLIC PANEL: CPT

## 2023-09-27 PROCEDURE — 83036 HEMOGLOBIN GLYCOSYLATED A1C: CPT

## 2023-10-07 DIAGNOSIS — E11.42 DIABETIC PERIPHERAL NEUROPATHY (HCC): ICD-10-CM

## 2023-10-07 RX ORDER — GABAPENTIN 300 MG/1
CAPSULE ORAL
Qty: 90 CAPSULE | Refills: 5 | Status: SHIPPED | OUTPATIENT
Start: 2023-10-07

## 2023-10-23 ENCOUNTER — OFFICE VISIT (OUTPATIENT)
Dept: ENDOCRINOLOGY | Facility: HOSPITAL | Age: 53
End: 2023-10-23
Payer: COMMERCIAL

## 2023-10-23 VITALS
HEIGHT: 71 IN | DIASTOLIC BLOOD PRESSURE: 90 MMHG | WEIGHT: 240.4 LBS | BODY MASS INDEX: 33.65 KG/M2 | SYSTOLIC BLOOD PRESSURE: 144 MMHG | HEART RATE: 96 BPM

## 2023-10-23 DIAGNOSIS — E11.65 TYPE 2 DIABETES MELLITUS WITH HYPERGLYCEMIA, WITHOUT LONG-TERM CURRENT USE OF INSULIN (HCC): Primary | ICD-10-CM

## 2023-10-23 PROCEDURE — 95251 CONT GLUC MNTR ANALYSIS I&R: CPT | Performed by: PHYSICIAN ASSISTANT

## 2023-10-23 PROCEDURE — 99214 OFFICE O/P EST MOD 30 MIN: CPT | Performed by: PHYSICIAN ASSISTANT

## 2023-10-23 NOTE — PROGRESS NOTES
Clare Hatchet 52 y.o. male MRN: [id number]    Encounter: [id number]      Assessment/Plan     Assessment: This is a 52y.o.-year-old male with type 2 diabetes with neuropathy, hypertension and hyperlipidemia. Plan:  1. Type 2 diabetes: Most recent hemoglobin A1c was 6.5. This is the best his A1c has been in years, and is doing an excellent job at this time. He will continue with Janumet  mg twice a day, glimepiride 4 mg twice a day, and Jardiance 10 mg daily. It looks like he has had significant improvement since utilizing the freestyle mandy and I recommend that he continue utilizing this. Continue with lifestyle modifications to help improve glucose levels. Contact the office with any concerns or questions. Follow-up in 3 months with lab work completed prior to visit. 2. Diabetic neuropathy:  Stable. Diabetic foot exam is up-to-date. 3.  Microalbuminuria: Microalbumin/creatinine ratio has increased over the year. Is on valsartan and Jardiance. We will continue to monitor over time. 4. Hypertension:  Normotensive in the office today. Kidney function stable with normal electrolytes. We will monitor at this time. Continue with current medications. Repeat CMP prior to next office visit. 5. Hyperlipidemia: Triglycerides continue to be elevated. May need to start fenofibrate in the future. Continue with lifestyle modifications to help improve triglycerides, and hopefully increase HDL. CC: Type 2 diabetes follow-up    History of Present Illness     HPI:  Clare Hatchet is a 52year old male with type 2 diabetes with neuropathy for 15 years, hypertension, hyperlipidemia for follow-up. He 1st started on metformin and eventually this was switched to [address] He had tried Invokana in the past but it caused nausea.   He is still having higher blood sugars in the morning so was concerned about his diabetes especially in light of his family history that is strong with diabetes causing amputations and early mortality. He is on oral agents at home and takes Janumet  mg twice a day, Jardiance 10 mg daily, and glimepiride 4 mg twice a day. Was recommended to increase his Jardiance after last office visit, but stick with the 10 mg dose. Instead has been utilizing freestyle Vianney Thaddeus which has been helping with glucose levels. He denies any polyuria, polydipsia, polyphagia, and blurry vision. He has nocturia 0 to twice a night. He has numbness and tingling of the feet unchanged. He denies chest pain or shortness of breath. He denies nephropathy, retinopathy, heart attack, stroke and claudication but does admit to neuropathy. Continues to try to lose weight and has lost 13 pounds since last office visit. Overall doing well today. He did follow-up with [facility] 2022. He has a strong family history of diabetes in both paternal and maternal side of the family. Father  early at 61 with amputations from diabetes and paternal aunt  in her 62s with leg amputations. Hypoglycemic episodes: No never. Will have low blood sugar symptoms in the 90's. H/o of hypoglycemia causing hospitalization or intervention such as glucagon injection  or ambulance call  No.  Hypoglycemia symptoms: jitteriness and nausea. Treatment of hypoglycemia: will drink OJ or eat sugar. Glucagon:No.  Medic alert tag: recommended,Yes. The patient's last eye exam was in  with no retinopathy. The patient's last foot exam was 2024. He does not see Podiatry. Most recent hemoglobin A1c completed on 2023 was 6.5. Blood Sugar/Glucometer/Pump/CGM review:  Has not necessarily been checking blood sugars on a routine basis. States blood sugars are averaging in the mid 100s. He has hyperlipidemia and takes simvastatin 40 mg daily. He denies chest pain or shortness of breath.        He has hypertension and takes amlodipine 5 mg daily and valsartan/HCTZ 320/12.5 mg daily. He denies headache or stroke-like symptoms. When he was on a higher dose of amlodipine 10 mg daily, he had orthostatics symptoms and fatigue. Review of Systems   Constitutional:  Negative for activity change, appetite change, fatigue and unexpected weight change. HENT:  Negative for trouble swallowing. Eyes:  Negative for visual disturbance. Respiratory:  Positive for apnea. Negative for chest tightness and shortness of breath. Cardiovascular:  Negative for chest pain, palpitations and leg swelling. Gastrointestinal:  Negative for abdominal pain, diarrhea, nausea and vomiting. Endocrine: Negative for cold intolerance, heat intolerance, polydipsia, polyphagia and polyuria. Occasional nocturia   Genitourinary:  Negative for frequency. Skin:  Negative for rash and wound. Neurological:  Positive for numbness. Negative for dizziness, weakness, light-headedness and headaches. Psychiatric/Behavioral:  Negative for dysphoric mood and sleep disturbance. The patient is not nervous/anxious.         Historical Information   Past Medical History:   Diagnosis Date   • Anxiety    • COVID-19 10/28/2022   • Diabetes mellitus ([address])    • Hyperlipidemia    • Hypertension    • Kidney stone    • Psychiatric disorder     Anxiety     Past Surgical History:   Procedure Laterality Date   • EXTRACORPOREAL SHOCK WAVE LITHOTRIPSY     • KNEE ARTHROSCOPY Left     torn meniscus   • AZ CYSTO/URETERO W/LITHOTRIPSY &INDWELL STENT INSRT Left 5/23/2017    Procedure: CYSTOSCOPY; URETEROSCOPY; HOLMIUM LASER; RETROGRADE PYELOGRAM; STENT PLACEMENT ;  Surgeon: Bayron Pulido MD;  Location: [facility];  Service: Urology   • AZ LITHOTRIPSY [address] Left 5/18/2017    Procedure: ESWL ;  Surgeon: Garfield Villanueva MD;  Location: [facility];  Service: Urology     Social History   Social History     Substance and Sexual Activity   Alcohol Use Yes   • Alcohol/week: 5.0 standard drinks of alcohol   • Types: 5 Standard drinks or equivalent per week    Comment: SOCIAL- 3-4 drinks a week with a meal     Social History     Substance and Sexual Activity   Drug Use No     Social History     Tobacco Use   Smoking Status Never   Smokeless Tobacco Never     Family History:   Family History   Problem Relation Age of Onset   • Cancer Maternal Grandmother    • Breast cancer Maternal Grandmother    • Diabetes unspecified Mother         prediabetes   • Thyroid disease unspecified Mother    • Diabetes Father    • Hypertension Father    • Hyperlipidemia Father    • Diabetes type II Father         toe amputation   • No Known Problems Sister    • Alcohol abuse Brother    • Polycystic ovary syndrome Daughter         possibly, on metformin after weight gain   • No Known Problems Daughter        Meds/Allergies   Current Outpatient Medications   Medication Sig Dispense Refill   • ALPRAZolam (XANAX) 0.25 mg tablet TAKE 1 TABLET (0.25 MG TOTAL) BY MOUTH 3 (THREE) TIMES A DAY AS NEEDED FOR ANXIETY. 30 tablet 0   • amLODIPine (NORVASC) 5 mg tablet TAKE 1 TABLET (5 MG TOTAL) BY MOUTH IN THE MORNING.  90 tablet 1   • aspirin 81 MG tablet Take 81 mg by mouth daily     • Blood Glucose Monitoring Suppl (ACCU-CHEK JESSY CONNECT) w/Device KIT by Does not apply route     • Cholecalciferol (VITAMIN D3) 2000 UNITS TABS Take 1 tablet by mouth daily     • Continuous Blood Gluc Sensor (FreeStyle Nazanin 2 Sensor) MISC Sensor changed every 14 days 2 each 2   • gabapentin (NEURONTIN) 300 mg capsule TAKE 1 CAPSULE BY MOUTH THREE TIMES A DAY 90 capsule 5   • glimepiride (AMARYL) 4 mg tablet TAKE 1 TABLET BY MOUTH IN THE MORNING AND IN THE EVENING WITH MEALS 180 tablet 0   • Janumet  MG per tablet TAKE 2 TABLETS BY MOUTH EVERY DAY (Patient taking differently: 1 tablet in the am and 1 tablet in the pm) 180 tablet 1   • Jardiance 10 MG TABS tablet TAKE 1 TABLET BY MOUTH EVERY DAY IN THE MORNING 30 tablet 6   • Omega-3 Fatty Acids (FISH OIL) 1,000 mg Take 1 capsule by mouth daily     • sertraline (ZOLOFT) 100 mg tablet TAKE 2 TABLETS BY MOUTH DAILY 180 tablet 1   • sildenafil (REVATIO) 20 mg tablet TAKE 3 TABLETS BY MOUTH DAILY AS NEEDED FOR ERECTILE DYSFUNCTION. 90 tablet 0   • simvastatin (ZOCOR) 40 mg tablet TAKE 1 TABLET BY MOUTH EVERYDAY AT BEDTIME 90 tablet 0   • valsartan-hydrochlorothiazide (DIOVAN-HCT) 320-12.5 MG per tablet TAKE 1 TABLET BY MOUTH EVERY DAY 90 tablet 2   • tamsulosin (FLOMAX) 0.4 mg TAKE 1 CAPSULE BY MOUTH EVERY DAY WITH DINNER (Patient not taking: Reported on 10/23/2023) 90 capsule 1     No current facility-administered medications for this visit. No Known Allergies    Objective   Vitals: Blood pressure 144/90, pulse 96, height 5' 11" (1.803 m), weight 109 kg (240 lb 6.4 oz). Physical Exam  Vitals and nursing note reviewed. Constitutional:       General: He is not in acute distress. Appearance: Normal appearance. He is not diaphoretic. HENT:      Head: Normocephalic and atraumatic. Eyes:      General: No scleral icterus. Extraocular Movements: Extraocular movements intact. Conjunctiva/sclera: Conjunctivae normal.      Pupils: Pupils are equal, round, and reactive to light. Neck:      Thyroid: No thyroid mass, thyromegaly or thyroid tenderness. Cardiovascular:      Rate and Rhythm: Normal rate and regular rhythm. Heart sounds: No murmur heard. Pulmonary:      Effort: Pulmonary effort is normal. No respiratory distress. Breath sounds: Normal breath sounds. No wheezing. Musculoskeletal:      Cervical back: Normal range of motion. Right lower leg: No edema. Left lower leg: No edema. Lymphadenopathy:      Cervical: No cervical adenopathy. Skin:     General: Skin is warm and dry. Neurological:      Mental Status: He is alert and oriented to person, place, and time. Mental status is at baseline. Sensory: No sensory deficit.       Gait: Gait normal.   Psychiatric:         Mood and Affect: Mood normal.         Behavior: Behavior normal.         Thought Content: Thought content normal.         The history was obtained from the review of the chart, patient. Lab Results:   Lab Results   Component Value Date/Time    Hemoglobin A1C 6.5 (H) 09/27/2023 07:27 AM    Hemoglobin A1C 6.9 (H) 06/22/2023 07:30 AM    Hemoglobin A1C 7.1 (H) 03/17/2023 07:18 AM    BUN 28 (H) 09/27/2023 07:27 AM    BUN 25 06/22/2023 07:30 AM    BUN 24 03/17/2023 07:18 AM    Potassium 3.7 09/27/2023 07:27 AM    Potassium 3.5 06/22/2023 07:30 AM    Potassium 3.6 03/17/2023 07:18 AM    Chloride 101 09/27/2023 07:27 AM    Chloride 106 06/22/2023 07:30 AM    Chloride 104 03/17/2023 07:18 AM    CO2 29 09/27/2023 07:27 AM    CO2 30 06/22/2023 07:30 AM    CO2 29 03/17/2023 07:18 AM    Creatinine 1.17 09/27/2023 07:27 AM    Creatinine 1.23 06/22/2023 07:30 AM    Creatinine 1.24 03/17/2023 07:18 AM    AST 29 09/27/2023 07:27 AM    AST 33 06/22/2023 07:30 AM    AST 29 03/17/2023 07:18 AM    ALT 40 09/27/2023 07:27 AM    ALT 45 06/22/2023 07:30 AM    ALT 44 03/17/2023 07:18 AM    Total Protein 7.9 09/27/2023 07:27 AM    Total Protein 7.9 06/22/2023 07:30 AM    Total Protein 7.4 03/17/2023 07:18 AM    Albumin 4.4 09/27/2023 07:27 AM    Albumin 3.9 06/22/2023 07:30 AM    Albumin 4.0 03/17/2023 07:18 AM    HDL, Direct 25 (L) 03/17/2023 07:18 AM    Triglycerides 434 (H) 03/17/2023 07:18 AM         Portions of the record may have been created with voice recognition software. Occasional wrong word or "sound a like" substitutions may have occurred due to the inherent limitations of voice recognition software. Read the chart carefully and recognize, using context, where substitutions have occurred.

## 2023-10-23 NOTE — PATIENT INSTRUCTIONS
Monitor diet and maintain physical activity. Continue Janumet 50/2000 twice a day, glimepiride 4 mg twice a day, Jardiance 10 mg daily. Can try vitamin B12 1000 mcg daily. Recommend checking blood sugar 1 to 2 times a day, and it would be helpful at alternating times. Send in blood sugar logs in 2-4 weeks. Call the office with any concerns or questions. Follow-up in 3 months with lab work completed prior to visit.

## 2023-10-30 DIAGNOSIS — I10 ESSENTIAL HYPERTENSION: ICD-10-CM

## 2023-10-30 RX ORDER — AMLODIPINE BESYLATE 5 MG/1
5 TABLET ORAL DAILY
Qty: 90 TABLET | Refills: 1 | Status: SHIPPED | OUTPATIENT
Start: 2023-10-30

## 2023-11-08 ENCOUNTER — TELEPHONE (OUTPATIENT)
Dept: BARIATRICS | Facility: CLINIC | Age: 53
End: 2023-11-08

## 2023-11-25 DIAGNOSIS — E11.69 TYPE 2 DIABETES MELLITUS WITH OTHER SPECIFIED COMPLICATION, WITHOUT LONG-TERM CURRENT USE OF INSULIN (HCC): ICD-10-CM

## 2023-11-27 RX ORDER — GLIMEPIRIDE 4 MG/1
TABLET ORAL
Qty: 180 TABLET | Refills: 1 | Status: SHIPPED | OUTPATIENT
Start: 2023-11-27

## 2023-11-28 DIAGNOSIS — E11.65 TYPE 2 DIABETES MELLITUS WITH HYPERGLYCEMIA, WITHOUT LONG-TERM CURRENT USE OF INSULIN (HCC): Primary | ICD-10-CM

## 2023-12-12 DIAGNOSIS — F41.9 ANXIETY: ICD-10-CM

## 2023-12-12 RX ORDER — ALPRAZOLAM 0.25 MG/1
0.25 TABLET ORAL 3 TIMES DAILY PRN
Qty: 30 TABLET | Refills: 0 | Status: SHIPPED | OUTPATIENT
Start: 2023-12-12

## 2023-12-12 NOTE — TELEPHONE ENCOUNTER
ALPRAZolam (XANAX) 0.25 mg tablet         Sig: Take 1 tablet (0.25 mg total) by mouth 3 (three) times a day as needed for anxiety    Disp: 30 tablet    Refills: 0    Start: 12/12/2023    Class: Normal    Non-formulary For: Anxiety    To pharmacy: Not to exceed 5 additional fills before 04/02/2023    Last ordered: 8 months ago (3/30/2023) by Yolanda Coyne DO    Psychiatry:  Anxiolytics/Hypnotics [id number]/12/2023 12:01 AM   Protocol Details This refill cannot be delegated    Valid encounter within last 6 months      To be filled at: [address] [facility], [address] [id number]

## 2023-12-19 DIAGNOSIS — E11.65 TYPE 2 DIABETES MELLITUS WITH HYPERGLYCEMIA, WITHOUT LONG-TERM CURRENT USE OF INSULIN (HCC): ICD-10-CM

## 2023-12-27 ENCOUNTER — APPOINTMENT (EMERGENCY)
Dept: CT IMAGING | Facility: HOSPITAL | Age: 53
End: 2023-12-27
Payer: COMMERCIAL

## 2023-12-27 ENCOUNTER — HOSPITAL ENCOUNTER (EMERGENCY)
Facility: HOSPITAL | Age: 53
Discharge: HOME | End: 2023-12-27
Attending: EMERGENCY MEDICINE
Payer: COMMERCIAL

## 2023-12-27 VITALS
WEIGHT: 240.3 LBS | HEART RATE: 92 BPM | SYSTOLIC BLOOD PRESSURE: 163 MMHG | OXYGEN SATURATION: 98 % | RESPIRATION RATE: 18 BRPM | DIASTOLIC BLOOD PRESSURE: 102 MMHG | TEMPERATURE: 97.5 F | BODY MASS INDEX: 33.64 KG/M2 | HEIGHT: 71 IN

## 2023-12-27 DIAGNOSIS — R16.0 HEPATOMEGALY: ICD-10-CM

## 2023-12-27 DIAGNOSIS — N40.0 ENLARGED PROSTATE: ICD-10-CM

## 2023-12-27 DIAGNOSIS — R10.9 RIGHT FLANK PAIN: Primary | ICD-10-CM

## 2023-12-27 LAB
ALBUMIN SERPL BCP-MCNC: 4.6 G/DL (ref 3.5–5)
ALP SERPL-CCNC: 71 U/L (ref 34–104)
ALT SERPL W P-5'-P-CCNC: 30 U/L (ref 7–52)
ANION GAP SERPL CALCULATED.3IONS-SCNC: 9 MMOL/L
AST SERPL W P-5'-P-CCNC: 23 U/L (ref 13–39)
BACTERIA UR QL AUTO: ABNORMAL /HPF
BASOPHILS # BLD AUTO: 0.04 THOUSANDS/ÂΜL (ref 0–0.1)
BASOPHILS NFR BLD AUTO: 1 % (ref 0–1)
BILIRUB SERPL-MCNC: 1.16 MG/DL (ref 0.2–1)
BILIRUB UR QL STRIP: NEGATIVE
BUN SERPL-MCNC: 23 MG/DL (ref 5–25)
CALCIUM SERPL-MCNC: 9.9 MG/DL (ref 8.4–10.2)
CHLORIDE SERPL-SCNC: 103 MMOL/L (ref 96–108)
CLARITY UR: CLEAR
CO2 SERPL-SCNC: 27 MMOL/L (ref 21–32)
COLOR UR: YELLOW
CREAT SERPL-MCNC: 1.16 MG/DL (ref 0.6–1.3)
EOSINOPHIL # BLD AUTO: 0.17 THOUSAND/ÂΜL (ref 0–0.61)
EOSINOPHIL NFR BLD AUTO: 2 % (ref 0–6)
ERYTHROCYTE [DISTWIDTH] IN BLOOD BY AUTOMATED COUNT: 12.7 % (ref 11.6–15.1)
GFR SERPL CREATININE-BSD FRML MDRD: 70 ML/MIN/1.73SQ M
GLUCOSE SERPL-MCNC: 129 MG/DL (ref 65–140)
GLUCOSE UR STRIP-MCNC: ABNORMAL MG/DL
HCT VFR BLD AUTO: 40.8 % (ref 36.5–49.3)
HGB BLD-MCNC: 14.1 G/DL (ref 12–17)
HGB UR QL STRIP.AUTO: NEGATIVE
HYALINE CASTS #/AREA URNS LPF: ABNORMAL /LPF
IMM GRANULOCYTES # BLD AUTO: 0.03 THOUSAND/UL (ref 0–0.2)
IMM GRANULOCYTES NFR BLD AUTO: 0 % (ref 0–2)
KETONES UR STRIP-MCNC: NEGATIVE MG/DL
LEUKOCYTE ESTERASE UR QL STRIP: NEGATIVE
LYMPHOCYTES # BLD AUTO: 2.62 THOUSANDS/ÂΜL (ref 0.6–4.47)
LYMPHOCYTES NFR BLD AUTO: 34 % (ref 14–44)
MCH RBC QN AUTO: 31 PG (ref 26.8–34.3)
MCHC RBC AUTO-ENTMCNC: 34.6 G/DL (ref 31.4–37.4)
MCV RBC AUTO: 90 FL (ref 82–98)
MONOCYTES # BLD AUTO: 0.71 THOUSAND/ÂΜL (ref 0.17–1.22)
MONOCYTES NFR BLD AUTO: 9 % (ref 4–12)
NEUTROPHILS # BLD AUTO: 4.2 THOUSANDS/ÂΜL (ref 1.85–7.62)
NEUTS SEG NFR BLD AUTO: 54 % (ref 43–75)
NITRITE UR QL STRIP: NEGATIVE
NON-SQ EPI CELLS URNS QL MICRO: ABNORMAL /HPF
NRBC BLD AUTO-RTO: 0 /100 WBCS
PH UR STRIP.AUTO: 6 [PH]
PLATELET # BLD AUTO: 227 THOUSANDS/UL (ref 149–390)
PMV BLD AUTO: 10.6 FL (ref 8.9–12.7)
POTASSIUM SERPL-SCNC: 3.7 MMOL/L (ref 3.5–5.3)
PROT SERPL-MCNC: 8 G/DL (ref 6.4–8.4)
PROT UR STRIP-MCNC: ABNORMAL MG/DL
RBC # BLD AUTO: 4.55 MILLION/UL (ref 3.88–5.62)
RBC #/AREA URNS AUTO: ABNORMAL /HPF
SODIUM SERPL-SCNC: 139 MMOL/L (ref 135–147)
SP GR UR STRIP.AUTO: 1.02 (ref 1–1.03)
UROBILINOGEN UR STRIP-ACNC: <2 MG/DL
WBC # BLD AUTO: 7.77 THOUSAND/UL (ref 4.31–10.16)
WBC #/AREA URNS AUTO: ABNORMAL /HPF

## 2023-12-27 PROCEDURE — 74176 CT ABD & PELVIS W/O CONTRAST: CPT

## 2023-12-27 PROCEDURE — 99284 EMERGENCY DEPT VISIT MOD MDM: CPT | Performed by: PHYSICIAN ASSISTANT

## 2023-12-27 PROCEDURE — 85025 COMPLETE CBC W/AUTO DIFF WBC: CPT | Performed by: EMERGENCY MEDICINE

## 2023-12-27 PROCEDURE — 99284 EMERGENCY DEPT VISIT MOD MDM: CPT

## 2023-12-27 PROCEDURE — 80053 COMPREHEN METABOLIC PANEL: CPT | Performed by: EMERGENCY MEDICINE

## 2023-12-27 PROCEDURE — 36415 COLL VENOUS BLD VENIPUNCTURE: CPT

## 2023-12-27 PROCEDURE — G1004 CDSM NDSC: HCPCS

## 2023-12-27 PROCEDURE — 81001 URINALYSIS AUTO W/SCOPE: CPT | Performed by: EMERGENCY MEDICINE

## 2023-12-27 RX ORDER — METHOCARBAMOL 500 MG/1
500 TABLET, FILM COATED ORAL ONCE
Status: COMPLETED | OUTPATIENT
Start: 2023-12-27 | End: 2023-12-27

## 2023-12-27 RX ORDER — METHOCARBAMOL 500 MG/1
500 TABLET, FILM COATED ORAL 3 TIMES DAILY
Qty: 20 TABLET | Refills: 0 | Status: SHIPPED | OUTPATIENT
Start: 2023-12-27

## 2023-12-27 RX ORDER — LIDOCAINE 50 MG/G
1 PATCH TOPICAL ONCE
Status: DISCONTINUED | OUTPATIENT
Start: 2023-12-27 | End: 2023-12-27 | Stop reason: HOSPADM

## 2023-12-27 RX ADMIN — LIDOCAINE 1 PATCH: 50 PATCH TOPICAL at 16:18

## 2023-12-27 RX ADMIN — METHOCARBAMOL 500 MG: 500 TABLET ORAL at 16:18

## 2023-12-27 NOTE — ED PROVIDER NOTES
History  Chief Complaint   Patient presents with    Back Pain     Patient presents to ED with right back/flank pain that began Saturday.      Patient is a 52 y/o M with h/o DM, HTN, kidney stones that presents to the ED with right flank pain that started 3 days ago.  He states the pain comes and goes.  He has urinary frequency a couple days ago,but that resolved.  No fevers, chills, nausea or vomiting.  He was taking ibuprofen for pain, but it doesn't help.  Certain movements do make the pain worse, nothing makes the pain better. No numbness, weakness, radiation of pain or bowel/bladder dysfunction.       Back Pain  Associated symptoms: no abdominal pain, no dysuria, no fever, no headaches and no weakness        Prior to Admission Medications   Prescriptions Last Dose Informant Patient Reported? Taking?   ALPRAZolam (XANAX) 0.25 mg tablet   No No   Sig: Take 1 tablet (0.25 mg total) by mouth 3 (three) times a day as needed for anxiety   Blood Glucose Monitoring Suppl (ACCU-CHEK JESSY CONNECT) w/Device KIT  Self Yes No   Sig: by Does not apply route   Cholecalciferol (VITAMIN D3) 2000 UNITS TABS  Self Yes No   Sig: Take 1 tablet by mouth daily   Continuous Blood Gluc Sensor (FreeStyle Nazanin 2 Sensor) MISC   No No   Sig: SENSOR CHANGED EVERY 14 DAYS   Janumet  MG per tablet  Self No No   Sig: TAKE 2 TABLETS BY MOUTH EVERY DAY   Patient taking differently: 1 tablet in the am and 1 tablet in the pm   Jardiance 10 MG TABS tablet  Self No No   Sig: TAKE 1 TABLET BY MOUTH EVERY DAY IN THE MORNING   Omega-3 Fatty Acids (FISH OIL) 1,000 mg  Self Yes No   Sig: Take 1 capsule by mouth daily   amLODIPine (NORVASC) 5 mg tablet   No No   Sig: TAKE 1 TABLET (5 MG TOTAL) BY MOUTH IN THE MORNING   aspirin 81 MG tablet  Self Yes No   Sig: Take 81 mg by mouth daily   gabapentin (NEURONTIN) 300 mg capsule  Self No No   Sig: TAKE 1 CAPSULE BY MOUTH THREE TIMES A DAY   glimepiride (AMARYL) 4 mg tablet   No No   Sig: TAKE 1 TABLET  BY MOUTH IN THE MORNING AND IN THE EVENING WITH MEALS   sertraline (ZOLOFT) 100 mg tablet  Self No No   Sig: TAKE 2 TABLETS BY MOUTH DAILY   sildenafil (REVATIO) 20 mg tablet  Self No No   Sig: TAKE 3 TABLETS BY MOUTH DAILY AS NEEDED FOR ERECTILE DYSFUNCTION.   simvastatin (ZOCOR) 40 mg tablet  Self No No   Sig: TAKE 1 TABLET BY MOUTH EVERYDAY AT BEDTIME   tamsulosin (FLOMAX) 0.4 mg  Self No No   Sig: TAKE 1 CAPSULE BY MOUTH EVERY DAY WITH DINNER   Patient not taking: Reported on 10/23/2023   valsartan-hydrochlorothiazide (DIOVAN-HCT) 320-12.5 MG per tablet  Self No No   Sig: TAKE 1 TABLET BY MOUTH EVERY DAY      Facility-Administered Medications: None       Past Medical History:   Diagnosis Date    Anxiety     COVID-19 10/28/2022    Diabetes mellitus (HCC)     Hyperlipidemia     Hypertension     Kidney stone     Psychiatric disorder     Anxiety       Past Surgical History:   Procedure Laterality Date    EXTRACORPOREAL SHOCK WAVE LITHOTRIPSY      KNEE ARTHROSCOPY Left     torn meniscus    KS CYSTO/URETERO W/LITHOTRIPSY &INDWELL STENT INSRT Left 5/23/2017    Procedure: CYSTOSCOPY; URETEROSCOPY; HOLMIUM LASER; RETROGRADE PYELOGRAM; STENT PLACEMENT ;  Surgeon: Leif Pablo MD;  Location: [facility];  Service: Urology    KS LITHOTRIPSY XTRCORP SHOCK WAVE Left 5/18/2017    Procedure: ESWL ;  Surgeon: Abdoulaye Roberts MD;  Location: [facility];  Service: Urology       Family History   Problem Relation Age of Onset    Cancer Maternal Grandmother     Breast cancer Maternal Grandmother     Diabetes unspecified Mother         prediabetes    Thyroid disease unspecified Mother     Diabetes Father     Hypertension Father     Hyperlipidemia Father     Diabetes type II Father         toe amputation    No Known Problems Sister     Alcohol abuse Brother     Polycystic ovary syndrome Daughter         possibly, on metformin after weight gain    No Known Problems Daughter      I have reviewed and agree with the history as  documented.    E-Cigarette/Vaping    E-Cigarette Use Never User      E-Cigarette/Vaping Substances    Nicotine No     THC No     CBD No     Flavoring No     Other No     Unknown No      Social History     Tobacco Use    Smoking status: Never    Smokeless tobacco: Never   Vaping Use    Vaping status: Never Used   Substance Use Topics    Alcohol use: Yes     Alcohol/week: 5.0 standard drinks of alcohol     Types: 5 Standard drinks or equivalent per week     Comment: SOCIAL- 3-4 drinks a week with a meal    Drug use: No       Review of Systems   Constitutional:  Negative for chills and fever.   Gastrointestinal:  Negative for abdominal pain, diarrhea, nausea and vomiting.   Genitourinary:  Positive for flank pain and frequency. Negative for dysuria.   Musculoskeletal:  Positive for back pain. Negative for neck pain.   Skin:  Negative for color change, pallor and rash.   Neurological:  Negative for dizziness, weakness, light-headedness and headaches.   Psychiatric/Behavioral:  Negative for confusion.    All other systems reviewed and are negative.      Physical Exam  Physical Exam  Vitals and nursing note reviewed.   Constitutional:       General: He is not in acute distress.     Appearance: Normal appearance. He is well-developed, well-groomed and overweight. He is not ill-appearing or diaphoretic.   HENT:      Head: Normocephalic and atraumatic.      Right Ear: External ear normal.      Left Ear: External ear normal.      Nose: Nose normal.      Mouth/Throat:      Mouth: Mucous membranes are moist.   Eyes:      Conjunctiva/sclera: Conjunctivae normal.   Cardiovascular:      Rate and Rhythm: Normal rate and regular rhythm.      Heart sounds: Normal heart sounds.   Pulmonary:      Effort: Pulmonary effort is normal.      Breath sounds: Normal breath sounds. No wheezing, rhonchi or rales.   Abdominal:      General: Abdomen is flat. Bowel sounds are normal.      Palpations: Abdomen is soft.      Tenderness: There is no  abdominal tenderness. There is no right CVA tenderness or left CVA tenderness.   Musculoskeletal:      Cervical back: Normal and normal range of motion.      Thoracic back: Normal.      Lumbar back: Normal.   Skin:     General: Skin is warm and dry.      Coloration: Skin is not pale.      Findings: No bruising or erythema.   Neurological:      Mental Status: He is alert and oriented to person, place, and time.      Sensory: Sensation is intact.      Motor: Motor function is intact.      Gait: Gait is intact.   Psychiatric:         Mood and Affect: Mood normal.         Behavior: Behavior is cooperative.         Vital Signs  ED Triage Vitals   Temperature Pulse Respirations Blood Pressure SpO2   12/27/23 1302 12/27/23 1303 12/27/23 1302 12/27/23 1303 12/27/23 1302   97.5 °F (36.4 °C) 92 18 (!) 163/102 98 %      Temp Source Heart Rate Source Patient Position - Orthostatic VS BP Location FiO2 (%)   12/27/23 1302 12/27/23 1302 12/27/23 1302 12/27/23 1302 --   Temporal Monitor Sitting Left arm       Pain Score       12/27/23 1302       9           Vitals:    12/27/23 1302 12/27/23 1303   BP:  (!) 163/102   Pulse:  92   Patient Position - Orthostatic VS: Sitting          Visual Acuity      ED Medications  Medications   lidocaine (LIDODERM) 5 % patch 1 patch (1 patch Topical Medication Applied 12/27/23 1618)   methocarbamol (ROBAXIN) tablet 500 mg (500 mg Oral Given 12/27/23 1618)       Diagnostic Studies  Results Reviewed       Procedure Component Value Units Date/Time    Urine Microscopic [[id number]]  (Abnormal) Collected: 12/27/23 1546    Lab Status: Final result Specimen: Urine, Clean Catch Updated: 12/27/23 1634     RBC, UA 0-1 /hpf      WBC, UA 2-4 /hpf      Epithelial Cells Occasional /hpf      Bacteria, UA Occasional /hpf      Hyaline Casts, UA 0-1 /lpf     UA w Reflex to Microscopic w Reflex to Culture [[id number]]  (Abnormal) Collected: 12/27/23 1546    Lab Status: Final result Specimen: Urine, Clean Catch Updated:  12/27/23 1625     Color, UA Yellow     Clarity, UA Clear     Specific Gravity, UA 1.020     pH, UA 6.0     Leukocytes, UA Negative     Nitrite, UA Negative     Protein, UA 30 (1+) mg/dl      Glucose, UA 30 (3/100%) mg/dl      Ketones, UA Negative mg/dl      Urobilinogen, UA <2.0 mg/dl      Bilirubin, UA Negative     Occult Blood, UA Negative    Comprehensive metabolic panel [[id number]]  (Abnormal) Collected: 12/27/23 1305    Lab Status: Final result Specimen: Blood from Arm, Right Updated: 12/27/23 1334     Sodium 139 mmol/L      Potassium 3.7 mmol/L      Chloride 103 mmol/L      CO2 27 mmol/L      ANION GAP 9 mmol/L      BUN 23 mg/dL      Creatinine 1.16 mg/dL      Glucose 129 mg/dL      Calcium 9.9 mg/dL      AST 23 U/L      ALT 30 U/L      Alkaline Phosphatase 71 U/L      Total Protein 8.0 g/dL      Albumin 4.6 g/dL      Total Bilirubin 1.16 mg/dL      eGFR 70 ml/min/1.73sq m     Narrative:      National Kidney Disease Foundation guidelines for Chronic Kidney Disease (CKD):     Stage 1 with normal or high GFR (GFR > 90 mL/min/1.73 square meters)    Stage 2 Mild CKD (GFR = 60-89 mL/min/1.73 square meters)    Stage 3A Moderate CKD (GFR = 45-59 mL/min/1.73 square meters)    Stage 3B Moderate CKD (GFR = 30-44 mL/min/1.73 square meters)    Stage 4 Severe CKD (GFR = 15-29 mL/min/1.73 square meters)    Stage 5 End Stage CKD (GFR <15 mL/min/1.73 square meters)  Note: GFR calculation is accurate only with a steady state creatinine    CBC and differential [[id number]] Collected: 12/27/23 1305    Lab Status: Final result Specimen: Blood from Arm, Right Updated: 12/27/23 1314     WBC 7.77 Thousand/uL      RBC 4.55 Million/uL      Hemoglobin 14.1 g/dL      Hematocrit 40.8 %      MCV 90 fL      MCH 31.0 pg      MCHC 34.6 g/dL      RDW 12.7 %      MPV 10.6 fL      Platelets 227 Thousands/uL      nRBC 0 /100 WBCs      Neutrophils Relative 54 %      Immat GRANS % 0 %      Lymphocytes Relative 34 %      Monocytes Relative 9 %       Eosinophils Relative 2 %      Basophils Relative 1 %      Neutrophils Absolute 4.20 Thousands/µL      Immature Grans Absolute 0.03 Thousand/uL      Lymphocytes Absolute 2.62 Thousands/µL      Monocytes Absolute 0.71 Thousand/µL      Eosinophils Absolute 0.17 Thousand/µL      Basophils Absolute 0.04 Thousands/µL                    CT renal stone study abdomen pelvis without contrast   Final Result by Sergio Ferrer MD (12/27 1540)      1.  Right nephrolithiasis. No evidence of obstructive uropathy.   2.  Hepatomegaly.      Workstation performed: [id number]                    Procedures  Procedures         ED Course                                             Medical Decision Making  Right flank pain, h/o kidney stones, will order labs, UA, Ct scan to r/o obstructing stone.  Pain is worse with movement, most likely MSK, will order robaxin and advised f/u with PCP.  Return precautions given.  Patient notified of enlarged liver and prostate and to f/u with PCP for recheck.     Amount and/or Complexity of Data Reviewed  Labs: ordered.  Radiology: ordered.    Risk  Prescription drug management.             Disposition  Final diagnoses:   Right flank pain   Hepatomegaly   Enlarged prostate     Time reflects when diagnosis was documented in both MDM as applicable and the Disposition within this note       Time User Action Codes Description Comment    12/27/2023  4:13 PM Ondina Raya Add [R10.9] Right flank pain     12/27/2023  4:13 PM Ondina Raya Add [R16.0] Hepatomegaly     12/27/2023  4:13 PM Ondina Raya Add [N40.0] Enlarged prostate           ED Disposition       ED Disposition   Discharge    Condition   Stable    Date/Time   Wed Dec 27, 2023  4:31 PM    Comment   Zac Alvarenga discharge to home/self care.                   Follow-up Information       Follow up With Specialties Details Why Contact Info Additional Information    Scot Temple, DO Family Medicine Schedule an appointment as  soon as possible for a visit in 3 days As needed, For recheck [address]  [facility] [address]  [phone]        [facility] Emergency Department Emergency Medicine Go to  If symptoms worsen [address] [address] [phone] [facility] Emergency Department, [address] [place], [place] [address]            Patient's Medications   Discharge Prescriptions    METHOCARBAMOL (ROBAXIN) 500 MG TABLET    Take 1 tablet (500 mg total) by mouth 3 (three) times a day       Start Date: 12/27/2023End Date: --       Order Dose: 500 mg       Quantity: 20 tablet    Refills: 0       No discharge procedures on file.    PDMP Review         Value Time User    PDMP Reviewed  Yes 12/12/2023 10:37 AM Scot Temple DO            ED Provider  Electronically Signed by             Ondina Raya PA-C  12/27/23 5331

## 2023-12-27 NOTE — ED NOTES
"Per pt \"since Saturday I've been having right sided flank pain, hx of kidney stones but it doesn't feel like that\" denies any trouble with urination or numbness or tingling      Mare Perez RN  12/27/23 8494    "

## 2023-12-27 NOTE — DISCHARGE INSTRUCTIONS
Rest, heating pad to back.  Tylenol/motrin for discomfort.  Take robaxin 3 times a day for pain.  Follow up with family doctor in 3-5 days for recheck.  Return to ER if symptoms worsen.

## 2024-01-05 ENCOUNTER — VBI (OUTPATIENT)
Dept: ADMINISTRATIVE | Facility: OTHER | Age: 54
End: 2024-01-05

## 2024-01-16 ENCOUNTER — OFFICE VISIT (OUTPATIENT)
Dept: FAMILY MEDICINE CLINIC | Facility: CLINIC | Age: 54
End: 2024-01-16
Payer: COMMERCIAL

## 2024-01-16 VITALS
DIASTOLIC BLOOD PRESSURE: 92 MMHG | TEMPERATURE: 98 F | WEIGHT: 241.6 LBS | BODY MASS INDEX: 33.82 KG/M2 | HEIGHT: 71 IN | RESPIRATION RATE: 16 BRPM | SYSTOLIC BLOOD PRESSURE: 148 MMHG | OXYGEN SATURATION: 96 % | HEART RATE: 92 BPM

## 2024-01-16 DIAGNOSIS — G47.33 OSA (OBSTRUCTIVE SLEEP APNEA): ICD-10-CM

## 2024-01-16 DIAGNOSIS — E78.5 HYPERLIPIDEMIA, UNSPECIFIED HYPERLIPIDEMIA TYPE: ICD-10-CM

## 2024-01-16 DIAGNOSIS — Z00.00 ANNUAL PHYSICAL EXAM: Primary | ICD-10-CM

## 2024-01-16 DIAGNOSIS — E11.42 DIABETIC PERIPHERAL NEUROPATHY (HCC): ICD-10-CM

## 2024-01-16 DIAGNOSIS — E11.65 TYPE 2 DIABETES MELLITUS WITH HYPERGLYCEMIA, WITHOUT LONG-TERM CURRENT USE OF INSULIN (HCC): ICD-10-CM

## 2024-01-16 DIAGNOSIS — E66.9 CLASS 1 OBESITY WITH SERIOUS COMORBIDITY AND BODY MASS INDEX (BMI) OF 34.0 TO 34.9 IN ADULT, UNSPECIFIED OBESITY TYPE: ICD-10-CM

## 2024-01-16 DIAGNOSIS — I10 PRIMARY HYPERTENSION: ICD-10-CM

## 2024-01-16 DIAGNOSIS — R10.9 RIGHT FLANK PAIN: ICD-10-CM

## 2024-01-16 DIAGNOSIS — F41.9 ANXIETY: ICD-10-CM

## 2024-01-16 DIAGNOSIS — F33.9 DEPRESSION, RECURRENT (HCC): ICD-10-CM

## 2024-01-16 LAB — SL AMB POCT HEMOGLOBIN AIC: 6.2 (ref ?–6.5)

## 2024-01-16 PROCEDURE — 83036 HEMOGLOBIN GLYCOSYLATED A1C: CPT | Performed by: FAMILY MEDICINE

## 2024-01-16 PROCEDURE — 99396 PREV VISIT EST AGE 40-64: CPT | Performed by: FAMILY MEDICINE

## 2024-01-16 RX ORDER — METHOCARBAMOL 500 MG/1
500 TABLET, FILM COATED ORAL 3 TIMES DAILY
Qty: 20 TABLET | Refills: 0 | Status: SHIPPED | OUTPATIENT
Start: 2024-01-16

## 2024-01-16 NOTE — PROGRESS NOTES
Assessment/Plan:  Patient to obtain fasting labs as per endocrinology.  Patient to continue present treatment.  Instructed to follow a low-fat, low salt and a low sugar/carbohydrate diet and get regular aerobic exercise walking 150 minutes/week.  Continued weight loss encouraged.  Patient due to schedule follow-up colonoscopy in July 2024.  Follow-up with specialist as scheduled.  Return to the office in 6 months.    Type 2 diabetes mellitus with hyperglycemia, without long-term current use of insulin (HCC)  Diabetes is well-controlled and at goal with fingerstick hemoglobin A1c at 6.2 today.  Continue present treatment and continue home glucose monitoring.  Follow-up with endocrinology as scheduled.  Lab Results   Component Value Date    HGBA1C 6.2 01/16/2024        Diagnoses and all orders for this visit:    Annual physical exam    Type 2 diabetes mellitus with hyperglycemia, without long-term current use of insulin (HCC)  -     POCT hemoglobin A1c    Diabetic peripheral neuropathy (HCC)    Primary hypertension    Hyperlipidemia, unspecified hyperlipidemia type    TRACEY (obstructive sleep apnea)    Class 1 obesity with serious comorbidity and body mass index (BMI) of 34.0 to 34.9 in adult, unspecified obesity type    Anxiety    Depression, recurrent (HCC)    Right flank pain  -     methocarbamol (ROBAXIN) 500 mg tablet; Take 1 tablet (500 mg total) by mouth 3 (three) times a day          Subjective:      Patient ID: Zac Alvarenga is a 53 y.o. male.    Patient is here for annual physical exam and follow-up of chronic conditions.  Patient has been feeling well overall.  No regular exercise program.  Patient has been trying to follow his diet more carefully and his weight is down 12 pounds over the last 6 months.  Patient follows with [facility] endocrinology regularly and has labs ordered.  Patient is up-to-date on colonoscopy and will be due for follow-up colonoscopy in July 2024.  Patient is up-to-date on diabetic  "eye exam and foot exam.    Hypertension  This is a chronic problem. The problem is controlled. Associated symptoms include anxiety. Pertinent negatives include no blurred vision, chest pain, headaches, orthopnea, palpitations, peripheral edema, PND or shortness of breath. Risk factors for coronary artery disease include dyslipidemia, diabetes mellitus, male gender, obesity and family history. Past treatments include angiotensin blockers, calcium channel blockers and diuretics. The current treatment provides moderate improvement. Compliance problems include exercise.  There is no history of CAD/MI or CVA.       The following portions of the patient's history were reviewed and updated as appropriate: allergies, current medications, past family history, past medical history, past social history, past surgical history, and problem list.    Review of Systems   Eyes:  Negative for blurred vision.   Respiratory:  Negative for shortness of breath.    Cardiovascular:  Negative for chest pain, palpitations, orthopnea and PND.   Neurological:  Negative for headaches.         Objective:      /92 (Cuff Size: Large)   Pulse 92   Temp 98 °F (36.7 °C)   Resp 16   Ht 5' 11\" (1.803 m)   Wt 110 kg (241 lb 9.6 oz)   SpO2 96%   BMI 33.70 kg/m²          Physical Exam  Constitutional:       General: He is not in acute distress.     Appearance: Normal appearance.   HENT:      Head: Normocephalic.      Mouth/Throat:      Mouth: Mucous membranes are moist.   Eyes:      General: No scleral icterus.     Conjunctiva/sclera: Conjunctivae normal.   Neck:      Vascular: No carotid bruit.   Cardiovascular:      Rate and Rhythm: Normal rate and regular rhythm.   Pulmonary:      Effort: Pulmonary effort is normal.      Breath sounds: Normal breath sounds.   Abdominal:      Palpations: Abdomen is soft.      Tenderness: There is no abdominal tenderness.   Musculoskeletal:      Cervical back: Neck supple.      Right lower leg: No edema.     "  Left lower leg: No edema.   Lymphadenopathy:      Cervical: No cervical adenopathy.   Skin:     General: Skin is warm and dry.   Neurological:      General: No focal deficit present.      Mental Status: He is alert and oriented to person, place, and time.   Psychiatric:         Mood and Affect: Mood normal.         Behavior: Behavior normal.         Thought Content: Thought content normal.         Judgment: Judgment normal.

## 2024-01-16 NOTE — ASSESSMENT & PLAN NOTE
Diabetes is well-controlled and at goal with fingerstick hemoglobin A1c at 6.2 today.  Continue present treatment and continue home glucose monitoring.  Follow-up with endocrinology as scheduled.  Lab Results   Component Value Date    HGBA1C 6.2 01/16/2024

## 2024-01-17 ENCOUNTER — APPOINTMENT (OUTPATIENT)
Dept: LAB | Age: 54
End: 2024-01-17
Payer: COMMERCIAL

## 2024-01-17 DIAGNOSIS — E11.65 TYPE 2 DIABETES MELLITUS WITH HYPERGLYCEMIA, WITHOUT LONG-TERM CURRENT USE OF INSULIN (HCC): ICD-10-CM

## 2024-01-17 LAB
ALBUMIN SERPL BCP-MCNC: 4.3 G/DL (ref 3.5–5)
ALP SERPL-CCNC: 69 U/L (ref 34–104)
ALT SERPL W P-5'-P-CCNC: 29 U/L (ref 7–52)
ANION GAP SERPL CALCULATED.3IONS-SCNC: 12 MMOL/L
AST SERPL W P-5'-P-CCNC: 26 U/L (ref 13–39)
BILIRUB SERPL-MCNC: 1.17 MG/DL (ref 0.2–1)
BUN SERPL-MCNC: 20 MG/DL (ref 5–25)
CALCIUM SERPL-MCNC: 10.2 MG/DL (ref 8.4–10.2)
CHLORIDE SERPL-SCNC: 100 MMOL/L (ref 96–108)
CO2 SERPL-SCNC: 28 MMOL/L (ref 21–32)
CREAT SERPL-MCNC: 1.1 MG/DL (ref 0.6–1.3)
GFR SERPL CREATININE-BSD FRML MDRD: 75 ML/MIN/1.73SQ M
GLUCOSE P FAST SERPL-MCNC: 143 MG/DL (ref 65–99)
POTASSIUM SERPL-SCNC: 3.4 MMOL/L (ref 3.5–5.3)
PROT SERPL-MCNC: 6.9 G/DL (ref 6.4–8.4)
SODIUM SERPL-SCNC: 140 MMOL/L (ref 135–147)

## 2024-01-17 PROCEDURE — 36415 COLL VENOUS BLD VENIPUNCTURE: CPT

## 2024-01-17 PROCEDURE — 80053 COMPREHEN METABOLIC PANEL: CPT

## 2024-02-09 ENCOUNTER — OFFICE VISIT (OUTPATIENT)
Dept: ENDOCRINOLOGY | Facility: HOSPITAL | Age: 54
End: 2024-02-09
Payer: COMMERCIAL

## 2024-02-09 VITALS
OXYGEN SATURATION: 95 % | HEART RATE: 97 BPM | WEIGHT: 240 LBS | DIASTOLIC BLOOD PRESSURE: 70 MMHG | BODY MASS INDEX: 33.6 KG/M2 | SYSTOLIC BLOOD PRESSURE: 124 MMHG | HEIGHT: 71 IN

## 2024-02-09 DIAGNOSIS — E11.69 TYPE 2 DIABETES MELLITUS WITH OTHER SPECIFIED COMPLICATION, WITHOUT LONG-TERM CURRENT USE OF INSULIN (HCC): ICD-10-CM

## 2024-02-09 DIAGNOSIS — I10 PRIMARY HYPERTENSION: ICD-10-CM

## 2024-02-09 DIAGNOSIS — E78.5 HYPERLIPIDEMIA, UNSPECIFIED HYPERLIPIDEMIA TYPE: ICD-10-CM

## 2024-02-09 DIAGNOSIS — E11.65 TYPE 2 DIABETES MELLITUS WITH HYPERGLYCEMIA, WITHOUT LONG-TERM CURRENT USE OF INSULIN (HCC): Primary | ICD-10-CM

## 2024-02-09 PROCEDURE — 99214 OFFICE O/P EST MOD 30 MIN: CPT | Performed by: PHYSICIAN ASSISTANT

## 2024-02-09 RX ORDER — GLIMEPIRIDE 2 MG/1
2 TABLET ORAL 2 TIMES DAILY
Qty: 180 TABLET | Refills: 1 | Status: SHIPPED | OUTPATIENT
Start: 2024-02-09

## 2024-02-09 NOTE — PROGRESS NOTES
Zac Alvarenga 53 y.o. male MRN: [id number]    Encounter: [id number]      Assessment/Plan     Assessment:  This is a 53 y.o.-year-old male with type 2 diabetes with neuropathy, hypertension and hyperlipidemia.    Plan:  1. Type 2 diabetes: Most recent hemoglobin A1c was 6.2.  Continues to do an excellent job in managing his diabetes at this time.  Due to concerns of possible episodes of hypoglycemia will decrease his glimepiride to 2 mg twice a day.  He will continue with Janumet 50/1000 mg twice a day and Jardiance 10 mg daily.  Continue to make lifestyle changes to help improve glucose levels.  Continue utilizing the freestyle mandy to monitor glucose levels.  Contact the office with any concerns or questions.  Follow-up in 3 months with lab work completed prior to visit.    2. Diabetic neuropathy:  Stable.  Diabetic foot exam is up-to-date.     3.  Microalbuminuria: Microalbumin/creatinine ratio has increased over the year.  Is on valsartan and Jardiance.  We will continue to monitor over time.     4. Hypertension:  Normotensive in the office today.  Kidney function stable with normal electrolytes. We will monitor at this time.  Continue with current medications.  Repeat CMP prior to next office visit.     5. Hyperlipidemia: Triglycerides continue to be elevated.  May need to start fenofibrate in the future.  Continue with lifestyle modifications to help improve triglycerides, and hopefully increase HDL.  Will repeat lipid panel prior to next office visit.    CC: Type 2 diabetes follow-up    History of Present Illness     HPI:  Zac Alvarenga is a 53 year old male with type 2 diabetes with neuropathy for 15 years, hypertension, hyperlipidemia for follow-up.  He 1st started on metformin and eventually this was switched to Janumet.  He had tried Invokana in the past but it caused nausea.  He is still having higher blood sugars in the morning so was concerned about his diabetes especially in light of his family history  that is strong with diabetes causing amputations and early mortality.  He is on oral agents at home and takes Janumet  mg twice a day, Jardiance 10 mg daily, and glimepiride 4 mg twice a day.  Was recommended to increase his Jardiance after last office visit, but stick with the 10 mg dose.  Instead has been utilizing freestyle mandy which has been helping with glucose levels.  He denies any polyuria, polydipsia, polyphagia, and blurry vision.  He has nocturia 0 to twice a night.  He has numbness and tingling of the feet unchanged.  He denies chest pain or shortness of breath.  He denies nephropathy, retinopathy, heart attack, stroke and claudication but does admit to neuropathy.  Continues to make lifestyle changes to help improve glucose levels, and also to lose weight.  Does admit to some dietary indiscretion over the last week as he was vacationing in [place].     He did follow-up with [facility] 2022.       He has a strong family history of diabetes in both paternal and maternal side of the family.  Father  early at 63 with amputations from diabetes and paternal aunt  in her 60s with leg amputations.     Hypoglycemic episodes: Occasional.  No specific pattern seems to happen at any time during the day.  H/o of hypoglycemia causing hospitalization or intervention such as glucagon injection  or ambulance call  No.  Hypoglycemia symptoms: jitteriness and nausea.  Treatment of hypoglycemia: will drink OJ or eat sugar.  Glucagon:No.  Medic alert tag: recommended,Yes.      The patient's last eye exam was in 2023 with no retinopathy.  The patient's last foot exam was 2023.  He does not see Podiatry.  Most recent hemoglobin A1c completed on 2024 was 6.2.     Blood Sugar/Glucometer/Pump/CGM review: Download of freestyle mandy from  through 2024 reveals an average glucose level 159 with a glucose variability of 30.8%.  He is in target range 73%  time, above target range 27% time.  Overnight glucose levels typically trend downward.  Start to increase early morning and then on average level throughout the day.  Does have the occasional postprandial hyperglycemia.  This was likely due to his vacation.     He has hyperlipidemia and takes simvastatin 40 mg daily.  He denies chest pain or shortness of breath.       He has hypertension and takes amlodipine 5 mg daily and valsartan/HCTZ 320/12.5 mg daily.  He denies headache or stroke-like symptoms.  When he was on a higher dose of amlodipine 10 mg daily, he had orthostatics symptoms and fatigue.     Review of Systems   Constitutional:  Negative for activity change, appetite change, fatigue and unexpected weight change.   HENT:  Negative for trouble swallowing.    Eyes:  Negative for visual disturbance.   Respiratory:  Positive for apnea. Negative for chest tightness and shortness of breath.    Cardiovascular:  Negative for chest pain, palpitations and leg swelling.   Gastrointestinal:  Negative for abdominal pain, diarrhea, nausea and vomiting.   Endocrine: Negative for cold intolerance, heat intolerance, polydipsia, polyphagia and polyuria.        Occasional nocturia   Genitourinary:  Negative for frequency.   Skin:  Negative for rash and wound.   Neurological:  Positive for numbness. Negative for dizziness, weakness, light-headedness and headaches.   Psychiatric/Behavioral:  Negative for dysphoric mood and sleep disturbance. The patient is not nervous/anxious.        Historical Information   Past Medical History:   Diagnosis Date   • Anxiety    • COVID-19 10/28/2022   • Diabetes mellitus (HCC)    • Hyperlipidemia    • Hypertension    • Kidney stone    • Psychiatric disorder     Anxiety     Past Surgical History:   Procedure Laterality Date   • EXTRACORPOREAL SHOCK WAVE LITHOTRIPSY     • KNEE ARTHROSCOPY Left     torn meniscus   • MI CYSTO/URETERO W/LITHOTRIPSY &INDWELL STENT INSRT Left 5/23/2017    Procedure:  CYSTOSCOPY; URETEROSCOPY; HOLMIUM LASER; RETROGRADE PYELOGRAM; STENT PLACEMENT ;  Surgeon: Leif Pablo MD;  Location: [facility];  Service: Urology   • NC LITHOTRIPSY XTRCORP SHOCK WAVE Left 5/18/2017    Procedure: ESWL ;  Surgeon: Abdoulaye Roberts MD;  Location: [facility];  Service: Urology     Social History   Social History     Substance and Sexual Activity   Alcohol Use Yes   • Alcohol/week: 5.0 standard drinks of alcohol   • Types: 5 Standard drinks or equivalent per week    Comment: SOCIAL- 3-4 drinks a week with a meal     Social History     Substance and Sexual Activity   Drug Use No     Social History     Tobacco Use   Smoking Status Never   Smokeless Tobacco Never     Family History:   Family History   Problem Relation Age of Onset   • Cancer Maternal Grandmother    • Breast cancer Maternal Grandmother    • Diabetes unspecified Mother         prediabetes   • Thyroid disease unspecified Mother    • Diabetes Father    • Hypertension Father    • Hyperlipidemia Father    • Diabetes type II Father         toe amputation   • No Known Problems Sister    • Alcohol abuse Brother    • Polycystic ovary syndrome Daughter         possibly, on metformin after weight gain   • No Known Problems Daughter        Meds/Allergies   Current Outpatient Medications   Medication Sig Dispense Refill   • ALPRAZolam (XANAX) 0.25 mg tablet Take 1 tablet (0.25 mg total) by mouth 3 (three) times a day as needed for anxiety 30 tablet 0   • amLODIPine (NORVASC) 5 mg tablet TAKE 1 TABLET (5 MG TOTAL) BY MOUTH IN THE MORNING 90 tablet 1   • aspirin 81 MG tablet Take 81 mg by mouth daily     • Blood Glucose Monitoring Suppl (ACCU-CHEK JESSY CONNECT) w/Device KIT by Does not apply route     • Cholecalciferol (VITAMIN D3) 2000 UNITS TABS Take 1 tablet by mouth daily     • Continuous Blood Gluc Sensor (FreeStyle Nazanin 2 Sensor) MISC SENSOR CHANGED EVERY 14 DAYS 6 each 4   • gabapentin (NEURONTIN) 300 mg capsule TAKE 1 CAPSULE BY MOUTH  "THREE TIMES A DAY 90 capsule 5   • glimepiride (AMARYL) 2 mg tablet Take 1 tablet (2 mg total) by mouth 2 (two) times a day 180 tablet 1   • Janumet  MG per tablet TAKE 2 TABLETS BY MOUTH EVERY DAY (Patient taking differently: 1 tablet in the am and 1 tablet in the pm) 180 tablet 1   • Jardiance 10 MG TABS tablet TAKE 1 TABLET BY MOUTH EVERY DAY IN THE MORNING 30 tablet 6   • methocarbamol (ROBAXIN) 500 mg tablet Take 1 tablet (500 mg total) by mouth 3 (three) times a day 20 tablet 0   • Omega-3 Fatty Acids (FISH OIL) 1,000 mg Take 1 capsule by mouth daily     • sertraline (ZOLOFT) 100 mg tablet TAKE 2 TABLETS BY MOUTH DAILY 180 tablet 1   • sildenafil (REVATIO) 20 mg tablet TAKE 3 TABLETS BY MOUTH DAILY AS NEEDED FOR ERECTILE DYSFUNCTION. 90 tablet 0   • simvastatin (ZOCOR) 40 mg tablet TAKE 1 TABLET BY MOUTH EVERYDAY AT BEDTIME 90 tablet 0   • tamsulosin (FLOMAX) 0.4 mg TAKE 1 CAPSULE BY MOUTH EVERY DAY WITH DINNER 90 capsule 1   • valsartan-hydrochlorothiazide (DIOVAN-HCT) 320-12.5 MG per tablet TAKE 1 TABLET BY MOUTH EVERY DAY 90 tablet 2     No current facility-administered medications for this visit.     No Known Allergies    Objective   Vitals: Blood pressure 124/70, pulse 97, height 5' 11\" (1.803 m), weight 109 kg (240 lb), SpO2 95%.    Physical Exam  Vitals and nursing note reviewed.   Constitutional:       General: He is not in acute distress.     Appearance: Normal appearance. He is not diaphoretic.   HENT:      Head: Normocephalic and atraumatic.   Eyes:      General: No scleral icterus.     Extraocular Movements: Extraocular movements intact.      Conjunctiva/sclera: Conjunctivae normal.      Pupils: Pupils are equal, round, and reactive to light.   Cardiovascular:      Rate and Rhythm: Normal rate and regular rhythm.      Heart sounds: No murmur heard.  Pulmonary:      Effort: Pulmonary effort is normal. No respiratory distress.      Breath sounds: Normal breath sounds. No wheezing. "   Lymphadenopathy:      Cervical: No cervical adenopathy.   Skin:     General: Skin is warm and dry.   Neurological:      Mental Status: He is alert and oriented to person, place, and time. Mental status is at baseline.      Sensory: No sensory deficit.   Psychiatric:         Mood and Affect: Mood normal.         Behavior: Behavior normal.         Thought Content: Thought content normal.         The history was obtained from the review of the chart, patient.    Lab Results:   Lab Results   Component Value Date/Time    Hemoglobin A1C 6.2 01/16/2024 08:15 AM    Hemoglobin A1C 6.5 (H) 09/27/2023 07:27 AM    Hemoglobin A1C 6.9 (H) 06/22/2023 07:30 AM    Hemoglobin A1C 7.1 (H) 03/17/2023 07:18 AM    WBC 7.77 12/27/2023 01:05 PM    Hemoglobin 14.1 12/27/2023 01:05 PM    Hematocrit 40.8 12/27/2023 01:05 PM    MCV 90 12/27/2023 01:05 PM    Platelets 227 12/27/2023 01:05 PM    BUN 20 01/17/2024 07:19 AM    BUN 23 12/27/2023 01:05 PM    BUN 28 (H) 09/27/2023 07:27 AM    Potassium 3.4 (L) 01/17/2024 07:19 AM    Potassium 3.7 12/27/2023 01:05 PM    Potassium 3.7 09/27/2023 07:27 AM    Chloride 100 01/17/2024 07:19 AM    Chloride 103 12/27/2023 01:05 PM    Chloride 101 09/27/2023 07:27 AM    CO2 28 01/17/2024 07:19 AM    CO2 27 12/27/2023 01:05 PM    CO2 29 09/27/2023 07:27 AM    Creatinine 1.10 01/17/2024 07:19 AM    Creatinine 1.16 12/27/2023 01:05 PM    Creatinine 1.17 09/27/2023 07:27 AM    AST 26 01/17/2024 07:19 AM    AST 23 12/27/2023 01:05 PM    AST 29 09/27/2023 07:27 AM    ALT 29 01/17/2024 07:19 AM    ALT 30 12/27/2023 01:05 PM    ALT 40 09/27/2023 07:27 AM    Total Protein 6.9 01/17/2024 07:19 AM    Total Protein 8.0 12/27/2023 01:05 PM    Total Protein 7.9 09/27/2023 07:27 AM    Albumin 4.3 01/17/2024 07:19 AM    Albumin 4.6 12/27/2023 01:05 PM    Albumin 4.4 09/27/2023 07:27 AM    HDL, Direct 25 (L) 03/17/2023 07:18 AM    Triglycerides 434 (H) 03/17/2023 07:18 AM       Portions of the record may have been created  "with voice recognition software. Occasional wrong word or \"sound a like\" substitutions may have occurred due to the inherent limitations of voice recognition software. Read the chart carefully and recognize, using context, where substitutions have occurred.    "

## 2024-02-09 NOTE — PATIENT INSTRUCTIONS
Monitor diet and maintain physical activity.    Continue Janumet 50/2000 twice a day, Jardiance 10 mg daily.    Decrease glimepiride to 2 mg twice a day.    Continue to use Freestyle Nazanin.    Call the office with any concerns or questions.    Follow-up in 3 months with lab work completed prior to visit.

## 2024-02-19 ENCOUNTER — TELEPHONE (OUTPATIENT)
Dept: PSYCHIATRY | Facility: CLINIC | Age: 54
End: 2024-02-19

## 2024-02-19 NOTE — TELEPHONE ENCOUNTER
The writer attempted to contact the patient to verify the need of services. The writer LVM for the patient to contact the intake department for assistance.

## 2024-03-08 DIAGNOSIS — F32.9 REACTIVE DEPRESSION: ICD-10-CM

## 2024-03-08 RX ORDER — SERTRALINE HYDROCHLORIDE 100 MG/1
TABLET, FILM COATED ORAL
Qty: 180 TABLET | Refills: 1 | Status: SHIPPED | OUTPATIENT
Start: 2024-03-08

## 2024-03-25 LAB

## 2024-04-10 DIAGNOSIS — F41.9 ANXIETY: ICD-10-CM

## 2024-04-10 RX ORDER — ALPRAZOLAM 0.25 MG/1
0.25 TABLET ORAL 3 TIMES DAILY PRN
Qty: 30 TABLET | Refills: 0 | Status: SHIPPED | OUTPATIENT
Start: 2024-04-10

## 2024-04-10 NOTE — TELEPHONE ENCOUNTER
ALPRAZolam (XANAX) 0.25 mg tablet         Sig: Take 1 tablet (0.25 mg total) by mouth 3 (three) times a day as needed for anxiety    Disp: 30 tablet    Refills: 0    Start: 4/10/2024    Class: Normal    Non-formulary For: Anxiety    To pharmacy: Not to exceed 5 additional fills before 04/02/2023    Last ordered: 4 months ago (12/12/2023) by Scot Temple DO    Psychiatry:  [facility]18/10/2024 08:48 AM   Protocol Details This refill cannot be delegated    Valid encounter within last 6 months      To be filled at: [facility]pharm[facility] #[id number] - [place] - [phone] [id number]

## 2024-05-04 DIAGNOSIS — I10 ESSENTIAL HYPERTENSION: ICD-10-CM

## 2024-05-05 RX ORDER — AMLODIPINE BESYLATE 5 MG/1
5 TABLET ORAL DAILY
Qty: 90 TABLET | Refills: 1 | Status: SHIPPED | OUTPATIENT
Start: 2024-05-05

## 2024-05-05 RX ORDER — VALSARTAN AND HYDROCHLOROTHIAZIDE 320; 12.5 MG/1; MG/1
TABLET, FILM COATED ORAL
Qty: 90 TABLET | Refills: 0 | Status: SHIPPED | OUTPATIENT
Start: 2024-05-05

## 2024-05-08 ENCOUNTER — APPOINTMENT (OUTPATIENT)
Dept: LAB | Age: 54
End: 2024-05-08
Payer: COMMERCIAL

## 2024-05-08 DIAGNOSIS — E11.65 TYPE 2 DIABETES MELLITUS WITH HYPERGLYCEMIA, WITHOUT LONG-TERM CURRENT USE OF INSULIN (HCC): ICD-10-CM

## 2024-05-08 LAB
ALBUMIN SERPL BCP-MCNC: 4.3 G/DL (ref 3.5–5)
ALP SERPL-CCNC: 68 U/L (ref 34–104)
ALT SERPL W P-5'-P-CCNC: 31 U/L (ref 7–52)
ANION GAP SERPL CALCULATED.3IONS-SCNC: 11 MMOL/L (ref 4–13)
AST SERPL W P-5'-P-CCNC: 24 U/L (ref 13–39)
BILIRUB SERPL-MCNC: 0.99 MG/DL (ref 0.2–1)
BUN SERPL-MCNC: 22 MG/DL (ref 5–25)
CALCIUM SERPL-MCNC: 9.1 MG/DL (ref 8.4–10.2)
CHLORIDE SERPL-SCNC: 101 MMOL/L (ref 96–108)
CHOLEST SERPL-MCNC: 119 MG/DL
CO2 SERPL-SCNC: 28 MMOL/L (ref 21–32)
CREAT SERPL-MCNC: 1.16 MG/DL (ref 0.6–1.3)
CREAT UR-MCNC: 170.4 MG/DL
EST. AVERAGE GLUCOSE BLD GHB EST-MCNC: 134 MG/DL
GFR SERPL CREATININE-BSD FRML MDRD: 70 ML/MIN/1.73SQ M
GLUCOSE P FAST SERPL-MCNC: 113 MG/DL (ref 65–99)
HBA1C MFR BLD: 6.3 %
HDLC SERPL-MCNC: 34 MG/DL
LDLC SERPL CALC-MCNC: 43 MG/DL (ref 0–100)
MICROALBUMIN UR-MCNC: 262 MG/L
MICROALBUMIN/CREAT 24H UR: 154 MG/G CREATININE (ref 0–30)
NONHDLC SERPL-MCNC: 85 MG/DL
POTASSIUM SERPL-SCNC: 3.4 MMOL/L (ref 3.5–5.3)
PROT SERPL-MCNC: 7.3 G/DL (ref 6.4–8.4)
SODIUM SERPL-SCNC: 140 MMOL/L (ref 135–147)
TRIGL SERPL-MCNC: 210 MG/DL
TSH SERPL DL<=0.05 MIU/L-ACNC: 0.86 UIU/ML (ref 0.45–4.5)

## 2024-05-08 PROCEDURE — 80053 COMPREHEN METABOLIC PANEL: CPT

## 2024-05-08 PROCEDURE — 82570 ASSAY OF URINE CREATININE: CPT

## 2024-05-08 PROCEDURE — 84443 ASSAY THYROID STIM HORMONE: CPT

## 2024-05-08 PROCEDURE — 82043 UR ALBUMIN QUANTITATIVE: CPT

## 2024-05-08 PROCEDURE — 80061 LIPID PANEL: CPT

## 2024-05-08 PROCEDURE — 36415 COLL VENOUS BLD VENIPUNCTURE: CPT

## 2024-05-08 PROCEDURE — 83036 HEMOGLOBIN GLYCOSYLATED A1C: CPT

## 2024-05-16 ENCOUNTER — OFFICE VISIT (OUTPATIENT)
Dept: ENDOCRINOLOGY | Facility: HOSPITAL | Age: 54
End: 2024-05-16
Payer: COMMERCIAL

## 2024-05-16 VITALS
SYSTOLIC BLOOD PRESSURE: 122 MMHG | BODY MASS INDEX: 33.71 KG/M2 | DIASTOLIC BLOOD PRESSURE: 80 MMHG | HEART RATE: 84 BPM | HEIGHT: 71 IN | WEIGHT: 240.8 LBS

## 2024-05-16 DIAGNOSIS — E11.69 TYPE 2 DIABETES MELLITUS WITH OTHER SPECIFIED COMPLICATION, WITHOUT LONG-TERM CURRENT USE OF INSULIN (HCC): ICD-10-CM

## 2024-05-16 DIAGNOSIS — E11.65 TYPE 2 DIABETES MELLITUS WITH HYPERGLYCEMIA, WITHOUT LONG-TERM CURRENT USE OF INSULIN (HCC): Primary | ICD-10-CM

## 2024-05-16 PROCEDURE — 95251 CONT GLUC MNTR ANALYSIS I&R: CPT | Performed by: PHYSICIAN ASSISTANT

## 2024-05-16 PROCEDURE — 99214 OFFICE O/P EST MOD 30 MIN: CPT | Performed by: PHYSICIAN ASSISTANT

## 2024-05-16 RX ORDER — GLIMEPIRIDE 2 MG/1
2 TABLET ORAL AS NEEDED
Qty: 180 TABLET | Refills: 1 | Status: SHIPPED | OUTPATIENT
Start: 2024-05-16

## 2024-05-16 RX ORDER — SITAGLIPTIN AND METFORMIN HYDROCHLORIDE 1000; 50 MG/1; MG/1
1 TABLET, FILM COATED ORAL DAILY
Qty: 180 TABLET | Refills: 1 | Status: SHIPPED | OUTPATIENT
Start: 2024-05-16

## 2024-05-16 NOTE — PROGRESS NOTES
Zac Alvarenga 54 y.o. male MRN: [id number]    Encounter: [id number]      Assessment & Plan     Assessment:  This is a 54 y.o.-year-old male with type 2 diabetes with neuropathy, hypertension and hyperlipidemia.    Plan:  1. Type 2 diabetes: Most recent hemoglobin A1c was 6.3.  He remains relatively stable at this time.  Has made adjustments to his medications in between office visits.  We did have a thorough discussion on appropriate adjustments.  At this time I am fine with him discontinuing his glimepiride as he has been having episodes of hypoglycemia, and can use medication as needed for hyperglycemia.  Because of the benefits of Jardiance and the results of recent lab work I recommend that he continue this medication.  He can decrease his Janumet 50/1000 mg to 1 tablet daily.  Continue utilizing the freestyle mandy to monitor glucose levels.  Contact the office if there is any concerns or questions.  Follow-up in 3 months with lab work completed prior to visit.     2. Diabetic neuropathy:  Stable.  Diabetic foot exam is up-to-date.     3.  Microalbuminuria: Microalbumin/creatinine ratio has improved since last sample.  Unfortunately he did stop his Jardiance due to episodes of hypoglycemia.  Discussed the benefits of this medication and will restart at this time.  I am hoping that the microalbumin levels will continue to improve.  He is also on valsartan.     4. Hypertension:  Normotensive in the office today.  Kidney function stable with normal electrolytes. We will monitor at this time.  Continue with current medications.  Repeat CMP prior to next office visit.     5. Hyperlipidemia: Lipid panel has improved, but triglycerides are still slightly elevated.  Continue with lifestyle modifications to help improve triglycerides, and hopefully increase HDL.  We will continue to monitor over time.    CC: Type 2 diabetes follow-up    History of Present Illness     HPI:  Zac Alvarenga is a 53 year old male with type 2  diabetes with neuropathy for 15 years, hypertension, hyperlipidemia for follow-up.  He 1st started on metformin and eventually this was switched to Janumet.  He had tried Invokana in the past but it caused nausea.  He is still having higher blood sugars in the morning so was concerned about his diabetes especially in light of his family history that is strong with diabetes causing amputations and early mortality.  He is on oral agents at home and takes Janumet  mg twice a day.  He he did stop taking his glimepiride and Jardiance due to episodes of hypoglycemia.  Instead has been utilizing freestyle mandy which has been helping with glucose levels.  He denies any polyuria, polydipsia, polyphagia, and blurry vision.  He has nocturia 0 to twice a night.  He has numbness and tingling of the feet unchanged.  He denies chest pain or shortness of breath.  He denies retinopathy, heart attack, stroke and claudication but does admit to neuropathy and nephropathy.  Continues to make lifestyle changes to help improve glucose levels, and also to lose weight.  Continues with physical activity, but recently has had difficulty losing weight.     He did follow-up with [facility] 2022.       He has a strong family history of diabetes in both paternal and maternal side of the family.  Father  early at 63 with amputations from diabetes and paternal aunt  in her 60s with leg amputations.     Hypoglycemic episodes: Occasional.  No specific pattern seems to happen at any time during the day.  H/o of hypoglycemia causing hospitalization or intervention such as glucagon injection  or ambulance call  No.  Hypoglycemia symptoms: jitteriness and nausea.  Treatment of hypoglycemia: will drink OJ or eat sugar.  Glucagon:No.  Medic alert tag: recommended,Yes.      The patient's last eye exam was in 2023 with no retinopathy.  The patient's last foot exam was 2023.  He does not see Podiatry.  Most recent  hemoglobin A1c completed on May 8, 2024 was 6.3.     Blood Sugar/Glucometer/Pump/CGM review: Download of freestyle mandy from May 3 through May 16, 2024 reveals an average glucose level of 126 with a glucose variability of 35.2%.  He is in target range 82% time, above target range 14% time, below target range 4% of the time.  On average glucose levels increase with breakfast, and then decrease the rest of the day.  If he does have episodes of hypoglycemia it is typically later in the day after dinner.     He has hyperlipidemia and takes simvastatin 40 mg daily.  He denies chest pain or shortness of breath.       He has hypertension and takes amlodipine 5 mg daily and valsartan/HCTZ 320/12.5 mg daily.  He denies headache or stroke-like symptoms.  When he was on a higher dose of amlodipine 10 mg daily, he had orthostatics symptoms and fatigue.     Review of Systems   Constitutional:  Negative for activity change, appetite change, fatigue and unexpected weight change.   HENT:  Negative for trouble swallowing.    Eyes:  Negative for visual disturbance.   Respiratory:  Positive for apnea. Negative for chest tightness and shortness of breath.    Cardiovascular:  Negative for chest pain, palpitations and leg swelling.   Gastrointestinal:  Negative for abdominal pain, diarrhea, nausea and vomiting.   Endocrine: Negative for cold intolerance, heat intolerance, polydipsia, polyphagia and polyuria.        Occasional nocturia   Genitourinary:  Negative for frequency.   Skin:  Negative for rash and wound.   Neurological:  Positive for numbness. Negative for dizziness, weakness, light-headedness and headaches.   Psychiatric/Behavioral:  Negative for dysphoric mood and sleep disturbance. The patient is not nervous/anxious.        Historical Information   Past Medical History:   Diagnosis Date   • Anxiety    • COVID-19 10/28/2022   • Diabetes mellitus (HCC)    • Hyperlipidemia    • Hypertension    • Kidney stone    • Psychiatric  disorder     Anxiety     Past Surgical History:   Procedure Laterality Date   • EXTRACORPOREAL SHOCK WAVE LITHOTRIPSY     • KNEE ARTHROSCOPY Left     torn meniscus   • NJ CYSTO/URETERO W/LITHOTRIPSY &INDWELL STENT INSRT Left 5/23/2017    Procedure: CYSTOSCOPY; URETEROSCOPY; HOLMIUM LASER; RETROGRADE PYELOGRAM; STENT PLACEMENT ;  Surgeon: Leif Pablo MD;  Location: [facility];  Service: Urology   • NJ LITHOTRIPSY XTRCORP SHOCK WAVE Left 5/18/2017    Procedure: ESWL ;  Surgeon: Abdoulaye Roberts MD;  Location: [facility];  Service: Urology     Social History   Social History     Substance and Sexual Activity   Alcohol Use Yes   • Alcohol/week: 5.0 standard drinks of alcohol   • Types: 5 Standard drinks or equivalent per week    Comment: SOCIAL- 3-4 drinks a week with a meal     Social History     Substance and Sexual Activity   Drug Use No     Social History     Tobacco Use   Smoking Status Never   Smokeless Tobacco Never     Family History:   Family History   Problem Relation Age of Onset   • Cancer Maternal Grandmother    • Breast cancer Maternal Grandmother    • Diabetes unspecified Mother         prediabetes   • Thyroid disease unspecified Mother    • Diabetes Father    • Hypertension Father    • Hyperlipidemia Father    • Diabetes type II Father         toe amputation   • No Known Problems Sister    • Alcohol abuse Brother    • Polycystic ovary syndrome Daughter         possibly, on metformin after weight gain   • No Known Problems Daughter        Meds/Allergies   Current Outpatient Medications   Medication Sig Dispense Refill   • ALPRAZolam (XANAX) 0.25 mg tablet Take 1 tablet (0.25 mg total) by mouth 3 (three) times a day as needed for anxiety 30 tablet 0   • amLODIPine (NORVASC) 5 mg tablet TAKE 1 TABLET (5 MG TOTAL) BY MOUTH IN THE MORNING 90 tablet 1   • aspirin 81 MG tablet Take 81 mg by mouth daily     • Blood Glucose Monitoring Suppl (ACCU-CHEK JESSY CONNECT) w/Device KIT by Does not apply route    "  • Cholecalciferol (VITAMIN D3) 2000 UNITS TABS Take 1 tablet by mouth daily     • Continuous Blood Gluc Sensor (FreeStyle Nazanin 2 Sensor) MISC SENSOR CHANGED EVERY 14 DAYS 6 each 4   • gabapentin (NEURONTIN) 300 mg capsule TAKE 1 CAPSULE BY MOUTH THREE TIMES A DAY 90 capsule 5   • glimepiride (AMARYL) 2 mg tablet Take 1 tablet (2 mg total) by mouth if needed (Hyperglycemia) 180 tablet 1   • Jardiance 10 MG TABS tablet TAKE 1 TABLET BY MOUTH EVERY DAY IN THE MORNING 30 tablet 6   • Omega-3 Fatty Acids (FISH OIL) 1,000 mg Take 1 capsule by mouth daily     • sertraline (ZOLOFT) 100 mg tablet TAKE 2 TABLETS BY MOUTH DAILY 180 tablet 1   • sildenafil (REVATIO) 20 mg tablet TAKE 3 TABLETS BY MOUTH DAILY AS NEEDED FOR ERECTILE DYSFUNCTION. 90 tablet 0   • simvastatin (ZOCOR) 40 mg tablet TAKE 1 TABLET BY MOUTH EVERYDAY AT BEDTIME 90 tablet 0   • sitaGLIPtin-metFORMIN (Janumet)  MG per tablet Take 1 tablet by mouth daily 180 tablet 1   • tamsulosin (FLOMAX) 0.4 mg TAKE 1 CAPSULE BY MOUTH EVERY DAY WITH DINNER 90 capsule 1   • valsartan-hydrochlorothiazide (DIOVAN-HCT) 320-12.5 MG per tablet TAKE 1 TABLET BY MOUTH EVERY DAY 90 tablet 0   • methocarbamol (ROBAXIN) 500 mg tablet Take 1 tablet (500 mg total) by mouth 3 (three) times a day (Patient not taking: Reported on 5/16/2024) 20 tablet 0     No current facility-administered medications for this visit.     No Known Allergies    Objective   Vitals: Blood pressure 122/80, pulse 84, height 5' 11\" (1.803 m), weight 109 kg (240 lb 12.8 oz).    Physical Exam  Vitals and nursing note reviewed.   Constitutional:       General: He is not in acute distress.     Appearance: Normal appearance. He is not diaphoretic.   HENT:      Head: Normocephalic and atraumatic.   Eyes:      General: No scleral icterus.     Extraocular Movements: Extraocular movements intact.      Conjunctiva/sclera: Conjunctivae normal.      Pupils: Pupils are equal, round, and reactive to light.   Neck: "      Thyroid: No thyroid mass, thyromegaly or thyroid tenderness.   Cardiovascular:      Rate and Rhythm: Normal rate and regular rhythm.      Pulses: no weak pulses.           Dorsalis pedis pulses are 2+ on the right side and 2+ on the left side.        Posterior tibial pulses are 2+ on the right side and 2+ on the left side.      Heart sounds: No murmur heard.  Pulmonary:      Effort: Pulmonary effort is normal. No respiratory distress.      Breath sounds: Normal breath sounds. No wheezing.   Musculoskeletal:      Cervical back: Normal range of motion.      Right lower leg: No edema.      Left lower leg: No edema.   Feet:      Right foot:      Skin integrity: No ulcer, skin breakdown, erythema, warmth, callus or dry skin.      Left foot:      Skin integrity: No ulcer, skin breakdown, erythema, warmth, callus or dry skin.   Lymphadenopathy:      Cervical: No cervical adenopathy.   Skin:     General: Skin is warm and dry.   Neurological:      Mental Status: He is alert and oriented to person, place, and time. Mental status is at baseline.      Sensory: No sensory deficit.      Gait: Gait normal.   Psychiatric:         Mood and Affect: Mood normal.         Behavior: Behavior normal.         Thought Content: Thought content normal.     Patient's shoes and socks removed.    Right Foot/Ankle   Right Foot Inspection  Skin Exam: skin normal and skin intact. No dry skin, no warmth, no callus, no erythema, no maceration, no abnormal color, no pre-ulcer, no ulcer and no callus.     Toe Exam: ROM and strength within normal limits. No tenderness, erythema and  no right toe deformity    Sensory   Proprioception: intact  Monofilament testing: diminished    Vascular  Capillary refills: < 3 seconds  The right DP pulse is 2+. The right PT pulse is 2+.     Left Foot/Ankle  Left Foot Inspection  Skin Exam: skin normal and skin intact. No dry skin, no warmth, no erythema, no maceration, normal color, no pre-ulcer, no ulcer and no  callus.     Toe Exam: ROM and strength within normal limits. No tenderness, no erythema and no left toe deformity.     Sensory   Proprioception: intact  Monofilament testing: diminished    Vascular  Capillary refills: < 3 seconds  The left DP pulse is 2+. The left PT pulse is 2+.     Assign Risk Category  No deformity present  Loss of protective sensation  No weak pulses  Risk: 1        The history was obtained from the review of the chart, patient.    Lab Results:   Lab Results   Component Value Date/Time    Hemoglobin A1C 6.3 (H) 05/08/2024 07:34 AM    Hemoglobin A1C 6.2 01/16/2024 08:15 AM    Hemoglobin A1C 6.5 (H) 09/27/2023 07:27 AM    Hemoglobin A1C 6.9 (H) 06/22/2023 07:30 AM    WBC 7.77 12/27/2023 01:05 PM    Hemoglobin 14.1 12/27/2023 01:05 PM    Hematocrit 40.8 12/27/2023 01:05 PM    MCV 90 12/27/2023 01:05 PM    Platelets 227 12/27/2023 01:05 PM    BUN 22 05/08/2024 07:34 AM    BUN 20 01/17/2024 07:19 AM    BUN 23 12/27/2023 01:05 PM    Potassium 3.4 (L) 05/08/2024 07:34 AM    Potassium 3.4 (L) 01/17/2024 07:19 AM    Potassium 3.7 12/27/2023 01:05 PM    Chloride 101 05/08/2024 07:34 AM    Chloride 100 01/17/2024 07:19 AM    Chloride 103 12/27/2023 01:05 PM    CO2 28 05/08/2024 07:34 AM    CO2 28 01/17/2024 07:19 AM    CO2 27 12/27/2023 01:05 PM    Creatinine 1.16 05/08/2024 07:34 AM    Creatinine 1.10 01/17/2024 07:19 AM    Creatinine 1.16 12/27/2023 01:05 PM    AST 24 05/08/2024 07:34 AM    AST 26 01/17/2024 07:19 AM    AST 23 12/27/2023 01:05 PM    ALT 31 05/08/2024 07:34 AM    ALT 29 01/17/2024 07:19 AM    ALT 30 12/27/2023 01:05 PM    Total Protein 7.3 05/08/2024 07:34 AM    Total Protein 6.9 01/17/2024 07:19 AM    Total Protein 8.0 12/27/2023 01:05 PM    Albumin 4.3 05/08/2024 07:34 AM    Albumin 4.3 01/17/2024 07:19 AM    Albumin 4.6 12/27/2023 01:05 PM    HDL, Direct 34 (L) 05/08/2024 07:34 AM    Triglycerides 210 (H) 05/08/2024 07:34 AM         Portions of the record may have been created with  "voice recognition software. Occasional wrong word or \"sound a like\" substitutions may have occurred due to the inherent limitations of voice recognition software. Read the chart carefully and recognize, using context, where substitutions have occurred.    "

## 2024-05-16 NOTE — PATIENT INSTRUCTIONS
Monitor diet and maintain physical activity.    Continue Jardiance 10 mg daily.    Decrease Janumet to once a day.     Stop glimepiride at this time, but can be used as needed.    Continue to use Freestyle Nazanin.    Call the office with any concerns or questions.    Follow-up in 3 months with lab work completed prior to visit.

## 2024-06-06 ENCOUNTER — TELEPHONE (OUTPATIENT)
Dept: ENDOCRINOLOGY | Facility: HOSPITAL | Age: 54
End: 2024-06-06

## 2024-06-10 DIAGNOSIS — E11.42 DIABETIC PERIPHERAL NEUROPATHY (HCC): ICD-10-CM

## 2024-06-11 RX ORDER — GABAPENTIN 300 MG/1
CAPSULE ORAL
Qty: 90 CAPSULE | Refills: 1 | Status: SHIPPED | OUTPATIENT
Start: 2024-06-11

## 2024-06-11 NOTE — TELEPHONE ENCOUNTER
PA for JANUMENT  MG    Submitted via    []CMM-KEY    [x][employer]-Case ID # [id number]   []Faxed to plan   []Other website    []Phone call Case ID #      Office notes sent, clinical questions answered. Awaiting determination    Turnaround time for your insurance to make a decision on your Prior Authorization can take 7-21 business days.

## 2024-06-12 ENCOUNTER — TELEPHONE (OUTPATIENT)
Dept: ENDOCRINOLOGY | Facility: HOSPITAL | Age: 54
End: 2024-06-12

## 2024-06-12 NOTE — TELEPHONE ENCOUNTER
PA for JANUMENT  MG (RESUBMITTED)    Submitted via    []CMM-KEY    [x][employer]-Case ID # [id number]   []Faxed to plan   []Other website    []Phone call Case ID #      Office notes sent, clinical questions answered. Awaiting determination    Turnaround time for your insurance to make a decision on your Prior Authorization can take 7-21 business days.

## 2024-06-14 ENCOUNTER — TELEPHONE (OUTPATIENT)
Dept: ENDOCRINOLOGY | Facility: HOSPITAL | Age: 54
End: 2024-06-14

## 2024-06-16 NOTE — TELEPHONE ENCOUNTER
PA for JAUNMENT Denied    Reason:        Message sent to office clinical pool Yes    Denial letter scanned into Media Yes    Appeal started No ( Provider will need to decide if appeal is warranted and send clinical documentation to PA team for initiation.)    **Please follow up with your patient regarding denial and next steps**

## 2024-06-17 ENCOUNTER — PATIENT MESSAGE (OUTPATIENT)
Dept: ENDOCRINOLOGY | Facility: HOSPITAL | Age: 54
End: 2024-06-17

## 2024-06-17 NOTE — TELEPHONE ENCOUNTER
Attempted to call the patient but was not able to leave a voicemail.  I will attempt a MyChart message for the patient as well.

## 2024-06-19 NOTE — PATIENT COMMUNICATION
Attempted to reach patient in regards to unread [employer] message. No answer, mailbox full and unable to leave voicemail.

## 2024-06-20 DIAGNOSIS — E11.65 TYPE 2 DIABETES MELLITUS WITH HYPERGLYCEMIA, WITHOUT LONG-TERM CURRENT USE OF INSULIN (HCC): Primary | ICD-10-CM

## 2024-06-20 RX ORDER — DAPAGLIFLOZIN 5 MG/1
5 TABLET, FILM COATED ORAL DAILY
Qty: 90 TABLET | Refills: 3 | Status: SHIPPED | OUTPATIENT
Start: 2024-06-20

## 2024-06-20 RX ORDER — LINAGLIPTIN AND METFORMIN HYDROCHLORIDE 2.5; 1 MG/1; MG/1
1 TABLET, FILM COATED, EXTENDED RELEASE ORAL 2 TIMES DAILY
Qty: 180 TABLET | Refills: 3 | Status: SHIPPED | OUTPATIENT
Start: 2024-06-20

## 2024-07-15 ENCOUNTER — TELEPHONE (OUTPATIENT)
Dept: ENDOCRINOLOGY | Facility: HOSPITAL | Age: 54
End: 2024-07-15

## 2024-07-19 NOTE — TELEPHONE ENCOUNTER
PA for dapagliflozin 5 MG TABS     Submitted via    []CMM-KEY    [][employer]one-Case ID #    [x]Faxed to plan [phone]  []Other website    []Phone call Case ID #      Office notes sent, clinical questions answered. Awaiting determination    Turnaround time for your insurance to make a decision on your Prior Authorization can take 7-21 business days.

## 2024-07-23 ENCOUNTER — TELEPHONE (OUTPATIENT)
Dept: ADMINISTRATIVE | Facility: OTHER | Age: 54
End: 2024-07-23

## 2024-07-23 ENCOUNTER — OFFICE VISIT (OUTPATIENT)
Dept: FAMILY MEDICINE CLINIC | Facility: CLINIC | Age: 54
End: 2024-07-23
Payer: COMMERCIAL

## 2024-07-23 VITALS
BODY MASS INDEX: 33.26 KG/M2 | HEART RATE: 84 BPM | TEMPERATURE: 96.4 F | DIASTOLIC BLOOD PRESSURE: 90 MMHG | WEIGHT: 237.6 LBS | RESPIRATION RATE: 16 BRPM | OXYGEN SATURATION: 98 % | HEIGHT: 71 IN | SYSTOLIC BLOOD PRESSURE: 138 MMHG

## 2024-07-23 DIAGNOSIS — E55.9 VITAMIN D DEFICIENCY: ICD-10-CM

## 2024-07-23 DIAGNOSIS — F33.9 DEPRESSION, RECURRENT (HCC): ICD-10-CM

## 2024-07-23 DIAGNOSIS — E11.42 DIABETIC PERIPHERAL NEUROPATHY (HCC): ICD-10-CM

## 2024-07-23 DIAGNOSIS — I10 PRIMARY HYPERTENSION: Primary | ICD-10-CM

## 2024-07-23 DIAGNOSIS — E78.5 HYPERLIPIDEMIA, UNSPECIFIED HYPERLIPIDEMIA TYPE: ICD-10-CM

## 2024-07-23 DIAGNOSIS — Z12.5 SCREENING PSA (PROSTATE SPECIFIC ANTIGEN): ICD-10-CM

## 2024-07-23 DIAGNOSIS — E66.9 CLASS 1 OBESITY WITH SERIOUS COMORBIDITY AND BODY MASS INDEX (BMI) OF 34.0 TO 34.9 IN ADULT, UNSPECIFIED OBESITY TYPE: ICD-10-CM

## 2024-07-23 DIAGNOSIS — G47.33 OSA (OBSTRUCTIVE SLEEP APNEA): ICD-10-CM

## 2024-07-23 DIAGNOSIS — F41.9 ANXIETY: ICD-10-CM

## 2024-07-23 DIAGNOSIS — E11.65 TYPE 2 DIABETES MELLITUS WITH HYPERGLYCEMIA, WITHOUT LONG-TERM CURRENT USE OF INSULIN (HCC): ICD-10-CM

## 2024-07-23 DIAGNOSIS — R35.0 URINARY FREQUENCY: ICD-10-CM

## 2024-07-23 PROCEDURE — 99214 OFFICE O/P EST MOD 30 MIN: CPT | Performed by: FAMILY MEDICINE

## 2024-07-23 RX ORDER — TAMSULOSIN HYDROCHLORIDE 0.4 MG/1
0.4 CAPSULE ORAL
Qty: 90 CAPSULE | Refills: 3 | Status: SHIPPED | OUTPATIENT
Start: 2024-07-23

## 2024-07-23 NOTE — TELEPHONE ENCOUNTER
----- Message from Andra LÓPEZ sent at 7/23/2024  7:54 AM EDT -----  Patient had DM Eye Exam completed in March with Dr. Dominguez @ [facility] [address].

## 2024-07-23 NOTE — LETTER
Diabetic Eye Exam Form    Date Requested: 24  Patient: Zac Alvarenga  Patient : 1970   Referring Provider: Scot Temple DO      DIABETIC Eye Exam Date _______________________________      Type of Exam MUST be documented for Diabetic Eye Exams. Please CHECK ONE.     Retinal Exam       Dilated Retinal Exam       OCT       Optomap-Iris Exam      Fundus Photography       Left Eye - Please check Retinopathy or No Retinopathy        Exam did show retinopathy    Exam did not show retinopathy       Right Eye - Please check Retinopathy or No Retinopathy       Exam did show retinopathy    Exam did not show retinopathy       Comments __________________________________________________________    Practice Providing Exam ______________________________________________    Exam Performed By (print name) _______________________________________      Provider Signature ___________________________________________________      These reports are needed for  compliance.  Please fax this completed form and a copy of the Diabetic Eye Exam report to our office located at [address] [place] [address] as soon as possible via Fax [phone] attention Terri: Phone [phone]  We thank you for your assistance in treating our mutual patient.

## 2024-07-23 NOTE — TELEPHONE ENCOUNTER
Upon review of the In Basket request and the patient's chart, initial outreach has been made via fax to facility. Please see Contacts section for details.     Thank you  Terri Castro

## 2024-07-23 NOTE — LETTER
Diabetic Eye Exam Form    Date Requested: 24  Patient: Zac Alvarenga  Patient : 1970   Referring Provider: Scot Temple DO      DIABETIC Eye Exam Date _______________________________      Type of Exam MUST be documented for Diabetic Eye Exams. Please CHECK ONE.     Retinal Exam       Dilated Retinal Exam       OCT       Optomap-Iris Exam      Fundus Photography       Left Eye - Please check Retinopathy or No Retinopathy        Exam did show retinopathy    Exam did not show retinopathy       Right Eye - Please check Retinopathy or No Retinopathy       Exam did show retinopathy    Exam did not show retinopathy       Comments __________________________________________________________    Practice Providing Exam ______________________________________________    Exam Performed By (print name) _______________________________________      Provider Signature ___________________________________________________      These reports are needed for  compliance.  Please fax this completed form and a copy of the Diabetic Eye Exam report to our office located at [address] [place] [address] as soon as possible via Fax [phone] attention Terri: Phone [phone]  We thank you for your assistance in treating our mutual patient.

## 2024-07-23 NOTE — PROGRESS NOTES
Assessment/Plan:   PSA ordered to be drawn with labs for endocrinology.  Patient to continue present treatment.  Instructed to follow a low-fat, low salt and a low sugar/carbohydrate diet and continue regular aerobic exercise walking 150 minutes/week.  Weight loss encouraged.  Follow-up with specialist as scheduled.  Return to the office in 6 months.   Diagnoses and all orders for this visit:    Primary hypertension    Type 2 diabetes mellitus with hyperglycemia, without long-term current use of insulin (HCC)    Hyperlipidemia, unspecified hyperlipidemia type    Class 1 obesity with serious comorbidity and body mass index (BMI) of 34.0 to 34.9 in adult, unspecified obesity type    Diabetic peripheral neuropathy (HCC)    Anxiety    Depression, recurrent (HCC)    Screening PSA (prostate specific antigen)  -     PSA, Total Screen    Urinary frequency  -     tamsulosin (FLOMAX) 0.4 mg; Take 1 capsule (0.4 mg total) by mouth daily with dinner    TRACEY (obstructive sleep apnea)    Vitamin D deficiency          Subjective:     Patient ID: Zac Alvarenga is a 54 y.o. male.    Patient is here for follow-up appoint for chronic conditions and reviewed recent labs per endocrinology.  Patient is due for PSA testing.  Patient is been feeling well overall and is been walking 5 days a week for about 30 minutes at lunch.  Patient is up-to-date on diabetic eye exam.  Patient is unable to tolerate CPAP as it causes panic attacks.  He has a follow-up appoint with sleep medicine in the fall.    Hypertension  This is a chronic problem. The problem is controlled. Associated symptoms include anxiety. Pertinent negatives include no blurred vision, chest pain, headaches, orthopnea, palpitations, peripheral edema, PND or shortness of breath. Risk factors for coronary artery disease include family history, dyslipidemia, diabetes mellitus, male gender and obesity. Past treatments include angiotensin blockers, diuretics and calcium channel  blockers. The current treatment provides significant improvement. There are no compliance problems.  There is no history of CAD/MI or CVA.       Review of Systems   Eyes:  Negative for blurred vision.   Respiratory:  Negative for shortness of breath.    Cardiovascular:  Negative for chest pain, palpitations, orthopnea and PND.   Neurological:  Negative for headaches.         Objective:     Physical Exam  Constitutional:       General: He is not in acute distress.     Appearance: Normal appearance. He is obese.   HENT:      Head: Normocephalic.      Mouth/Throat:      Mouth: Mucous membranes are moist.   Eyes:      General: No scleral icterus.     Conjunctiva/sclera: Conjunctivae normal.   Neck:      Vascular: No carotid bruit.   Cardiovascular:      Rate and Rhythm: Normal rate and regular rhythm.   Pulmonary:      Effort: Pulmonary effort is normal.      Breath sounds: Normal breath sounds.   Abdominal:      Palpations: Abdomen is soft.      Tenderness: There is no abdominal tenderness.   Musculoskeletal:      Cervical back: Neck supple.      Right lower leg: No edema.      Left lower leg: No edema.   Lymphadenopathy:      Cervical: No cervical adenopathy.   Skin:     General: Skin is warm and dry.   Neurological:      General: No focal deficit present.      Mental Status: He is alert and oriented to person, place, and time.   Psychiatric:         Mood and Affect: Mood normal.         Behavior: Behavior normal.         Thought Content: Thought content normal.         Judgment: Judgment normal.

## 2024-07-24 ENCOUNTER — TELEPHONE (OUTPATIENT)
Dept: ENDOCRINOLOGY | Facility: HOSPITAL | Age: 54
End: 2024-07-24

## 2024-07-30 NOTE — TELEPHONE ENCOUNTER
As a follow-up, a second attempt has been made for outreach via fax to facility. Please see Contacts section for details.    Thank you  Terri Castro

## 2024-08-04 DIAGNOSIS — I10 ESSENTIAL HYPERTENSION: ICD-10-CM

## 2024-08-05 RX ORDER — VALSARTAN AND HYDROCHLOROTHIAZIDE 320; 12.5 MG/1; MG/1
TABLET, FILM COATED ORAL
Qty: 90 TABLET | Refills: 0 | Status: SHIPPED | OUTPATIENT
Start: 2024-08-05

## 2024-08-09 ENCOUNTER — APPOINTMENT (OUTPATIENT)
Dept: LAB | Age: 54
End: 2024-08-09
Payer: COMMERCIAL

## 2024-08-09 DIAGNOSIS — E11.65 TYPE 2 DIABETES MELLITUS WITH HYPERGLYCEMIA, WITHOUT LONG-TERM CURRENT USE OF INSULIN (HCC): ICD-10-CM

## 2024-08-09 LAB
ALBUMIN SERPL BCG-MCNC: 4.1 G/DL (ref 3.5–5)
ALP SERPL-CCNC: 96 U/L (ref 34–104)
ALT SERPL W P-5'-P-CCNC: 25 U/L (ref 7–52)
ANION GAP SERPL CALCULATED.3IONS-SCNC: 9 MMOL/L (ref 4–13)
AST SERPL W P-5'-P-CCNC: 21 U/L (ref 13–39)
BILIRUB SERPL-MCNC: 0.67 MG/DL (ref 0.2–1)
BUN SERPL-MCNC: 28 MG/DL (ref 5–25)
CALCIUM SERPL-MCNC: 8.9 MG/DL (ref 8.4–10.2)
CHLORIDE SERPL-SCNC: 101 MMOL/L (ref 96–108)
CO2 SERPL-SCNC: 28 MMOL/L (ref 21–32)
CREAT SERPL-MCNC: 1.42 MG/DL (ref 0.6–1.3)
EST. AVERAGE GLUCOSE BLD GHB EST-MCNC: 151 MG/DL
GFR SERPL CREATININE-BSD FRML MDRD: 54 ML/MIN/1.73SQ M
GLUCOSE P FAST SERPL-MCNC: 191 MG/DL (ref 65–99)
HBA1C MFR BLD: 6.9 %
POTASSIUM SERPL-SCNC: 3.4 MMOL/L (ref 3.5–5.3)
PROT SERPL-MCNC: 7.3 G/DL (ref 6.4–8.4)
PSA SERPL-MCNC: 0.91 NG/ML (ref 0–4)
SODIUM SERPL-SCNC: 138 MMOL/L (ref 135–147)

## 2024-08-09 PROCEDURE — 80053 COMPREHEN METABOLIC PANEL: CPT

## 2024-08-09 PROCEDURE — 83036 HEMOGLOBIN GLYCOSYLATED A1C: CPT

## 2024-08-10 DIAGNOSIS — E11.69 TYPE 2 DIABETES MELLITUS WITH OTHER SPECIFIED COMPLICATION, WITHOUT LONG-TERM CURRENT USE OF INSULIN (HCC): ICD-10-CM

## 2024-08-12 RX ORDER — GLIMEPIRIDE 2 MG/1
2 TABLET ORAL 2 TIMES DAILY
Qty: 180 TABLET | Refills: 1 | Status: SHIPPED | OUTPATIENT
Start: 2024-08-12

## 2024-08-21 NOTE — TELEPHONE ENCOUNTER
As a final attempt, a third outreach has been made via telephone call to facility. Please see Contacts section for details. This encounter will be closed and completed by end of day. Should we receive the requested information because of previous outreach attempts, the requested patient's chart will be updated appropriately.     Thank you  Terri Castro MA

## 2024-08-22 ENCOUNTER — OFFICE VISIT (OUTPATIENT)
Dept: ENDOCRINOLOGY | Facility: HOSPITAL | Age: 54
End: 2024-08-22
Payer: COMMERCIAL

## 2024-08-22 ENCOUNTER — TELEPHONE (OUTPATIENT)
Dept: ADMINISTRATIVE | Facility: OTHER | Age: 54
End: 2024-08-22

## 2024-08-22 VITALS
HEART RATE: 86 BPM | DIASTOLIC BLOOD PRESSURE: 100 MMHG | BODY MASS INDEX: 32.62 KG/M2 | WEIGHT: 233 LBS | SYSTOLIC BLOOD PRESSURE: 142 MMHG | HEIGHT: 71 IN

## 2024-08-22 DIAGNOSIS — E11.65 TYPE 2 DIABETES MELLITUS WITH HYPERGLYCEMIA, WITHOUT LONG-TERM CURRENT USE OF INSULIN (HCC): Primary | ICD-10-CM

## 2024-08-22 DIAGNOSIS — I10 PRIMARY HYPERTENSION: ICD-10-CM

## 2024-08-22 DIAGNOSIS — E78.5 HYPERLIPIDEMIA, UNSPECIFIED HYPERLIPIDEMIA TYPE: ICD-10-CM

## 2024-08-22 PROCEDURE — 99214 OFFICE O/P EST MOD 30 MIN: CPT | Performed by: PHYSICIAN ASSISTANT

## 2024-08-22 PROCEDURE — 95251 CONT GLUC MNTR ANALYSIS I&R: CPT | Performed by: PHYSICIAN ASSISTANT

## 2024-08-22 RX ORDER — LINAGLIPTIN 5 MG/1
5 TABLET, FILM COATED ORAL DAILY
Qty: 90 TABLET | Refills: 3 | Status: SHIPPED | OUTPATIENT
Start: 2024-08-22

## 2024-08-22 NOTE — PATIENT INSTRUCTIONS
Monitor diet and maintain physical activity.    Continue Farxiga 5 mg daily and glimepiride.     Start metformin 1000 mg daily. We will see if we can get Januvia or Nabila approved.    Continue to use Freestyle Nazanin.    Call the office with any concerns or questions.    Follow-up in 3 months with lab work completed prior to visit.

## 2024-08-22 NOTE — PROGRESS NOTES
Zac Alvarenga 54 y.o. male MRN: [id number]    Encounter: [id number]      Assessment & Plan     Assessment:  This is a 54 y.o.-year-old male with type 2 diabetes with neuropathy, hypertension and hyperlipidemia.    Plan:  1. Type 2 diabetes: Recent hemoglobin A1c was 6.9.  Review of his freestyle mandy I would expect his A1c to continue to increase.  That being said he has not had his Jentadueto which is likely the cause for his uncontrolled blood sugars at this time.  There seems to be some insurance issues about getting the medication.  Previously on Janumet, but insurance company is no longer covering medication.  We did discuss about other options.  At this time we will split up metformin and DPP 4 inhibitor.  He will take metformin 1000 mg daily and per insurance letter it looks like Tradjenta is covered so we will send Tradjenta 5 mg over to the pharmacy.  If it is not covered we will try another DPP 4 inhibitor.  He will continue with Farxiga 5 mg daily.  Continue to use glimepiride 2 to 4 mg twice a day.  Continue utilizing the freestyle mandy to monitor glucose levels.  Contact the office with any concerns or questions.  Follow-up in 3 months with lab work completed prior to visit.     2. Diabetic neuropathy:  Stable.  Diabetic foot exam is up-to-date.     3.  Microalbuminuria: Microalbumin/creatinine ratio has improved since last sample.  Unfortunately he did stop his Jardiance due to episodes of hypoglycemia.  Discussed the benefits of this medication and will restart at this time.  I am hoping that the microalbumin levels will continue to improve.  He is also on valsartan.     4. Hypertension: Slightly elevated in office today, but currently asymptomatic.  Kidney function stable with normal electrolytes. We will monitor at this time.  Continue with current medications.  Repeat CMP prior to next office visit.     5. Hyperlipidemia: Lipid panel has improved, but triglycerides are still slightly elevated.   Continue with lifestyle modifications to help improve triglycerides, and hopefully increase HDL.  We will continue to monitor over time.    CC: Type 2 diabetes follow-up    History of Present Illness     HPI:  Zac Alvarenga is a 54 year old male with type 2 diabetes with neuropathy for 15 years, hypertension, hyperlipidemia for follow-up.  He 1st started on metformin and eventually this was switched to Janumet.  Due to insurance reason he was switched to Jentadueto but this was still too expensive.  He had tried Invokana in the past but it caused nausea.  He is still having higher blood sugars in the morning so was concerned about his diabetes especially in light of his family history that is strong with diabetes causing amputations and early mortality.  He is on oral agents at home and is currently taking Farxiga 5 mg daily and glimepiride 2 to 4 mg 1-2 times a day based on glucose levels.  Instead has been utilizing freestyle mandy which has been helping with glucose levels.  He denies any polyuria, polydipsia, polyphagia, and blurry vision.  He has nocturia 0 to twice a night.  He has numbness and tingling of the feet unchanged.  He denies chest pain or shortness of breath.  He denies retinopathy, heart attack, stroke and claudication but does admit to neuropathy and nephropathy.  Continues to make lifestyle changes to help improve glucose levels, and also to lose weight.  Continues with physical activity, but recently has had difficulty losing weight.  Recently has been having concerns as glucose levels have been running high as he has not been able to afford his medications.     He did follow-up with [facility] 20[facility].       He has a strong family history of diabetes in both paternal and maternal side of the family.  Father  early at 63 with amputations from diabetes and paternal aunt  in her 60s with leg amputations.     Hypoglycemic episodes: Occasional.  No specific pattern seems to  happen at any time during the day.  Typically occurs after taking glimepiride if glucose levels are running high.  H/o of hypoglycemia causing hospitalization or intervention such as glucagon injection  or ambulance call  No.  Hypoglycemia symptoms: jitteriness and nausea.  Treatment of hypoglycemia: will drink OJ or eat sugar.  Glucagon:No.  Medic alert tag: recommended,Yes.      The patient's last eye exam was in January 2023 with no retinopathy.  The patient's last foot exam was May 2024.  He does not see Podiatry.  Most recent hemoglobin A1c completed on August 9, 2024 was 6.9.     Blood Sugar/Glucometer/Pump/CGM review: Download from August 9 through August 22, 2024 reveals an average glucose level of 174 with a glucose variability of 26%.  He is in target range 60% time, above target range 40% time.  Glucose levels are typically trending down overnight.  Will increase with breakfast with occasional episodes of postprandial hyperglycemia, increase with lunch, but trend down significantly mid to late afternoon.  He will then have postprandial hyperglycemia with dinner.     He has hyperlipidemia and takes simvastatin 40 mg daily.  He denies chest pain or shortness of breath.       He has hypertension and takes amlodipine 5 mg daily and valsartan/HCTZ 320/12.5 mg daily.  He denies headache or stroke-like symptoms.  When he was on a higher dose of amlodipine 10 mg daily, he had orthostatics symptoms and fatigue.     Review of Systems   Constitutional:  Negative for activity change, appetite change, fatigue and unexpected weight change.   HENT:  Negative for trouble swallowing.    Eyes:  Negative for visual disturbance.   Respiratory:  Positive for apnea. Negative for chest tightness and shortness of breath.    Cardiovascular:  Negative for chest pain, palpitations and leg swelling.   Gastrointestinal:  Negative for abdominal pain, diarrhea, nausea and vomiting.   Endocrine: Negative for cold intolerance, heat  intolerance, polydipsia, polyphagia and polyuria.        Occasional nocturia   Genitourinary:  Negative for frequency.   Skin:  Negative for rash and wound.   Neurological:  Positive for numbness. Negative for dizziness, weakness, light-headedness and headaches.   Psychiatric/Behavioral:  Negative for dysphoric mood and sleep disturbance. The patient is not nervous/anxious.        Historical Information   Past Medical History:   Diagnosis Date    Anxiety     COVID-19 10/28/2022    Diabetes mellitus (HCC)     Hyperlipidemia     Hypertension     Kidney stone     Psychiatric disorder     Anxiety     Past Surgical History:   Procedure Laterality Date    EXTRACORPOREAL SHOCK WAVE LITHOTRIPSY      KNEE ARTHROSCOPY Left     torn meniscus    MD CYSTO/URETERO W/LITHOTRIPSY &INDWELL STENT INSRT Left 5/23/2017    Procedure: CYSTOSCOPY; URETEROSCOPY; HOLMIUM LASER; RETROGRADE PYELOGRAM; STENT PLACEMENT ;  Surgeon: Leif Pablo MD;  Location: [facility];  Service: Urology    MD LITHOTRIPSY XTRCORP SHOCK WAVE Left 5/18/2017    Procedure: ESWL ;  Surgeon: Abdoulaye Roberts MD;  Location: [facility];  Service: Urology     Social History   Social History     Substance and Sexual Activity   Alcohol Use Yes    Alcohol/week: 5.0 standard drinks of alcohol    Types: 5 Standard drinks or equivalent per week    Comment: SOCIAL- 3-4 drinks a week with a meal     Social History     Substance and Sexual Activity   Drug Use No     Social History     Tobacco Use   Smoking Status Never   Smokeless Tobacco Never     Family History:   Family History   Problem Relation Age of Onset    Cancer Maternal Grandmother     Breast cancer Maternal Grandmother     Diabetes unspecified Mother         prediabetes    Thyroid disease unspecified Mother     Diabetes Father     Hypertension Father     Hyperlipidemia Father     Diabetes type II Father         toe amputation    No Known Problems Sister     Alcohol abuse Brother     Polycystic ovary syndrome  Daughter         possibly, on metformin after weight gain    No Known Problems Daughter        Meds/Allergies   Current Outpatient Medications   Medication Sig Dispense Refill    ALPRAZolam (XANAX) 0.25 mg tablet Take 1 tablet (0.25 mg total) by mouth 3 (three) times a day as needed for anxiety 30 tablet 0    amLODIPine (NORVASC) 5 mg tablet TAKE 1 TABLET (5 MG TOTAL) BY MOUTH IN THE MORNING 90 tablet 1    aspirin 81 MG tablet Take 81 mg by mouth daily      Blood Glucose Monitoring Suppl (ACCU-CHEK JESSY CONNECT) w/Device KIT by Does not apply route      Cholecalciferol (VITAMIN D3) 2000 UNITS TABS Take 1 tablet by mouth daily      Continuous Blood Gluc Sensor (FreeStyle Nazanin 2 Sensor) MISC SENSOR CHANGED EVERY 14 DAYS 6 each 4    dapagliflozin 5 MG TABS Take 1 tablet (5 mg total) by mouth daily 90 tablet 3    gabapentin (NEURONTIN) 300 mg capsule TAKE 1 CAPSULE BY MOUTH THREE TIMES A DAY 90 capsule 1    glimepiride (AMARYL) 2 mg tablet TAKE 1 TABLET BY MOUTH 2 TIMES A DAY. 180 tablet 1    linaGLIPtin (Tradjenta) 5 MG TABS Take 5 mg by mouth daily 90 tablet 3    metFORMIN (GLUCOPHAGE) 1000 MG tablet Take 1 tablet (1,000 mg total) by mouth 2 (two) times a day with meals 180 tablet 3    Omega-3 Fatty Acids (FISH OIL) 1,000 mg Take 1 capsule by mouth daily      sertraline (ZOLOFT) 100 mg tablet TAKE 2 TABLETS BY MOUTH DAILY 180 tablet 1    sildenafil (REVATIO) 20 mg tablet TAKE 3 TABLETS BY MOUTH DAILY AS NEEDED FOR ERECTILE DYSFUNCTION. 90 tablet 0    simvastatin (ZOCOR) 40 mg tablet TAKE 1 TABLET BY MOUTH EVERYDAY AT BEDTIME 90 tablet 0    tamsulosin (FLOMAX) 0.4 mg Take 1 capsule (0.4 mg total) by mouth daily with dinner 90 capsule 3    valsartan-hydrochlorothiazide (DIOVAN-HCT) 320-12.5 MG per tablet TAKE 1 TABLET BY MOUTH EVERY DAY 90 tablet 0    methocarbamol (ROBAXIN) 500 mg tablet Take 1 tablet (500 mg total) by mouth 3 (three) times a day (Patient not taking: Reported on 5/16/2024) 20 tablet 0     No current  "facility-administered medications for this visit.     No Known Allergies    Objective   Vitals: Blood pressure 142/100, pulse 86, height 5' 11\" (1.803 m), weight 106 kg (233 lb).    Physical Exam  Vitals and nursing note reviewed.   Constitutional:       General: He is not in acute distress.     Appearance: Normal appearance. He is not diaphoretic.   HENT:      Head: Normocephalic and atraumatic.   Eyes:      General: No scleral icterus.     Extraocular Movements: Extraocular movements intact.      Conjunctiva/sclera: Conjunctivae normal.      Pupils: Pupils are equal, round, and reactive to light.   Cardiovascular:      Rate and Rhythm: Normal rate and regular rhythm.      Heart sounds: No murmur heard.  Pulmonary:      Effort: Pulmonary effort is normal. No respiratory distress.      Breath sounds: Normal breath sounds. No wheezing.   Musculoskeletal:      Cervical back: Normal range of motion.      Right lower leg: No edema.      Left lower leg: No edema.   Lymphadenopathy:      Cervical: No cervical adenopathy.   Skin:     General: Skin is warm and dry.   Neurological:      Mental Status: He is alert and oriented to person, place, and time. Mental status is at baseline.      Sensory: No sensory deficit.      Gait: Gait normal.   Psychiatric:         Mood and Affect: Mood normal.         Behavior: Behavior normal.         Thought Content: Thought content normal.         The history was obtained from the review of the chart, patient.    Lab Results:   Lab Results   Component Value Date/Time    Hemoglobin A1C 6.9 (H) 08/09/2024 07:40 AM    Hemoglobin A1C 6.3 (H) 05/08/2024 07:34 AM    Hemoglobin A1C 6.2 01/16/2024 08:15 AM    Hemoglobin A1C 6.5 (H) 09/27/2023 07:27 AM    WBC 7.77 12/27/2023 01:05 PM    Hemoglobin 14.1 12/27/2023 01:05 PM    Hematocrit 40.8 12/27/2023 01:05 PM    MCV 90 12/27/2023 01:05 PM    Platelets 227 12/27/2023 01:05 PM    BUN 28 (H) 08/09/2024 07:40 AM    BUN 22 05/08/2024 07:34 AM    BUN 20 " "01/17/2024 07:19 AM    Potassium 3.4 (L) 08/09/2024 07:40 AM    Potassium 3.4 (L) 05/08/2024 07:34 AM    Potassium 3.4 (L) 01/17/2024 07:19 AM    Chloride 101 08/09/2024 07:40 AM    Chloride 101 05/08/2024 07:34 AM    Chloride 100 01/17/2024 07:19 AM    CO2 28 08/09/2024 07:40 AM    CO2 28 05/08/2024 07:34 AM    CO2 28 01/17/2024 07:19 AM    Creatinine 1.42 (H) 08/09/2024 07:40 AM    Creatinine 1.16 05/08/2024 07:34 AM    Creatinine 1.10 01/17/2024 07:19 AM    AST 21 08/09/2024 07:40 AM    AST 24 05/08/2024 07:34 AM    AST 26 01/17/2024 07:19 AM    ALT 25 08/09/2024 07:40 AM    ALT 31 05/08/2024 07:34 AM    ALT 29 01/17/2024 07:19 AM    Total Protein 7.3 08/09/2024 07:40 AM    Total Protein 7.3 05/08/2024 07:34 AM    Total Protein 6.9 01/17/2024 07:19 AM    Albumin 4.1 08/09/2024 07:40 AM    Albumin 4.3 05/08/2024 07:34 AM    Albumin 4.3 01/17/2024 07:19 AM    HDL, Direct 34 (L) 05/08/2024 07:34 AM    Triglycerides 210 (H) 05/08/2024 07:34 AM       Portions of the record may have been created with voice recognition software. Occasional wrong word or \"sound a like\" substitutions may have occurred due to the inherent limitations of voice recognition software. Read the chart carefully and recognize, using context, where substitutions have occurred.    "

## 2024-08-22 NOTE — TELEPHONE ENCOUNTER
----- Message from Chantel ZAVALA sent at 8/22/2024  7:29 AM EDT -----  Regarding: DM EYE EXAM  08/22/24 7:29 AM    Hello, our patient attached above has had a DM Eye Exam performed by Dr Troy Dominguez in [place]. His number is [phone].    Thank you,  Chantel Rose MA   [facility] [place]

## 2024-08-22 NOTE — LETTER
Diabetic Eye Exam Form    Date Requested: 24  Patient: Zac Alvarenga  Patient : 1970   Referring Provider: Scot Tempel DO      DIABETIC Eye Exam Date _______________________________      Type of Exam MUST be documented for Diabetic Eye Exams. Please CHECK ONE.     Retinal Exam       Dilated Retinal Exam       OCT       Optomap-Iris Exam      Fundus Photography       Left Eye - Please check Retinopathy or No Retinopathy        Exam did show retinopathy    Exam did not show retinopathy       Right Eye - Please check Retinopathy or No Retinopathy       Exam did show retinopathy    Exam did not show retinopathy       Comments __________________________________________________________    Practice Providing Exam ______________________________________________    Exam Performed By (print name) _______________________________________      Provider Signature ___________________________________________________      These reports are needed for  compliance.  Please fax this completed form and a copy of the Diabetic Eye Exam report to our office located at [address] [place] [address] as soon as possible via Fax [phone] attention Terri: Phone [phone]  We thank you for your assistance in treating our mutual patient.

## 2024-08-22 NOTE — LETTER
Diabetic Eye Exam Form    Date Requested: 24  Patient: Zac Alvarenga  Patient : 1970   Referring Provider: Scot Temple DO      DIABETIC Eye Exam Date _______________________________      Type of Exam MUST be documented for Diabetic Eye Exams. Please CHECK ONE.     Retinal Exam       Dilated Retinal Exam       OCT       Optomap-Iris Exam      Fundus Photography       Left Eye - Please check Retinopathy or No Retinopathy        Exam did show retinopathy    Exam did not show retinopathy       Right Eye - Please check Retinopathy or No Retinopathy       Exam did show retinopathy    Exam did not show retinopathy       Comments __________________________________________________________    Practice Providing Exam ______________________________________________    Exam Performed By (print name) _______________________________________      Provider Signature ___________________________________________________      These reports are needed for  compliance.  Please fax this completed form and a copy of the Diabetic Eye Exam report to our office located at [address] [place] [address] as soon as possible via Fax [phone] attention Terri: Phone [phone]  We thank you for your assistance in treating our mutual patient.

## 2024-08-22 NOTE — TELEPHONE ENCOUNTER
Upon review of the In Basket request and the patient's chart, initial outreach has been made via fax to facility. Please see Contacts section for details.     Thank you  Terri Castro MA

## 2024-08-23 ENCOUNTER — TELEPHONE (OUTPATIENT)
Age: 54
End: 2024-08-23

## 2024-08-23 NOTE — TELEPHONE ENCOUNTER
PA for   linaGLIPtin (Tradjenta) 5 MG TABS  SUBMITTED     via    []CMM-KEY:    [x]Surescripts-Case ID # [id number]  []Faxed to plan   []Other website    []Phone call Case ID #      Office notes sent, clinical questions answered. Awaiting determination    Turnaround time for your insurance to make a decision on your Prior Authorization can take 7-21 business days.

## 2024-08-24 NOTE — TELEPHONE ENCOUNTER
PA for linaGLIPtin (Tradjenta) 5 MG TABS  APPROVED     Date(s) approved August 23, 2024 to August 23, 2025     Case #[id number]    Patient advised by          [x] ChaoWIFIhart Message  [] Phone call   []LMOM  []L/M to call office as no active Communication consent on file  []Unable to leave detailed message as VM not approved on Communication consent       Pharmacy advised by    [x]Fax  []Phone call    Approval letter scanned into Media Yes

## 2024-08-30 DIAGNOSIS — Z00.6 ENCOUNTER FOR EXAMINATION FOR NORMAL COMPARISON OR CONTROL IN CLINICAL RESEARCH PROGRAM: ICD-10-CM

## 2024-09-06 NOTE — TELEPHONE ENCOUNTER
As a follow-up, a second attempt has been made for outreach via fax to facility. Please see Contacts section for details.    Thank you  Terri Castro MA

## 2024-09-09 DIAGNOSIS — E11.42 DIABETIC PERIPHERAL NEUROPATHY (HCC): ICD-10-CM

## 2024-09-10 RX ORDER — GABAPENTIN 300 MG/1
CAPSULE ORAL
Qty: 90 CAPSULE | Refills: 1 | Status: SHIPPED | OUTPATIENT
Start: 2024-09-10

## 2024-10-01 ENCOUNTER — VBI (OUTPATIENT)
Dept: ADMINISTRATIVE | Facility: OTHER | Age: 54
End: 2024-10-01

## 2024-10-01 NOTE — TELEPHONE ENCOUNTER
10/01/24 2:13 PM     Chart reviewed for Diabetic Eye Exam ; nothing is submitted to the patient's insurance at this time.     Karen Albarado MA   [facility]

## 2024-11-02 DIAGNOSIS — I10 ESSENTIAL HYPERTENSION: ICD-10-CM

## 2024-11-04 RX ORDER — VALSARTAN AND HYDROCHLOROTHIAZIDE 320; 12.5 MG/1; MG/1
TABLET, FILM COATED ORAL
Qty: 90 TABLET | Refills: 1 | Status: SHIPPED | OUTPATIENT
Start: 2024-11-04

## 2024-11-12 ENCOUNTER — VBI (OUTPATIENT)
Dept: ADMINISTRATIVE | Facility: OTHER | Age: 54
End: 2024-11-12

## 2024-11-12 NOTE — TELEPHONE ENCOUNTER
11/12/24 9:44 AM     Chart reviewed for Blood Pressure ; nothing is submitted to the patient's insurance at this time.     Karen Albarado MA   [facility]

## 2024-11-22 ENCOUNTER — APPOINTMENT (OUTPATIENT)
Dept: LAB | Age: 54
End: 2024-11-22
Payer: COMMERCIAL

## 2024-11-22 ENCOUNTER — RESULTS FOLLOW-UP (OUTPATIENT)
Dept: ENDOCRINOLOGY | Facility: HOSPITAL | Age: 54
End: 2024-11-22

## 2024-11-22 DIAGNOSIS — E11.65 TYPE 2 DIABETES MELLITUS WITH HYPERGLYCEMIA, WITHOUT LONG-TERM CURRENT USE OF INSULIN (HCC): ICD-10-CM

## 2024-11-22 DIAGNOSIS — Z00.6 ENCOUNTER FOR EXAMINATION FOR NORMAL COMPARISON OR CONTROL IN CLINICAL RESEARCH PROGRAM: ICD-10-CM

## 2024-11-22 LAB
ALBUMIN SERPL BCG-MCNC: 4.3 G/DL (ref 3.5–5)
ALP SERPL-CCNC: 73 U/L (ref 34–104)
ALT SERPL W P-5'-P-CCNC: 33 U/L (ref 7–52)
ANION GAP SERPL CALCULATED.3IONS-SCNC: 11 MMOL/L (ref 4–13)
AST SERPL W P-5'-P-CCNC: 30 U/L (ref 13–39)
BILIRUB SERPL-MCNC: 0.78 MG/DL (ref 0.2–1)
BUN SERPL-MCNC: 22 MG/DL (ref 5–25)
CALCIUM SERPL-MCNC: 9 MG/DL (ref 8.4–10.2)
CHLORIDE SERPL-SCNC: 100 MMOL/L (ref 96–108)
CO2 SERPL-SCNC: 27 MMOL/L (ref 21–32)
CREAT SERPL-MCNC: 1.24 MG/DL (ref 0.6–1.3)
EST. AVERAGE GLUCOSE BLD GHB EST-MCNC: 160 MG/DL
GFR SERPL CREATININE-BSD FRML MDRD: 64 ML/MIN/1.73SQ M
GLUCOSE P FAST SERPL-MCNC: 186 MG/DL (ref 65–99)
HBA1C MFR BLD: 7.2 %
POTASSIUM SERPL-SCNC: 3.8 MMOL/L (ref 3.5–5.3)
PROT SERPL-MCNC: 7.5 G/DL (ref 6.4–8.4)
SODIUM SERPL-SCNC: 138 MMOL/L (ref 135–147)

## 2024-11-22 PROCEDURE — 36415 COLL VENOUS BLD VENIPUNCTURE: CPT

## 2024-11-22 PROCEDURE — 80053 COMPREHEN METABOLIC PANEL: CPT

## 2024-11-22 PROCEDURE — 83036 HEMOGLOBIN GLYCOSYLATED A1C: CPT

## 2024-11-26 DIAGNOSIS — I10 ESSENTIAL HYPERTENSION: ICD-10-CM

## 2024-11-26 DIAGNOSIS — E11.42 DIABETIC PERIPHERAL NEUROPATHY (HCC): ICD-10-CM

## 2024-11-26 DIAGNOSIS — E78.5 HYPERLIPIDEMIA, UNSPECIFIED HYPERLIPIDEMIA TYPE: ICD-10-CM

## 2024-11-27 RX ORDER — AMLODIPINE BESYLATE 5 MG/1
5 TABLET ORAL DAILY
Qty: 90 TABLET | Refills: 1 | Status: SHIPPED | OUTPATIENT
Start: 2024-11-27

## 2024-11-27 RX ORDER — GABAPENTIN 300 MG/1
300 CAPSULE ORAL 3 TIMES DAILY
Qty: 90 CAPSULE | Refills: 1 | Status: SHIPPED | OUTPATIENT
Start: 2024-11-27

## 2024-11-27 RX ORDER — SIMVASTATIN 40 MG
40 TABLET ORAL
Qty: 90 TABLET | Refills: 1 | Status: SHIPPED | OUTPATIENT
Start: 2024-11-27

## 2024-12-03 LAB
APOB+LDLR+PCSK9 GENE MUT ANL BLD/T: NOT DETECTED
BRCA1+BRCA2 DEL+DUP + FULL MUT ANL BLD/T: NOT DETECTED
MLH1+MSH2+MSH6+PMS2 GN DEL+DUP+FUL M: NOT DETECTED

## 2025-01-23 ENCOUNTER — RA CDI HCC (OUTPATIENT)
Dept: OTHER | Facility: HOSPITAL | Age: 55
End: 2025-01-23

## 2025-01-23 NOTE — PROGRESS NOTES
HCC coding opportunities          Chart Reviewed number of suggestions sent to Provider: 2   F33.9  E11.29    This is a reminder to address (resolve/update/assess) ALL HCC (risk adjustment) codes as found on active problem list for 2025 as patient scores reset to zero KIRTI  Patients Insurance        Commercial Insurance: [employer] Commercial [employer]

## 2025-01-28 ENCOUNTER — OFFICE VISIT (OUTPATIENT)
Dept: FAMILY MEDICINE CLINIC | Facility: CLINIC | Age: 55
End: 2025-01-28
Payer: COMMERCIAL

## 2025-01-28 VITALS
HEIGHT: 71 IN | TEMPERATURE: 96.8 F | OXYGEN SATURATION: 100 % | RESPIRATION RATE: 16 BRPM | BODY MASS INDEX: 33.91 KG/M2 | WEIGHT: 242.2 LBS | DIASTOLIC BLOOD PRESSURE: 92 MMHG | SYSTOLIC BLOOD PRESSURE: 140 MMHG | HEART RATE: 96 BPM

## 2025-01-28 DIAGNOSIS — F41.9 ANXIETY: ICD-10-CM

## 2025-01-28 DIAGNOSIS — E78.5 HYPERLIPIDEMIA, UNSPECIFIED HYPERLIPIDEMIA TYPE: ICD-10-CM

## 2025-01-28 DIAGNOSIS — E66.811 CLASS 1 OBESITY WITH SERIOUS COMORBIDITY AND BODY MASS INDEX (BMI) OF 34.0 TO 34.9 IN ADULT, UNSPECIFIED OBESITY TYPE: ICD-10-CM

## 2025-01-28 DIAGNOSIS — Z13.31 POSITIVE DEPRESSION SCREENING: ICD-10-CM

## 2025-01-28 DIAGNOSIS — E55.9 VITAMIN D DEFICIENCY: ICD-10-CM

## 2025-01-28 DIAGNOSIS — E11.65 TYPE 2 DIABETES MELLITUS WITH HYPERGLYCEMIA, WITHOUT LONG-TERM CURRENT USE OF INSULIN (HCC): ICD-10-CM

## 2025-01-28 DIAGNOSIS — I10 PRIMARY HYPERTENSION: ICD-10-CM

## 2025-01-28 DIAGNOSIS — Z00.00 ANNUAL PHYSICAL EXAM: Primary | ICD-10-CM

## 2025-01-28 DIAGNOSIS — F33.0 MILD RECURRENT MAJOR DEPRESSION (HCC): ICD-10-CM

## 2025-01-28 DIAGNOSIS — F33.9 DEPRESSION, RECURRENT (HCC): ICD-10-CM

## 2025-01-28 DIAGNOSIS — Z23 ENCOUNTER FOR IMMUNIZATION: ICD-10-CM

## 2025-01-28 PROCEDURE — 90673 RIV3 VACCINE NO PRESERV IM: CPT

## 2025-01-28 PROCEDURE — 99396 PREV VISIT EST AGE 40-64: CPT | Performed by: FAMILY MEDICINE

## 2025-01-28 PROCEDURE — 99214 OFFICE O/P EST MOD 30 MIN: CPT | Performed by: FAMILY MEDICINE

## 2025-01-28 PROCEDURE — 90471 IMMUNIZATION ADMIN: CPT

## 2025-01-28 RX ORDER — ALPRAZOLAM 0.25 MG/1
0.25 TABLET ORAL 3 TIMES DAILY PRN
Qty: 30 TABLET | Refills: 0 | Status: SHIPPED | OUTPATIENT
Start: 2025-01-28

## 2025-01-28 NOTE — PATIENT INSTRUCTIONS
"Patient Education     Depression in adults   The Basics   Written by the doctors and editors at [facility]   What is depression? -- Depression is a disorder that makes you sad, but it is different from normal sadness. Depression can make it hard for you to work, study, or do everyday tasks.  What causes depression? -- Depression is caused by problems with chemicals in the brain called \"neurotransmitters.\" Some people might be more likely to have depression if it runs in their family. Other things might also play a role, including hormones, certain health problems, medicines, stress, being mistreated as a child, family problems, and problems with friends or at school or work.  How do I know if I am depressed? -- People with depression feel down most of the time for at least 2 weeks. They also have at least 1 of these 2 symptoms:   They no longer enjoy or care about doing the things that they used to like to do.   They feel sad, down, hopeless, or cranky most of the day, almost every day.  People with depression can also have other symptoms. Examples include:   Changes in your appetite or weight. You might eat too little or too much, or gain or lose weight without trying.   Sleeping too much or too little   Feeling tired or like you have no energy   Feeling guilty, helpless, or like you are worth nothing   Trouble with concentration or memory   Acting restless or have trouble staying still, or moving or speaking more slowly than normal   Repeated thoughts of death or killing yourself  If you think that you might be depressed, see your doctor or nurse. Only someone trained in mental health can tell for sure if you are depressed.  How is depression diagnosed? -- Your doctor or nurse will do a physical exam, ask you questions, and might order tests. Depression can have a big impact on your life. Luckily, depression can be treated, and the sooner treatment is started, the better it works.  Get help right away if you are " "thinking of hurting or killing yourself! -- If you ever feel like you might hurt yourself or someone else, help is available:   In the [place], contact the 718 Suicide & Crisis Lifeline:   To speak to someone, call or text 590.   To talk to someone online, go to www.[employer].org/chat.   Call your doctor or nurse, and tell them it is urgent.   Call for an ambulance (in the [place] and [place], call 9-1-1).   Go to the emergency department at the nearest hospital.  What are the treatments for depression? -- Your doctor or nurse will work with you to make a treatment plan. Treatment can include:   Helping you learn more about depression   Counseling (with a psychiatrist, psychologist, nurse, or )   Medicines that relieve depression   Creating a plan to limit access to items that you might use to harm yourself   Other treatments that pass magnetic waves or electricity into the brain  In addition to treatment, getting regular physical activity can also help you feel better.  People with depression that is not too severe can get better by taking medicines or talking with a counselor. People with severe depression usually need medicines to get better, and might also need to see a counselor.  Another treatment involves placing a device against the scalp to pass magnetic waves into the brain. This is called \"transcranial magnetic stimulation\" (\"TMS\"). Doctors might suggest TMS if medicines and counseling have not helped.  Some people with severe depression might need a treatment called \"electroconvulsive therapy\" (\"ECT\"). During ECT, doctors pass an electric current through a person's brain in a safe way.  When will I feel better? -- Most treatment options take a little while to start working.   Many people who take medicines start to feel better within 2 weeks, but it might be 4 to 8 weeks before the medicine has its full effect.   Many people who see a counselor start to feel better within a few weeks, but it might " take 8 to 10 weeks to get the greatest benefit.  If the first treatment you try does not help you, tell your doctor or nurse, but do not give up. Some people need to try different treatments or combinations of treatments before they find an approach that works. Your doctor, nurse, or counselor can work with you to find the treatment that is right for you. They can also help you figure out how to cope while you search for the right treatment or are waiting for your treatment to start working.  How do I decide which treatment to have? -- You and your doctor or nurse will need to work together to choose a treatment for you. Medicines might work a little faster than counseling. But medicines can also cause side effects. Plus, some people do not like the idea of taking medicine.  Seeing a counselor involves talking about your feelings. That is also hard for some people.  What if I take medicine for depression and I want to have a baby? -- Some depression medicines can cause problems for an unborn baby. But having untreated depression during pregnancy can also cause problems. If you want to get pregnant, tell your doctor but do not stop taking your medicines. Together, you can plan the safest way for you to have your baby.  It's also important to talk with your doctor if you want to breastfeed after your baby is born. Breastfeeding has lots of benefits for both mother and baby. Some depression medicines are safer than others to use while breastfeeding. But having untreated depression after giving birth can also cause problems, so do not stop taking your medicines. Your doctor can work with you to plan the safest way for you to feed your baby.  All topics are updated as new evidence becomes available and our peer review process is complete.  This topic retrieved from [employer] on: Mar 06, 2024.  Topic 41049 Version 22.0  Release: 32.2.4 - C32.64  © 2024 UpToDate, Inc. and/or its affiliates. All rights reserved.  figure 1:  "Mood disorders caused by problems in the brain     Mooddisorders, such as depression and bipolar disorder, are caused by problems with\"neurotransmitters.\" These are chemicals in the brain that can affect your emotions.Treatments for mood disorders seem to work by changing the levels of certainneurotransmitters.  Graphic 18585 Version 4.0  Consumer Information Use and Disclaimer   Disclaimer: This generalized information is a limited summary of diagnosis, treatment, and/or medication information. It is not meant to be comprehensive and should be used as a tool to help the user understand and/or assess potential diagnostic and treatment options. It does NOT include all information about conditions, treatments, medications, side effects, or risks that may apply to a specific patient. It is not intended to be medical advice or a substitute for the medical advice, diagnosis, or treatment of a health care provider based on the health care provider's examination and assessment of a patient's specific and unique circumstances. Patients must speak with a health care provider for complete information about their health, medical questions, and treatment options, including any risks or benefits regarding use of medications. This information does not endorse any treatments or medications as safe, effective, or approved for treating a specific patient. UpToDate, Inc. and its affiliates disclaim any warranty or liability relating to this information or the use thereof.The use of this information is governed by the Terms of Use, available at https://www.[facility].com/en/know/clinical-effectiveness-terms. 2024© UpToDate, Inc. and its affiliates and/or licensors. All rights reserved.  Copyright   © 2024 UpToDate, Inc. and/or its affiliates. All rights reserved.    "

## 2025-01-28 NOTE — PROGRESS NOTES
Assessment/Plan:   Patient received Flublok vaccine today.  Patient to continue present treatment and will continue to work with endocrinology regarding medication options.  Patient to schedule follow-up appoint with [facility] endocrinology.  Instructed to follow a low-fat, low-salt and a low sugar/carbohydrate diet more carefully and get regular aerobic exercise walking up to 150 minutes/week.  Weight loss encouraged.  Return to the office in 6 months.   Diagnoses and all orders for this visit:    Annual physical exam    Type 2 diabetes mellitus with hyperglycemia, without long-term current use of insulin ([facility])    Primary hypertension    Hyperlipidemia, unspecified hyperlipidemia type    Anxiety  Comments:  Alprazolam p.r.n..  Referral to psychologist for counseling.  Orders:  -     ALPRAZolam (XANAX) 0.25 mg tablet; Take 1 tablet (0.25 mg total) by mouth 3 (three) times a day as needed for anxiety    Depression, recurrent ([facility])    Class 1 obesity with serious comorbidity and body mass index (BMI) of 34.0 to 34.9 in adult, unspecified obesity type    Vitamin D deficiency    Encounter for immunization  -     influenza vaccine, recombinant, PF, 0.5 mL IM (Flublok)          Subjective:     Patient ID: Zac Alvarenga is a 54 y.o. male.    Patient is here for annual exam and follow-up of chronic conditions.  Patient has been feeling fairly well overall.  No regular exercise program.  Patient is planning on moving to [place] in the next few months.  Patient [facility] with [facility]ology although he missed his last appointment and encouraged to reschedule.  Patient has been having problems with his medical insurance prescription plan not covering Jardiance or Janumet.  He is working on getting coverage and endocrinology has tried alternative meds.  Patient is up-to-date on diabetic eye exam and foot exam.  Patient had colonoscopy in 2019 and was recommended to repeat in 5 years.  Patient cannot tolerate CPAP  secondary to panic attacks.    Diabetes  He presents for his follow-up diabetic visit. He has type 2 diabetes mellitus. His disease course has been stable. Hypoglycemia symptoms include nervousness/anxiousness. Pertinent negatives for hypoglycemia include no confusion. Associated symptoms include fatigue and foot paresthesias. Pertinent negatives for diabetes include no blurred vision, no chest pain, no foot ulcerations, no polydipsia, no polyuria, no visual change, no weakness and no weight loss. There are no hypoglycemic complications. Symptoms are stable. Diabetic complications include nephropathy and peripheral neuropathy. Pertinent negatives for diabetic complications include no CVA, heart disease or retinopathy. Risk factors for coronary artery disease include family history, dyslipidemia, diabetes mellitus, hypertension, male sex and obesity. Current diabetic treatment includes oral agent (triple therapy). He is compliant with treatment all of the time. His weight is increasing steadily. He is following a generally healthy diet. He participates in exercise intermittently. His home blood glucose trend is increasing steadily. His breakfast blood glucose is taken between 7-8 am. His breakfast blood glucose range is generally 180-200 mg/dl. His lunch blood glucose is taken between 11-12 pm. His lunch blood glucose range is generally 140-180 mg/dl. His dinner blood glucose is taken between 5-6 pm. His dinner blood glucose range is generally  mg/dl. His bedtime blood glucose is taken between 8-9 pm. His bedtime blood glucose range is generally 140-180 mg/dl. An ACE inhibitor/angiotensin II receptor blocker is being taken. He sees a podiatrist.Eye exam is current.   Anxiety  Presents for follow-up visit. Symptoms include depressed mood, excessive worry, nervous/anxious behavior, panic and restlessness. Patient reports no chest pain, confusion, decreased concentration, hyperventilation, insomnia, irritability,  palpitations, shortness of breath or suicidal ideas. Symptoms occur occasionally. The patient sleeps 7 hours per night. The quality of sleep is fair. Nighttime awakenings: one to two.           Review of Systems   Constitutional:  Positive for fatigue. Negative for irritability and weight loss.   Eyes:  Negative for blurred vision.   Respiratory:  Negative for shortness of breath.    Cardiovascular:  Negative for chest pain and palpitations.   Endocrine: Negative for polydipsia and polyuria.   Neurological:  Negative for weakness.   Psychiatric/Behavioral:  Negative for confusion, decreased concentration and suicidal ideas. The patient is nervous/anxious. The patient does not have insomnia.          Objective:     Physical Exam  Constitutional:       General: He is not in acute distress.     Appearance: Normal appearance. He is obese.   HENT:      Head: Normocephalic.      Mouth/Throat:      Mouth: Mucous membranes are moist.   Eyes:      General: No scleral icterus.     Conjunctiva/sclera: Conjunctivae normal.   Neck:      Vascular: No carotid bruit.   Cardiovascular:      Rate and Rhythm: Normal rate and regular rhythm.   Pulmonary:      Effort: Pulmonary effort is normal.      Breath sounds: Normal breath sounds.   Abdominal:      Palpations: Abdomen is soft.      Tenderness: There is no abdominal tenderness.   Musculoskeletal:      Cervical back: Neck supple.      Right lower leg: No edema.      Left lower leg: No edema.   Lymphadenopathy:      Cervical: No cervical adenopathy.   Skin:     General: Skin is warm and dry.   Neurological:      General: No focal deficit present.      Mental Status: He is alert and oriented to person, place, and time.   Psychiatric:         Mood and Affect: Mood normal.         Behavior: Behavior normal.         Thought Content: Thought content normal.         Judgment: Judgment normal.         Depression Screening Follow-up Plan: Patient's depression screening was positive with a  PHQ-9 score of 5. Patient with underlying depression and was advised to continue current medications as prescribed. Patient assessed for underlying major depression. They have no active suicidal ideations. Brief counseling provided and recommend additional follow-up/re-evaluation next office visit.

## 2025-01-29 ENCOUNTER — TELEPHONE (OUTPATIENT)
Dept: ADMINISTRATIVE | Facility: OTHER | Age: 55
End: 2025-01-29

## 2025-01-29 NOTE — LETTER
Diabetic Eye Exam Form    Date Requested: 25  Patient: Zac Alvarenga  Patient : 1970   Referring Provider: Scot Temple DO      DIABETIC Eye Exam Date _______________________________      Type of Exam MUST be documented for Diabetic Eye Exams. Please CHECK ONE.     Retinal Exam       Dilated Retinal Exam       OCT       Optomap-Iris Exam      Fundus Photography       Left Eye - Please check Retinopathy or No Retinopathy        Exam did show retinopathy    Exam did not show retinopathy       Right Eye - Please check Retinopathy or No Retinopathy       Exam did show retinopathy    Exam did not show retinopathy       Comments __________________________________________________________    Practice Providing Exam ______________________________________________    Exam Performed By (print name) _______________________________________      Provider Signature ___________________________________________________      These reports are needed for  compliance.  Please fax this completed form and a copy of the Diabetic Eye Exam report to our office located at [address] [place] [address] as soon as possible via Fax [phone] attention Terri: Phone [phone]  We thank you for your assistance in treating our mutual patient.

## 2025-01-29 NOTE — LETTER
Diabetic Eye Exam Form    Date Requested: 25  Patient: Zac Alvarenga  Patient : 1970   Referring Provider: Scot Temple DO      DIABETIC Eye Exam Date _______________________________      Type of Exam MUST be documented for Diabetic Eye Exams. Please CHECK ONE.     Retinal Exam       Dilated Retinal Exam       OCT       Optomap-Iris Exam      Fundus Photography       Left Eye - Please check Retinopathy or No Retinopathy        Exam did show retinopathy    Exam did not show retinopathy       Right Eye - Please check Retinopathy or No Retinopathy       Exam did show retinopathy    Exam did not show retinopathy       Comments __________________________________________________________    Practice Providing Exam ______________________________________________    Exam Performed By (print name) _______________________________________      Provider Signature ___________________________________________________      These reports are needed for  compliance.  Please fax this completed form and a copy of the Diabetic Eye Exam report to our office located at [address] [place] [address] as soon as possible via Fax [phone] attention Terri: Phone [phone]  We thank you for your assistance in treating our mutual patient.

## 2025-01-29 NOTE — TELEPHONE ENCOUNTER
----- Message from Andra LÓPEZ sent at 1/28/2025  8:07 AM EST -----  Patient had DM Eye completed last year with Dr. Dominguez @ [facility] [address]

## 2025-02-05 ENCOUNTER — OFFICE VISIT (OUTPATIENT)
Dept: ENDOCRINOLOGY | Facility: HOSPITAL | Age: 55
End: 2025-02-05
Payer: COMMERCIAL

## 2025-02-05 VITALS
DIASTOLIC BLOOD PRESSURE: 92 MMHG | OXYGEN SATURATION: 97 % | HEART RATE: 97 BPM | HEIGHT: 71 IN | SYSTOLIC BLOOD PRESSURE: 154 MMHG | BODY MASS INDEX: 33.74 KG/M2 | WEIGHT: 241 LBS

## 2025-02-05 DIAGNOSIS — E11.42 DIABETIC PERIPHERAL NEUROPATHY (HCC): ICD-10-CM

## 2025-02-05 DIAGNOSIS — E11.65 TYPE 2 DIABETES MELLITUS WITH HYPERGLYCEMIA, WITHOUT LONG-TERM CURRENT USE OF INSULIN (HCC): Primary | ICD-10-CM

## 2025-02-05 DIAGNOSIS — R80.9 MICROALBUMINURIA DUE TO TYPE 2 DIABETES MELLITUS  (HCC): ICD-10-CM

## 2025-02-05 DIAGNOSIS — I10 PRIMARY HYPERTENSION: ICD-10-CM

## 2025-02-05 DIAGNOSIS — E78.5 HYPERLIPIDEMIA, UNSPECIFIED HYPERLIPIDEMIA TYPE: ICD-10-CM

## 2025-02-05 DIAGNOSIS — E11.29 MICROALBUMINURIA DUE TO TYPE 2 DIABETES MELLITUS  (HCC): ICD-10-CM

## 2025-02-05 PROCEDURE — 99214 OFFICE O/P EST MOD 30 MIN: CPT | Performed by: INTERNAL MEDICINE

## 2025-02-05 PROCEDURE — 95251 CONT GLUC MNTR ANALYSIS I&R: CPT | Performed by: INTERNAL MEDICINE

## 2025-02-05 RX ORDER — BLOOD-GLUCOSE SENSOR
1 EACH MISCELLANEOUS CONTINUOUS
Qty: 6 EACH | Refills: 3 | Status: SHIPPED | OUTPATIENT
Start: 2025-02-05

## 2025-02-05 NOTE — PROGRESS NOTES
2/5/2025    Assessment & Plan      Diagnoses and all orders for this visit:    Type 2 diabetes mellitus with hyperglycemia, without long-term current use of insulin (HCC)  -     semaglutide, 0.25 or 0.5 mg/dose, (Ozempic, 0.25 or 0.5 MG/DOSE,) 2 mg/3 mL injection pen; Inject 0.75 mL (0.5 mg total) under the skin every 7 days  -     Continuous Glucose Sensor (FreeStyle Nazanin 2 Plus Sensor) MISC; Use 1 each continuous Apply every 15 days  to test sugars 4-10 times a day.  -     Comprehensive metabolic panel; Future  -     Hemoglobin A1C; Future  -     TSH, 3rd generation; Future  -     Lipid Panel with Direct LDL reflex; Future  -     Albumin / creatinine urine ratio; Future  -     CBC and differential; Future    Diabetic peripheral neuropathy (HCC)  -     Comprehensive metabolic panel; Future  -     Hemoglobin A1C; Future  -     TSH, 3rd generation; Future  -     Lipid Panel with Direct LDL reflex; Future  -     Albumin / creatinine urine ratio; Future  -     CBC and differential; Future    Primary hypertension  -     Comprehensive metabolic panel; Future  -     Hemoglobin A1C; Future  -     TSH, 3rd generation; Future  -     Lipid Panel with Direct LDL reflex; Future  -     Albumin / creatinine urine ratio; Future  -     CBC and differential; Future    Hyperlipidemia, unspecified hyperlipidemia type  -     Comprehensive metabolic panel; Future  -     Hemoglobin A1C; Future  -     TSH, 3rd generation; Future  -     Lipid Panel with Direct LDL reflex; Future  -     Albumin / creatinine urine ratio; Future  -     CBC and differential; Future    Microalbuminuria due to type 2 diabetes mellitus  (HCC)  -     Comprehensive metabolic panel; Future  -     Hemoglobin A1C; Future  -     TSH, 3rd generation; Future  -     Lipid Panel with Direct LDL reflex; Future  -     Albumin / creatinine urine ratio; Future  -     CBC and differential; Future          Assessment & Plan  1. Type 2 Diabetes Mellitus.  His hemoglobin A1c  level has escalated to 7.2%, indicating a need for intervention. He will persist with Farxiga 5 mg daily and metformin 1000 mg twice daily. The initiation of Ozempic 0.25 mg once weekly for 2 weeks, followed by an increase to 0.5 mg once weekly thereafter, is recommended. He is advised to monitor his blood glucose levels closely upon starting Ozempic and inform us if they begin to decrease which point we will adjust his glimepiride if needed. The use of the Nazanin 2+ will continue, with a prescription refill provided. Blood work has been ordered for the next visit. If his blood glucose levels decrease, the glimepiride dosage can be reduced.    2.  Diabetic neuropathy.  He reports numbness and tingling in his feet, particularly at night. He is currently using gabapentin 300 mg three times a day for neuropathy. He has also been using a magnesium-containing cream, which he finds helpful.    3. Hypertension.  He is currently taking amlodipine 5 mg daily and valsartan HCTZ 320/12.5 mg daily for blood pressure management. He should continue these medications as prescribed.    4. Hyperlipidemia.  He is currently taking simvastatin 40 mg daily for cholesterol management. He should continue this medication as prescribed.    5.  Microalbuminuria.  He will continue the same valsartan 320 mg daily.  A urine microalbumin to creatinine ratio will be performed with his next visit.    6. Weight management.  Ozempic was discussed in detail including the risks and benefits.    Follow-up  The patient will follow up in 3 to 4 months with preceding hemoglobin A1c, CMP, TSH, CBC, lipid panel, and urine microalbumin to creatinine ratio.        CC: Diabetes type II, blood pressure, lipid follow-up    History of Present Illness    HPI: Zac Alvarenga is a 54-year-old male with a history of type 2 diabetes with neuropathy diagnosed 16 years ago, hypertension, and hyperlipidemia, here for a follow-up visit.     Diabetes complications include  neuropathy and microalbuminuria, as he reports no retinopathy, heart attack, stroke, or claudication.    He has been experiencing difficulty in losing weight and is considering the use of Ozempic, a medication his daughter has successfully used for weight loss. He consulted his primary care physician, Dr. Temple, about this potential treatment, who advised him to discuss it with an endocrinologist.     He has been managing his diabetes with Farxiga 5 mg daily, glimepiride 2 mg twice daily, and metformin 1000 mg twice daily. He discontinued the use of Tradjenta and Jardiance due to insurance coverage issues. His A1c levels have fluctuated, decreasing to 6.1 but recently increasing to 7.3. He has been experiencing issues with his Nazanin sensor and has been informed by [employer] that they will no longer support the Nazanin 2. He has one sensor remaining from his 3-month supply and is seeking a replacement.     He reports no recent episodes of blurry vision and had his last ophthalmology appointment in 2024.    He reports frequent urination, which is managed with nightly doses of tamsulosin 10 mg. Without this medication, he experiences up to six nighttime urinations. He also reports dry mouth, which he attributes to inadequate fluid intake. He typically consumes a bottle of water and a cup of coffee in the morning but does not drink excessively throughout the day. He experiences abdominal discomfort after drinking water.    He has neuropathy affecting the dorsum of his feet, which is more bothersome at night, causing numbness and tingling. He has found relief with Mama Bear cream, which contains magnesium.  Last diabetic foot exam was May 2024.    He is currently on amlodipine 5 mg daily and valsartan HCTZ 320/12.5 mg daily for hypertension management. He reports no chest pain or shortness of breath.    He is on simvastatin 40 mg daily for hyperlipidemia management.    Blood Sugar/Glucometer/Pump/CGM review: He  utilizes a freestyle mandy 2 continuous glucose monitoring system to test his blood sugars throughout the day.  Freestyle mandy download from 1/23/2025 through 2/5/2025 was reviewed in the office today.  CGM was active 53% of the time.  Average glucose is 191 mg/dL with a glucose variability of 23.7% and a GMI of 7.9%.  40% of blood sugars are in target range, 51% high, 9% very high, 0% low, and 0% very low.      Historical Information   Past Medical History:   Diagnosis Date    Anxiety     COVID-19 10/28/2022    Diabetes mellitus (HCC)     Hyperlipidemia     Hypertension     Kidney stone     Psychiatric disorder     Anxiety     Past Surgical History:   Procedure Laterality Date    EXTRACORPOREAL SHOCK WAVE LITHOTRIPSY      KNEE ARTHROSCOPY Left     torn meniscus    MD CYSTO/URETERO W/LITHOTRIPSY &INDWELL STENT INSRT Left 5/23/2017    Procedure: CYSTOSCOPY; URETEROSCOPY; HOLMIUM LASER; RETROGRADE PYELOGRAM; STENT PLACEMENT ;  Surgeon: Leif Pablo MD;  Location: [facility];  Service: Urology    MD LITHOTRIPSY XTRCORP SHOCK WAVE Left 5/18/2017    Procedure: ESWL ;  Surgeon: Abdoulaye Roberts MD;  Location: [facility];  Service: Urology     Social History   Social History     Substance and Sexual Activity   Alcohol Use Yes    Alcohol/week: 5.0 standard drinks of alcohol    Types: 5 Standard drinks or equivalent per week    Comment: SOCIAL- 3-4 drinks a week with a meal     Social History     Substance and Sexual Activity   Drug Use No     Social History     Tobacco Use   Smoking Status Never   Smokeless Tobacco Never     Family History:   Family History   Problem Relation Age of Onset    Cancer Maternal Grandmother     Breast cancer Maternal Grandmother     Diabetes unspecified Mother         prediabetes    Thyroid disease unspecified Mother     Diabetes Father     Hypertension Father     Hyperlipidemia Father     Diabetes type II Father         toe amputation    No Known Problems Sister     Alcohol abuse  Brother     Polycystic ovary syndrome Daughter         possibly, on metformin after weight gain    No Known Problems Daughter        Meds/Allergies   Current Outpatient Medications   Medication Sig Dispense Refill    ALPRAZolam (XANAX) 0.25 mg tablet Take 1 tablet (0.25 mg total) by mouth 3 (three) times a day as needed for anxiety 30 tablet 0    amLODIPine (NORVASC) 5 mg tablet TAKE 1 TABLET (5 MG TOTAL) BY MOUTH IN THE MORNING 90 tablet 1    aspirin 81 MG tablet Take 81 mg by mouth daily      Blood Glucose Monitoring Suppl (ACCU-CHEK JESSY CONNECT) w/Device KIT by Does not apply route      Cholecalciferol (VITAMIN D3) 2000 UNITS TABS Take 1 tablet by mouth daily      Continuous Glucose Sensor (FreeStyle Nazanin 2 Plus Sensor) MISC Use 1 each continuous Apply every 15 days  to test sugars 4-10 times a day. 6 each 3    dapagliflozin 5 MG TABS Take 1 tablet (5 mg total) by mouth daily 90 tablet 3    gabapentin (NEURONTIN) 300 mg capsule TAKE 1 CAPSULE BY MOUTH THREE TIMES A DAY 90 capsule 1    glimepiride (AMARYL) 2 mg tablet TAKE 1 TABLET BY MOUTH 2 TIMES A DAY. 180 tablet 1    metFORMIN (GLUCOPHAGE) 1000 MG tablet Take 1 tablet (1,000 mg total) by mouth 2 (two) times a day with meals 180 tablet 3    Omega-3 Fatty Acids (FISH OIL) 1,000 mg Take 1 capsule by mouth daily      semaglutide, 0.25 or 0.5 mg/dose, (Ozempic, 0.25 or 0.5 MG/DOSE,) 2 mg/3 mL injection pen Inject 0.75 mL (0.5 mg total) under the skin every 7 days 3 mL 6    sertraline (ZOLOFT) 100 mg tablet TAKE 2 TABLETS BY MOUTH DAILY 180 tablet 1    sildenafil (REVATIO) 20 mg tablet TAKE 3 TABLETS BY MOUTH DAILY AS NEEDED FOR ERECTILE DYSFUNCTION. 90 tablet 0    simvastatin (ZOCOR) 40 mg tablet TAKE 1 TABLET BY MOUTH EVERYDAY AT BEDTIME 90 tablet 1    tamsulosin (FLOMAX) 0.4 mg Take 1 capsule (0.4 mg total) by mouth daily with dinner 90 capsule 3    valsartan-hydrochlorothiazide (DIOVAN-HCT) 320-12.5 MG per tablet TAKE 1 TABLET BY MOUTH EVERY DAY 90 tablet 1  "   methocarbamol (ROBAXIN) 500 mg tablet Take 1 tablet (500 mg total) by mouth 3 (three) times a day (Patient not taking: Reported on 5/16/2024) 20 tablet 0     No current facility-administered medications for this visit.     No Known Allergies    Objective   Vitals: Blood pressure 154/92, pulse 97, height 5' 11\" (1.803 m), weight 109 kg (241 lb), SpO2 97%.  Invasive Devices       None                   Physical Exam    Neck: Thyroid normal in size.  No palpable thyroid nodules.  Lungs: Clear to auscultation.  Coronary: Regular rate and rhythm.  No murmurs.  Extremities: No lower extremity edema.      The history was obtained from the review of the chart and from the patient.    Lab Results:    Most recent Alc is  Lab Results   Component Value Date    HGBA1C 7.2 (H) 11/22/2024           Lab work performed on 11/22/2024 showed a CMP with a glucose of 186 fasting but was otherwise normal.    Lab Results   Component Value Date    CREATININE 1.24 11/22/2024    CREATININE 1.42 (H) 08/09/2024    CREATININE 1.16 05/08/2024    BUN 22 11/22/2024     09/02/2016    K 3.8 11/22/2024     11/22/2024    CO2 27 11/22/2024     eGFR   Date Value Ref Range Status   11/22/2024 64 ml/min/1.73sq m Final         Lab Results   Component Value Date    CHOL 155 09/02/2016    HDL 34 (L) 05/08/2024    TRIG 210 (H) 05/08/2024    CHOLHDL 6.2 (H) 05/20/2019       Lab Results   Component Value Date    ALT 33 11/22/2024    AST 30 11/22/2024    ALKPHOS 73 11/22/2024    BILITOT 1.3 (H) 09/02/2016                 Future Appointments   Date Time Provider Department Center   6/10/2025  2:50 PM Magan Gross PA-C [facility]   7/29/2025  4:40 PM DO MILTON Chery [facility]     "

## 2025-02-05 NOTE — PATIENT INSTRUCTIONS
Hgba1c is 7.2%. this is higher.     We can continue farxiga 5 mg daily and metformin 1000 mg twice a day.     We can stat ozempic 0.25 mg once a week injected for 2 weeks and then 0.5 mg once a week thereafter.     Continue to use the mandy, we'll switch to the mandy 2+.continue the glimepiride until the sugars start to decrease more, then let us know and we can decrease the glimepiride.     Follow up in 3-4 months with blood work.

## 2025-02-06 ENCOUNTER — TELEPHONE (OUTPATIENT)
Dept: ENDOCRINOLOGY | Facility: HOSPITAL | Age: 55
End: 2025-02-06

## 2025-02-06 NOTE — TELEPHONE ENCOUNTER
Received notice prior authorization is needed for Freestyle Nazanin 2 Plus Sensor. Please obtain.  KEY: [id number]

## 2025-02-11 NOTE — TELEPHONE ENCOUNTER
PA for (FreeStyle Nazanin 2 Plus Sensor)SUBMITTED to [facility]     via      [x][employer]-Case ID #       [x]PA sent as URGENT    All office notes, labs and other pertaining documents and studies sent. Clinical questions answered. Awaiting determination from insurance company.     Turnaround time for your insurance to make a decision on your Prior Authorization can take 7-21 business days.

## 2025-02-12 NOTE — TELEPHONE ENCOUNTER
PA for (FreeStyle Nazanin 2 Plus Sensor)  APPROVED     Date(s) approved February 11, 2025 to February 11, 2026         Patient advised by          [][employer] Message  [x]Phone call   []LMOM  []L/M to call office as no active Communication consent on file  []Unable to leave detailed message as VM not approved on Communication consent       Pharmacy advised by    [x]Fax  []Phone call    Approval letter scanned into Media Yes

## 2025-02-17 ENCOUNTER — TELEMEDICINE (OUTPATIENT)
Dept: OTHER | Facility: HOSPITAL | Age: 55
End: 2025-02-17
Payer: COMMERCIAL

## 2025-02-17 VITALS — TEMPERATURE: 98.7 F

## 2025-02-17 DIAGNOSIS — U07.1 COVID: Primary | ICD-10-CM

## 2025-02-17 PROCEDURE — 99213 OFFICE O/P EST LOW 20 MIN: CPT | Performed by: NURSE PRACTITIONER

## 2025-02-17 RX ORDER — BENZONATATE 200 MG/1
200 CAPSULE ORAL 3 TIMES DAILY PRN
Qty: 15 CAPSULE | Refills: 0 | Status: SHIPPED | OUTPATIENT
Start: 2025-02-17 | End: 2025-02-22

## 2025-02-17 NOTE — PROGRESS NOTES
Virtual Regular Visit  Name: Zac Alvarenga      : 1970      MRN: [id number]  Encounter Provider: DUSTIN Hamilton  Encounter Date: 2025   Encounter department: VIRTUAL CARE       Verification of patient location:  Patient is located at Home in the following state in which I hold an active license [place] :  Assessment & Plan  COVID    Orders:    benzonatate (TESSALON) 200 MG capsule; Take 1 capsule (200 mg total) by mouth 3 (three) times a day as needed for cough for up to 5 days        Encounter provider DUSTIN Hamilton    The patient was identified by name and date of birth. Zac Alvarenga was informed that this is a telemedicine visit and that the visit is being conducted through the Epic Embedded platform. He agrees to proceed..  My office door was closed. No one else was in the room.  He acknowledged consent and understanding of privacy and security of the video platform. The patient has agreed to participate and understands they can discontinue the visit at any time.    Patient is aware this is a billable service.     History obtained from: patient  History of Present Illness     This is a 54 year old male here today for video visit.  He states he woke up today for cough and congestion.  He states the cough is hacking.  No chest pain or sob.  He states he is having some slight sore throat yesterday but that went away.  He states he is eating and drinking okay.  He did start ozempic on Saturday.  He states he is not having chest pain or sob. He has been taking coriciden.  He did the flu/covid which was positive.  He states he didh ave flu and covid vaccine.       Review of Systems   Constitutional:  Positive for activity change, chills and fatigue.   HENT:  Positive for congestion and rhinorrhea.    Respiratory:  Positive for cough.    Gastrointestinal: Negative.    Neurological: Negative.    Psychiatric/Behavioral: Negative.         Objective   Temp 98.7 °F (37.1 °C)     Physical  Exam  Constitutional:       General: He is not in acute distress.     Appearance: Normal appearance. He is not ill-appearing or toxic-appearing.   HENT:      Head: Normocephalic and atraumatic.   Pulmonary:      Effort: Pulmonary effort is normal. No respiratory distress.      Comments: Slight cough  Neurological:      Mental Status: He is alert and oriented to person, place, and time.   Psychiatric:         Mood and Affect: Mood normal.         Behavior: Behavior normal.         Thought Content: Thought content normal.         Judgment: Judgment normal.         Visit Time  Total Visit Duration: 6 minutes not including the time spent for establishing the audio/video connection.

## 2025-02-18 NOTE — PATIENT INSTRUCTIONS
"Rest and drink extra fluids. Paxlovid may interfere with several of your medications, will hold off.  You can discuss with your PCP. OTC cough and cold as needed. Tylenol or motrin as needed.  Stay home until fever free for 24 hours and symptoms are improving.  Go to ER with any chest pain, sob or difficulty breathing.     Patient Education     COVID-19 in adults - Discharge instructions   The Basics   Written by the doctors and editors at [facility]   What are discharge instructions? -- Discharge instructions are information about how to take care of yourself after getting medical care for a health problem.  What is COVID-19? -- COVID-19 stands for \"coronavirus disease 2019.\" It is caused by a virus called SARS-CoV-2.  The virus that causes COVID-19 mainly spreads from person to person. This usually happens when an infected person coughs, sneezes, or talks near other people. A person can be infected, and spread the virus to others, even without having any symptoms.  How do I care for myself at home? -- Ask the doctor or nurse what you should do when you go home. Make sure that you understand exactly what you need to do to care for yourself. Ask questions if there is anything you do not understand.  You can also:   Follow all instructions for taking your medicines, if your doctor prescribed any.   Drink plenty of fluids, such as water, juice, or broth. This helps replace fluids lost from a fever.   Take acetaminophen (sample brand name: Tylenol) to help reduce a fever. If this does not help, you can try medicines like ibuprofen (sample brand names: Advil, Motrin).   Use a cool mist humidifier. This might make it easier to breathe.   Lower the chance of passing the infection to others:   Stay home while you are feeling sick or have a fever.   At home, try to limit close contact with other people. You can also help protect others by wearing a face mask.   Wash your hands often (figure 1).   Cover your mouth and nose " with the inside of your elbow when you cough or sneeze.   Do not go to work or school until your symptoms are improving and your fever has been gone for at least 24 hours without taking medicine such as acetaminophen.  If you have not already been vaccinated, consider getting a COVID-19 vaccine as soon as you have recovered. Being vaccinated is the best way to protect yourself and others.  When should I call the doctor? -- Call for an ambulance (in the [place] and [place], call 9-1-1) if:   You are having so much trouble breathing that you cannot speak a full sentence.   You are very confused or cannot stay awake.   Your lips or skin start to turn blue.   You think that you might be having a medical emergency - Examples include severe chest pain, feeling extremely weak or like you might pass out, or losing control of your body (like being unable to speak normally or move your arm or leg).  Call your doctor if:   You develop new shortness of breath, or your breathing gets worse (but you can still talk in full sentences).   You become weak or dizzy.   You have very dark urine or do not urinate for more than 8 hours.   You have new or worsening symptoms that concern you - COVID-19 symptoms can include fever, cough, feeling very tired, shaking, chills, headache, and trouble swallowing. They can also include digestive problems like vomiting or diarrhea.  All topics are updated as new evidence becomes available and our peer review process is complete.  This topic retrieved from [employer] on: Mar 13, 2024.  Topic 552353 Version 2.0  Release: 32.2.4 - C32.71  © 2024 UpToDate, Inc. and/or its affiliates. All rights reserved.  figure 1: How to wash your hands     Wet your hands with clean water, and apply a small amount of soap. Lather and rub hands together for at least 20 seconds. Clean your wrists, palms, backs of your hands, between your fingers, tips of your fingers, thumbs, and under and around your nails. Rinse well, and dry  your hands using a clean towel.  Graphic 073369 Version 7.0  Consumer Information Use and Disclaimer   Disclaimer: This generalized information is a limited summary of diagnosis, treatment, and/or medication information. It is not meant to be comprehensive and should be used as a tool to help the user understand and/or assess potential diagnostic and treatment options. It does NOT include all information about conditions, treatments, medications, side effects, or risks that may apply to a specific patient. It is not intended to be medical advice or a substitute for the medical advice, diagnosis, or treatment of a health care provider based on the health care provider's examination and assessment of a patient's specific and unique circumstances. Patients must speak with a health care provider for complete information about their health, medical questions, and treatment options, including any risks or benefits regarding use of medications. This information does not endorse any treatments or medications as safe, effective, or approved for treating a specific patient. UpToDate, Inc. and its affiliates disclaim any warranty or liability relating to this information or the use thereof.The use of this information is governed by the Terms of Use, available at https://www.woltersPicnicHealthuwer.com/en/know/clinical-effectiveness-terms. 2024© UpToDate, Inc. and its affiliates and/or licensors. All rights reserved.  Copyright   © 2024 UpToDate, Inc. and/or its affiliates. All rights reserved.

## 2025-02-19 ENCOUNTER — TELEPHONE (OUTPATIENT)
Age: 55
End: 2025-02-19

## 2025-02-19 DIAGNOSIS — U07.1 COVID-19 VIRUS INFECTION: Primary | ICD-10-CM

## 2025-02-19 RX ORDER — NIRMATRELVIR AND RITONAVIR 300-100 MG
3 KIT ORAL 2 TIMES DAILY
Qty: 30 TABLET | Refills: 0 | Status: SHIPPED | OUTPATIENT
Start: 2025-02-19 | End: 2025-02-24

## 2025-02-19 NOTE — TELEPHONE ENCOUNTER
Patient had a virtual on Monday not feeling any better wondered if he could have paxlovid ordered for him.

## 2025-05-06 ENCOUNTER — APPOINTMENT (OUTPATIENT)
Dept: LAB | Age: 55
End: 2025-05-06
Attending: INTERNAL MEDICINE
Payer: COMMERCIAL

## 2025-05-06 DIAGNOSIS — E78.5 HYPERLIPIDEMIA, UNSPECIFIED HYPERLIPIDEMIA TYPE: ICD-10-CM

## 2025-05-06 DIAGNOSIS — F41.9 ANXIETY: ICD-10-CM

## 2025-05-06 DIAGNOSIS — E11.65 TYPE 2 DIABETES MELLITUS WITH HYPERGLYCEMIA, WITHOUT LONG-TERM CURRENT USE OF INSULIN (HCC): ICD-10-CM

## 2025-05-06 DIAGNOSIS — R80.9 MICROALBUMINURIA DUE TO TYPE 2 DIABETES MELLITUS  (HCC): ICD-10-CM

## 2025-05-06 DIAGNOSIS — I10 PRIMARY HYPERTENSION: ICD-10-CM

## 2025-05-06 DIAGNOSIS — E11.29 MICROALBUMINURIA DUE TO TYPE 2 DIABETES MELLITUS  (HCC): ICD-10-CM

## 2025-05-06 DIAGNOSIS — E11.42 DIABETIC PERIPHERAL NEUROPATHY (HCC): ICD-10-CM

## 2025-05-06 LAB
ALBUMIN SERPL BCG-MCNC: 4.2 G/DL (ref 3.5–5)
ALP SERPL-CCNC: 66 U/L (ref 34–104)
ALT SERPL W P-5'-P-CCNC: 31 U/L (ref 7–52)
ANION GAP SERPL CALCULATED.3IONS-SCNC: 11 MMOL/L (ref 4–13)
AST SERPL W P-5'-P-CCNC: 27 U/L (ref 13–39)
BASOPHILS # BLD AUTO: 0.06 THOUSANDS/ÂΜL (ref 0–0.1)
BASOPHILS NFR BLD AUTO: 1 % (ref 0–1)
BILIRUB SERPL-MCNC: 1.35 MG/DL (ref 0.2–1)
BUN SERPL-MCNC: 29 MG/DL (ref 5–25)
CALCIUM SERPL-MCNC: 9.3 MG/DL (ref 8.4–10.2)
CHLORIDE SERPL-SCNC: 101 MMOL/L (ref 96–108)
CHOLEST SERPL-MCNC: 152 MG/DL (ref ?–200)
CO2 SERPL-SCNC: 29 MMOL/L (ref 21–32)
CREAT SERPL-MCNC: 1.4 MG/DL (ref 0.6–1.3)
CREAT UR-MCNC: 229.2 MG/DL
EOSINOPHIL # BLD AUTO: 0.23 THOUSAND/ÂΜL (ref 0–0.61)
EOSINOPHIL NFR BLD AUTO: 3 % (ref 0–6)
ERYTHROCYTE [DISTWIDTH] IN BLOOD BY AUTOMATED COUNT: 12.5 % (ref 11.6–15.1)
EST. AVERAGE GLUCOSE BLD GHB EST-MCNC: 140 MG/DL
GFR SERPL CREATININE-BSD FRML MDRD: 55 ML/MIN/1.73SQ M
GLUCOSE P FAST SERPL-MCNC: 193 MG/DL (ref 65–99)
HBA1C MFR BLD: 6.5 %
HCT VFR BLD AUTO: 39.3 % (ref 36.5–49.3)
HDLC SERPL-MCNC: 29 MG/DL
HGB BLD-MCNC: 13.5 G/DL (ref 12–17)
IMM GRANULOCYTES # BLD AUTO: 0.02 THOUSAND/UL (ref 0–0.2)
IMM GRANULOCYTES NFR BLD AUTO: 0 % (ref 0–2)
LDLC SERPL CALC-MCNC: 53 MG/DL (ref 0–100)
LYMPHOCYTES # BLD AUTO: 2.04 THOUSANDS/ÂΜL (ref 0.6–4.47)
LYMPHOCYTES NFR BLD AUTO: 28 % (ref 14–44)
MCH RBC QN AUTO: 31.4 PG (ref 26.8–34.3)
MCHC RBC AUTO-ENTMCNC: 34.4 G/DL (ref 31.4–37.4)
MCV RBC AUTO: 91 FL (ref 82–98)
MICROALBUMIN UR-MCNC: 305.9 MG/L
MICROALBUMIN/CREAT 24H UR: 133 MG/G CREATININE (ref 0–30)
MONOCYTES # BLD AUTO: 0.64 THOUSAND/ÂΜL (ref 0.17–1.22)
MONOCYTES NFR BLD AUTO: 9 % (ref 4–12)
NEUTROPHILS # BLD AUTO: 4.39 THOUSANDS/ÂΜL (ref 1.85–7.62)
NEUTS SEG NFR BLD AUTO: 59 % (ref 43–75)
NRBC BLD AUTO-RTO: 0 /100 WBCS
PLATELET # BLD AUTO: 245 THOUSANDS/UL (ref 149–390)
PMV BLD AUTO: 11.1 FL (ref 8.9–12.7)
POTASSIUM SERPL-SCNC: 3.6 MMOL/L (ref 3.5–5.3)
PROT SERPL-MCNC: 7.1 G/DL (ref 6.4–8.4)
RBC # BLD AUTO: 4.3 MILLION/UL (ref 3.88–5.62)
SODIUM SERPL-SCNC: 141 MMOL/L (ref 135–147)
TRIGL SERPL-MCNC: 349 MG/DL (ref ?–150)
TSH SERPL DL<=0.05 MIU/L-ACNC: 0.57 UIU/ML (ref 0.45–4.5)
WBC # BLD AUTO: 7.38 THOUSAND/UL (ref 4.31–10.16)

## 2025-05-06 PROCEDURE — 80053 COMPREHEN METABOLIC PANEL: CPT

## 2025-05-06 PROCEDURE — 84443 ASSAY THYROID STIM HORMONE: CPT

## 2025-05-06 PROCEDURE — 85025 COMPLETE CBC W/AUTO DIFF WBC: CPT

## 2025-05-06 PROCEDURE — 36415 COLL VENOUS BLD VENIPUNCTURE: CPT

## 2025-05-06 PROCEDURE — 83036 HEMOGLOBIN GLYCOSYLATED A1C: CPT

## 2025-05-06 PROCEDURE — 82043 UR ALBUMIN QUANTITATIVE: CPT

## 2025-05-06 PROCEDURE — 80061 LIPID PANEL: CPT

## 2025-05-06 PROCEDURE — 82570 ASSAY OF URINE CREATININE: CPT

## 2025-05-07 ENCOUNTER — RESULTS FOLLOW-UP (OUTPATIENT)
Dept: ENDOCRINOLOGY | Facility: HOSPITAL | Age: 55
End: 2025-05-07

## 2025-05-07 RX ORDER — ALPRAZOLAM 0.25 MG
0.25 TABLET ORAL 3 TIMES DAILY PRN
Qty: 30 TABLET | Refills: 0 | Status: SHIPPED | OUTPATIENT
Start: 2025-05-07

## 2025-06-03 DIAGNOSIS — E11.42 DIABETIC PERIPHERAL NEUROPATHY (HCC): ICD-10-CM

## 2025-06-04 RX ORDER — GABAPENTIN 300 MG/1
300 CAPSULE ORAL 3 TIMES DAILY
Qty: 90 CAPSULE | Refills: 0 | Status: SHIPPED | OUTPATIENT
Start: 2025-06-04

## 2025-06-10 ENCOUNTER — OFFICE VISIT (OUTPATIENT)
Dept: ENDOCRINOLOGY | Facility: HOSPITAL | Age: 55
End: 2025-06-10
Payer: COMMERCIAL

## 2025-06-10 VITALS
BODY MASS INDEX: 32.14 KG/M2 | DIASTOLIC BLOOD PRESSURE: 86 MMHG | WEIGHT: 229.6 LBS | SYSTOLIC BLOOD PRESSURE: 140 MMHG | HEIGHT: 71 IN | HEART RATE: 90 BPM

## 2025-06-10 DIAGNOSIS — I10 PRIMARY HYPERTENSION: ICD-10-CM

## 2025-06-10 DIAGNOSIS — E78.5 HYPERLIPIDEMIA, UNSPECIFIED HYPERLIPIDEMIA TYPE: ICD-10-CM

## 2025-06-10 DIAGNOSIS — E11.65 TYPE 2 DIABETES MELLITUS WITH HYPERGLYCEMIA, WITHOUT LONG-TERM CURRENT USE OF INSULIN (HCC): Primary | ICD-10-CM

## 2025-06-10 PROCEDURE — 95251 CONT GLUC MNTR ANALYSIS I&R: CPT | Performed by: PHYSICIAN ASSISTANT

## 2025-06-10 PROCEDURE — 99214 OFFICE O/P EST MOD 30 MIN: CPT | Performed by: PHYSICIAN ASSISTANT

## 2025-06-10 NOTE — PROGRESS NOTES
Name: Zac Alvarenga      : 1970      MRN: [id number]  Encounter Provider: Magan Gross PA-C  Encounter Date: 6/10/2025   Encounter department: [facility]    No chief complaint on file.  :  Assessment & Plan  Type 2 diabetes mellitus with hyperglycemia, without long-term current use of insulin (HCC)  Most recent globin A1c has improved.  Glucose levels are doing excellent at this time.  However to assist in weight loss I am fine with increasing his Ozempic to 1 mg weekly.  He will continue with Farxiga 5 mg daily, glimepiride 2 mg twice a day, metformin 1000 mg twice a day.  If there is any concerns with hypoglycemia in the future we can discontinue other medications in the future.  Continue utilizing freestyle mandy to monitor glucose levels.  Contact the office with any concerns or questions.  Follow-up in about 4 months with labwork completed prior to visit.  Lab Results   Component Value Date    HGBA1C 6.5 (H) 2025       Orders:  •  Hemoglobin A1C; Future  •  Comprehensive metabolic panel; Future  •  semaglutide, 1 mg/dose, (Ozempic, 1 MG/DOSE,) 4 mg/3 mL injection pen; Inject 0.75 mL (1 mg total) under the skin every 7 days    Primary hypertension  Normotensive in the office.  Keeps remained stable with normal electrolytes.  Continue with amlodipine 5 mg daily, valsartan-HCTZ 320-12.5 mg daily.  Repeat CMP prior to next office visit.       Hyperlipidemia, unspecified hyperlipidemia type  Recent lipid panel did show elevated triglycerides, but rest of the panel was excellent.  Continue with simvastatin 40 mg daily.  We will continue to monitor over time.           History of Present Illness     Zac Alvarenga is a 55 y.o. male with type 2 diabetes with neuropathy for 15 years, hypertension, hyperlipidemia for follow-up.  He 1st started on metformin and eventually this was switched to Janumet.  Due to insurance reason he was switched to  Jentadueto but this was still too expensive.  He had tried Invokana in the past but it caused nausea.  He is still having higher blood sugars in the morning so was concerned about his diabetes especially in light of his family history that is strong with diabetes causing amputations and early mortality.  He is on oral agents at home and is currently taking Farxiga 5 mg daily, metformin 1000 mg twice a day, Ozempic 0.5 mg weekly, and glimepiride 2 to 4 mg 1-2 times a day based on glucose levels.  He denies any polyuria, polydipsia, polyphagia, and blurry vision.  He has numbness and tingling of the feet unchanged.  He denies chest pain or shortness of breath.  He denies retinopathy, heart attack, stroke and claudication but does admit to neuropathy and nephropathy.  Continues to make lifestyle changes to help improve glucose levels, and also to lose weight.  Has not had any significant side effects with starting Ozempic.  Weight has recently stalled but has lost about 13 pounds.     He did follow-up with [facility] 20[facility].       He has a strong family history of diabetes in both paternal and maternal side of the family.  Father  early at 63 with amputations from diabetes and paternal aunt  in her 60s with leg amputations.     Hypoglycemic episodes: Occasional.  No specific pattern seems to happen at any time during the day.  Typically occurs after taking glimepiride if glucose levels are running high.  H/o of hypoglycemia causing hospitalization or intervention such as glucagon injection  or ambulance call  No.  Hypoglycemia symptoms: jitteriness and nausea.  Treatment of hypoglycemia: will drink OJ or eat sugar.  Glucagon:No.  Medic alert tag: recommended,Yes.      The patient's last eye exam was in 2023 with no retinopathy.  The patient's last foot exam was May 2024.  He does not see Podiatry.  Most recent hemoglobin A1c completed on May 6, 2025 was 6.5.     Blood  Sugar/Glucometer/Pump/CGM review: Downloaded Dexcom from May 27 through June 9, 2025 reveals an average glucose level of 139 with a glucose variably of 21.5%.  He is in target range 90% of the time, and above target range 10% time.  Glucose in general remain relatively stable throughout the day.  Will typically increase with meals, but decreased shortly after.     He has hyperlipidemia and takes simvastatin 40 mg daily.  He has hypertension and takes amlodipine 5 mg daily and valsartan/HCTZ 320/12.5 mg daily.  Denies any vision changes, headaches, chest pain, or strokelike symptoms.  Did have side effects with higher dose of amlodipine.    Current diabetic regimen:   Farxiga 5 mg daily, metformin 1000 mg twice a day, Ozempic 0.5 mg weekly, and glimepiride 2 to 4 mg 1-2 times a day based on glucose levels    Review of Systems   Constitutional:  Negative for activity change, appetite change, fatigue and unexpected weight change.   HENT:  Negative for trouble swallowing.    Eyes:  Negative for visual disturbance.   Respiratory:  Positive for apnea. Negative for chest tightness and shortness of breath.    Cardiovascular:  Negative for chest pain, palpitations and leg swelling.   Gastrointestinal:  Negative for abdominal pain, diarrhea, nausea and vomiting.   Endocrine: Negative for cold intolerance, heat intolerance, polydipsia, polyphagia and polyuria.        Occasional nocturia   Genitourinary:  Negative for frequency.   Skin:  Negative for rash and wound.   Neurological:  Positive for numbness. Negative for dizziness, weakness, light-headedness and headaches.   Psychiatric/Behavioral:  Negative for dysphoric mood and sleep disturbance. The patient is not nervous/anxious.     as per HPI    Medical History Reviewed by provider this encounter:     .  Medications Ordered Prior to Encounter[1]   Social History[2]     Medical History Reviewed by provider this encounter:     .    Objective   /86 (BP Location: Left  "arm, Patient Position: Sitting, Cuff Size: Adult)   Pulse 90   Ht 5' 11\" (1.803 m)   Wt 104 kg (229 lb 9.6 oz)   BMI 32.02 kg/m²      Body mass index is 32.02 kg/m².  Wt Readings from Last 3 Encounters:   06/10/25 104 kg (229 lb 9.6 oz)   02/05/25 109 kg (241 lb)   01/28/25 110 kg (242 lb 3.2 oz)     Physical Exam  Vitals and nursing note reviewed.   Constitutional:       General: He is not in acute distress.     Appearance: Normal appearance. He is well-developed. He is not diaphoretic.     Eyes:      General: No scleral icterus.     Extraocular Movements: Extraocular movements intact.      Conjunctiva/sclera: Conjunctivae normal.      Pupils: Pupils are equal, round, and reactive to light.       Cardiovascular:      Rate and Rhythm: Normal rate and regular rhythm.      Pulses: Normal pulses.      Heart sounds: Normal heart sounds. No murmur heard.     No friction rub. No gallop.   Pulmonary:      Effort: Pulmonary effort is normal. No tachypnea, bradypnea or respiratory distress.      Breath sounds: Normal breath sounds. No wheezing.     Musculoskeletal:      Cervical back: Normal range of motion.      Right lower leg: No edema.      Left lower leg: No edema.   Lymphadenopathy:      Cervical: No cervical adenopathy.     Skin:     General: Skin is warm and dry.     Neurological:      Mental Status: He is alert and oriented to person, place, and time. Mental status is at baseline. He is not disoriented.      Motor: No abnormal muscle tone.      Gait: Gait normal.      Deep Tendon Reflexes: Reflexes are normal and symmetric.     Psychiatric:         Mood and Affect: Mood normal.         Behavior: Behavior normal.         Thought Content: Thought content normal.       Last Eye Exam: 01/25/2023  Last Foot Exam: 05/16/2024  Health Maintenance   Topic Date Due   • Diabetic Foot Exam  05/16/2025   • Diabetic Eye Exam  01/28/2026 (Originally 1/25/2024)         Labs: I have reviewed pertinent labs including:   Lab " Results   Component Value Date    HGBA1C 6.5 (H) 05/06/2025    HGBA1C 7.2 (H) 11/22/2024    HGBA1C 6.9 (H) 08/09/2024      Lab Results   Component Value Date    CREATININE 1.40 (H) 05/06/2025    CREATININE 1.24 11/22/2024    CREATININE 1.42 (H) 08/09/2024    BUN 29 (H) 05/06/2025     09/02/2016    K 3.6 05/06/2025     05/06/2025    CO2 29 05/06/2025      eGFR   Date Value Ref Range Status   05/06/2025 55 ml/min/1.73sq m Final      Cholesterol   Date Value Ref Range Status   09/02/2016 155 125 - 200 mg/dL Final     HDL   Date Value Ref Range Status   05/20/2019 29 (L) >39 mg/dL Final     HDL, Direct   Date Value Ref Range Status   05/06/2025 29 (L) >=40 mg/dL Final     Triglycerides   Date Value Ref Range Status   05/06/2025 349 (H) See Comment mg/dL Final     Comment:     Triglyceride:     0-9Y            <75mg/dL     10Y-17Y         <90 mg/dL       >=18Y     Normal          <150 mg/dL     Borderline High 150-199 mg/dL     High            200-499 mg/dL        Very High       >499 mg/dL    Specimen collection should occur prior to Metamizole administration due to the potential for falsely depressed results.   05/20/2019 464 (H) 0 - 149 mg/dL Final     T. Chol/HDL Ratio   Date Value Ref Range Status   05/20/2019 6.2 (H) 0.0 - 5.0 ratio Final     Comment:                                       T. Chol/HDL Ratio                                              Men  Women                                1/2 Avg.Risk  3.4    3.3                                    Avg.Risk  5.0    4.4                                 2X Avg.Risk  9.6    7.1                                 3X Avg.Risk 23.4   11.0        ALT   Date Value Ref Range Status   05/06/2025 31 7 - 52 U/L Final     Comment:     Specimen collection should occur prior to Sulfasalazine administration due to the potential for falsely depressed results.    05/20/2019 27 0 - 44 IU/L Final     AST   Date Value Ref Range Status   05/06/2025 27 13 - 39 U/L Final  "  05/20/2019 27 0 - 40 IU/L Final     Alkaline Phosphatase   Date Value Ref Range Status   05/06/2025 66 34 - 104 U/L Final   11/15/2018 83 40 - 115 U/L Final     Total Bilirubin   Date Value Ref Range Status   09/02/2016 1.3 (H) 0.2 - 1.2 mg/dL Final      Lab Results   Component Value Date    GWT8QOCMCUGL 0.567 05/06/2025      No results found for: \"FREET4\", \"T3FREE\", \"TSI\", \"ANTITPOAB\"     Patient Instructions   Monitor diet and maintain physical activity.    Continue Farxiga 5 mg daily metformin 1000 mg twice a day, glimepiride 2 g twice a day.    Increase Ozempic to 1 mg weekly.    Continue to use Freestyle Nazanin.    Call the office with any concerns or questions.    Follow-up in 3 months with lab work completed prior to visit.    Discussed with the patient and all questioned fully answered. He will call me if any problems arise.             [1]  Current Outpatient Medications on File Prior to Visit   Medication Sig Dispense Refill   • ALPRAZolam (XANAX) 0.25 mg tablet TAKE 1 TABLET (0.25 MG TOTAL) BY MOUTH 3 (THREE) TIMES A DAY AS NEEDED FOR ANXIETY. 30 tablet 0   • amLODIPine (NORVASC) 5 mg tablet TAKE 1 TABLET (5 MG TOTAL) BY MOUTH IN THE MORNING 90 tablet 1   • aspirin 81 MG tablet Take 81 mg by mouth in the morning.     • Blood Glucose Monitoring Suppl (ACCU-CHEK JESSY CONNECT) w/Device KIT Use     • Cholecalciferol (VITAMIN D3) 2000 UNITS TABS Take 1 tablet by mouth in the morning.     • Continuous Glucose Sensor (FreeStyle Nazanin 2 Plus Sensor) MISC Use 1 each continuous Apply every 15 days  to test sugars 4-10 times a day. 6 each 3   • dapagliflozin 5 MG TABS Take 1 tablet (5 mg total) by mouth daily 90 tablet 3   • gabapentin (NEURONTIN) 300 mg capsule Take 1 capsule (300 mg total) by mouth 3 (three) times a day 90 capsule 0   • glimepiride (AMARYL) 2 mg tablet TAKE 1 TABLET BY MOUTH 2 TIMES A DAY. 180 tablet 1   • metFORMIN (GLUCOPHAGE) 1000 MG tablet Take 1 tablet (1,000 mg total) by mouth 2 (two) " times a day with meals 180 tablet 3   • Omega-3 Fatty Acids (FISH OIL) 1,000 mg Take 1 capsule by mouth in the morning.     • sertraline (ZOLOFT) 100 mg tablet TAKE 2 TABLETS BY MOUTH DAILY 180 tablet 1   • sildenafil (REVATIO) 20 mg tablet TAKE 3 TABLETS BY MOUTH DAILY AS NEEDED FOR ERECTILE DYSFUNCTION. 90 tablet 0   • simvastatin (ZOCOR) 40 mg tablet TAKE 1 TABLET BY MOUTH EVERYDAY AT BEDTIME 90 tablet 1   • tamsulosin (FLOMAX) 0.4 mg Take 1 capsule (0.4 mg total) by mouth daily with dinner 90 capsule 3   • valsartan-hydrochlorothiazide (DIOVAN-HCT) 320-12.5 MG per tablet TAKE 1 TABLET BY MOUTH EVERY DAY 90 tablet 1   • [DISCONTINUED] semaglutide, 0.25 or 0.5 mg/dose, (Ozempic, 0.25 or 0.5 MG/DOSE,) 2 mg/3 mL injection pen Inject 0.75 mL (0.5 mg total) under the skin every 7 days 3 mL 6   • methocarbamol (ROBAXIN) 500 mg tablet Take 1 tablet (500 mg total) by mouth 3 (three) times a day (Patient not taking: Reported on 5/16/2024) 20 tablet 0     No current facility-administered medications on file prior to visit.   [2]  Social History  Tobacco Use   • Smoking status: Never   • Smokeless tobacco: Never   Vaping Use   • Vaping status: Never Used   Substance and Sexual Activity   • Alcohol use: Yes     Alcohol/week: 5.0 standard drinks of alcohol     Types: 5 Standard drinks or equivalent per week     Comment: SOCIAL- 3-4 drinks a week with a meal   • Drug use: No   • Sexual activity: Yes     Partners: Female     Birth control/protection: None

## 2025-06-10 NOTE — PATIENT INSTRUCTIONS
Monitor diet and maintain physical activity.    Continue Farxiga 5 mg daily metformin 1000 mg twice a day, glimepiride 2 g twice a day.    Increase Ozempic to 1 mg weekly.    Continue to use Freestyle Nazanin.    Call the office with any concerns or questions.    Follow-up in 3 months with lab work completed prior to visit.

## 2025-06-10 NOTE — ASSESSMENT & PLAN NOTE
Recent lipid panel did show elevated triglycerides, but rest of the panel was excellent.  Continue with simvastatin 40 mg daily.  We will continue to monitor over time.

## 2025-06-10 NOTE — ASSESSMENT & PLAN NOTE
Most recent globin A1c has improved.  Glucose levels are doing excellent at this time.  However to assist in weight loss I am fine with increasing his Ozempic to 1 mg weekly.  He will continue with Farxiga 5 mg daily, glimepiride 2 mg twice a day, metformin 1000 mg twice a day.  If there is any concerns with hypoglycemia in the future we can discontinue other medications in the future.  Continue utilizing freestyle mandy to monitor glucose levels.  Contact the office with any concerns or questions.  Follow-up in about 4 months with labwork completed prior to visit.  Lab Results   Component Value Date    HGBA1C 6.5 (H) 05/06/2025       Orders:  •  Hemoglobin A1C; Future  •  Comprehensive metabolic panel; Future  •  semaglutide, 1 mg/dose, (Ozempic, 1 MG/DOSE,) 4 mg/3 mL injection pen; Inject 0.75 mL (1 mg total) under the skin every 7 days

## 2025-06-10 NOTE — ASSESSMENT & PLAN NOTE
Normotensive in the office.  Keeps remained stable with normal electrolytes.  Continue with amlodipine 5 mg daily, valsartan-HCTZ 320-12.5 mg daily.  Repeat CMP prior to next office visit.

## 2025-07-01 ENCOUNTER — VBI (OUTPATIENT)
Dept: ADMINISTRATIVE | Facility: OTHER | Age: 55
End: 2025-07-01

## 2025-07-01 NOTE — TELEPHONE ENCOUNTER
07/01/25 8:52 AM     Chart reviewed for   [facility] - Eye Exam    ; nothing is submitted to the patient's insurance at this time.     KIKE VARGAS MA   [facility]

## 2025-07-10 ENCOUNTER — RA CDI HCC (OUTPATIENT)
Dept: OTHER | Facility: HOSPITAL | Age: 55
End: 2025-07-10

## 2025-07-16 ENCOUNTER — OFFICE VISIT (OUTPATIENT)
Dept: FAMILY MEDICINE CLINIC | Facility: CLINIC | Age: 55
End: 2025-07-16
Payer: COMMERCIAL

## 2025-07-16 VITALS
DIASTOLIC BLOOD PRESSURE: 64 MMHG | TEMPERATURE: 97.9 F | SYSTOLIC BLOOD PRESSURE: 118 MMHG | WEIGHT: 228 LBS | RESPIRATION RATE: 16 BRPM | BODY MASS INDEX: 31.8 KG/M2 | HEART RATE: 88 BPM | OXYGEN SATURATION: 95 %

## 2025-07-16 DIAGNOSIS — F41.9 ANXIETY: ICD-10-CM

## 2025-07-16 DIAGNOSIS — F32.2 SEVERE MAJOR DEPRESSIVE DISORDER (HCC): ICD-10-CM

## 2025-07-16 DIAGNOSIS — E66.811 CLASS 1 OBESITY WITH SERIOUS COMORBIDITY AND BODY MASS INDEX (BMI) OF 31.0 TO 31.9 IN ADULT, UNSPECIFIED OBESITY TYPE: ICD-10-CM

## 2025-07-16 DIAGNOSIS — E55.9 VITAMIN D DEFICIENCY: ICD-10-CM

## 2025-07-16 DIAGNOSIS — E11.69 TYPE 2 DIABETES MELLITUS WITH OTHER SPECIFIED COMPLICATION, WITHOUT LONG-TERM CURRENT USE OF INSULIN (HCC): ICD-10-CM

## 2025-07-16 DIAGNOSIS — Z12.11 COLON CANCER SCREENING: ICD-10-CM

## 2025-07-16 DIAGNOSIS — E11.65 TYPE 2 DIABETES MELLITUS WITH HYPERGLYCEMIA, WITHOUT LONG-TERM CURRENT USE OF INSULIN (HCC): Primary | ICD-10-CM

## 2025-07-16 DIAGNOSIS — E78.5 HYPERLIPIDEMIA, UNSPECIFIED HYPERLIPIDEMIA TYPE: ICD-10-CM

## 2025-07-16 DIAGNOSIS — I10 PRIMARY HYPERTENSION: ICD-10-CM

## 2025-07-16 DIAGNOSIS — Z12.5 SCREENING PSA (PROSTATE SPECIFIC ANTIGEN): ICD-10-CM

## 2025-07-16 PROBLEM — E11.9 DIABETES MELLITUS (HCC): Status: ACTIVE | Noted: 2025-07-16

## 2025-07-16 LAB
LEFT EYE DIABETIC RETINOPATHY: NORMAL
LEFT EYE IMAGE QUALITY: NORMAL
LEFT EYE MACULAR EDEMA: NORMAL
LEFT EYE OTHER RETINOPATHY: NORMAL
RIGHT EYE DIABETIC RETINOPATHY: NORMAL
RIGHT EYE IMAGE QUALITY: NORMAL
RIGHT EYE MACULAR EDEMA: NORMAL
RIGHT EYE OTHER RETINOPATHY: NORMAL
SEVERITY (EYE EXAM): NORMAL

## 2025-07-16 PROCEDURE — 99214 OFFICE O/P EST MOD 30 MIN: CPT | Performed by: FAMILY MEDICINE

## 2025-07-16 RX ORDER — GLIMEPIRIDE 2 MG/1
2 TABLET ORAL 2 TIMES DAILY PRN
Qty: 180 TABLET | Refills: 1 | Status: SHIPPED | OUTPATIENT
Start: 2025-07-16

## 2025-07-16 NOTE — ASSESSMENT & PLAN NOTE
Lab Results   Component Value Date    HGBA1C 6.5 (H) 05/06/2025       Orders:    glimepiride (AMARYL) 2 mg tablet; Take 1 tablet (2 mg total) by mouth 2 (two) times a day as needed (elevated BS)

## 2025-07-16 NOTE — ASSESSMENT & PLAN NOTE
Cholesterol well-controlled on simvastatin 40 mg daily although triglycerides remain elevated.  Continue present treatment including fish oil and following a low-fat and low-cholesterol diet.

## 2025-07-16 NOTE — ASSESSMENT & PLAN NOTE
{If prescribing weight loss medication, click here to fill out prior auth smartform and then hit F2 with this smartlist to insert prior auth documentation (Optional):[id number]}  Obesity significant improved on Ozempic.  Continue present treatment as well as diet and exercise.  Continued weight loss encouraged.

## 2025-07-16 NOTE — ASSESSMENT & PLAN NOTE
Better control improved and at goal with recent hemoglobin A1c at 6.5.  Patient to continue present treatment and follow a low sugar and low carbohydrate diet and regular aerobic exercise walking 150 minutes/week.  Continue home glucose monitoring.  Follow-up with [facility] endocrinology and labs as per endocrinology.  Lab Results   Component Value Date    HGBA1C 6.5 (H) 05/06/2025       Orders:    [facility] Diabetic eye exam

## 2025-07-16 NOTE — PROGRESS NOTES
:  Assessment & Plan  Type 2 diabetes mellitus with hyperglycemia, without long-term current use of insulin (HCC)  Better control improved and at goal with recent hemoglobin A1c at 6.5.  Patient to continue present treatment and follow a low sugar and low carbohydrate diet and regular aerobic exercise walking 150 minutes/week.  Continue home glucose monitoring.  Follow-up with [facility] endocrinology and labs as per endocrinology.  Lab Results   Component Value Date    HGBA1C 6.5 (H) 05/06/2025       Orders:    IRIS Diabetic eye exam    Type 2 diabetes mellitus with other specified complication, without long-term current use of insulin (HCC)    Lab Results   Component Value Date    HGBA1C 6.5 (H) 05/06/2025       Orders:    glimepiride (AMARYL) 2 mg tablet; Take 1 tablet (2 mg total) by mouth 2 (two) times a day as needed (elevated BS)    Primary hypertension  Blood pressure well-controlled on valsartan HCTZ and amlodipine.  Continue present treatment follow low-salt diet and regular aerobic exercise.       Hyperlipidemia, unspecified hyperlipidemia type  Cholesterol well-controlled on simvastatin 40 mg daily although triglycerides remain elevated.  Continue present treatment including fish oil and following a low-fat and low-cholesterol diet.       Severe major depressive disorder (HCC)  Clinically stable on sertraline 100 mg daily.  Continue present treatment.         Anxiety  Symptoms controlled with alprazolam as needed only.       Class 1 obesity with serious comorbidity and body mass index (BMI) of 31.0 to 31.9 in adult, unspecified obesity type    Obesity significant improved on Ozempic.  Continue present treatment as well as diet and exercise.  Continued weight loss encouraged.       Vitamin D deficiency  Continue vitamin D supplement.       Screening PSA (prostate specific antigen)  Patient to obtain PSA after 8/9/2025 with labs per endocrinology.  Orders:    PSA, Total Screen    Colon cancer  screening  Patient agrees to schedule follow-up colonoscopy.  Orders:    Ambulatory Referral to Gastroenterology; Future        History of Present Illness     Zac Alvarenga is a 55 y.o. male   Patient is here for follow-up appoint for chronic conditions and reviewed recent labs.  Patient is feeling well overall and recently moved to [place] although spends 1 week a month working locally.  Patient has been walking regularly 2 to 3 miles daily.  Patient follows with [facility] endocrinology regularly and recent hemoglobin A1c was 6.5.  Patient is due for diabetic eye exam and foot exam.  Patient is overdue for colonoscopy last done in 2019 and recommended repeat in 5 years and agrees to schedule.  Patient not using CPAP as he cannot tolerate it causing panic attacks.    Hypertension  This is a chronic problem. The problem is controlled. Associated symptoms include anxiety. Pertinent negatives include no blurred vision, chest pain, headaches, orthopnea, palpitations, peripheral edema, PND or shortness of breath. Risk factors for coronary artery disease include family history, dyslipidemia, diabetes mellitus, male gender and obesity. Past treatments include angiotensin blockers, calcium channel blockers and diuretics. The current treatment provides significant improvement. There are no compliance problems.  There is no history of CAD/MI or CVA.     Review of Systems   Eyes:  Negative for blurred vision.   Respiratory:  Negative for shortness of breath.    Cardiovascular:  Negative for chest pain, palpitations, orthopnea and PND.   Neurological:  Negative for headaches.     Objective   /64   Pulse 88   Temp 97.9 °F (36.6 °C)   Resp 16   Wt 103 kg (228 lb)   SpO2 95%   BMI 31.80 kg/m²      Physical Exam  Constitutional:       General: He is not in acute distress.     Appearance: Normal appearance.   HENT:      Head: Normocephalic.      Mouth/Throat:      Mouth: Mucous membranes are moist.     Eyes:       General: No scleral icterus.     Conjunctiva/sclera: Conjunctivae normal.     Neck:      Vascular: No carotid bruit.     Cardiovascular:      Rate and Rhythm: Normal rate and regular rhythm.      Pulses: no weak pulses.           Dorsalis pedis pulses are 2+ on the right side and 2+ on the left side.        Posterior tibial pulses are 2+ on the right side and 2+ on the left side.   Pulmonary:      Effort: Pulmonary effort is normal.      Breath sounds: Normal breath sounds.   Abdominal:      Palpations: Abdomen is soft.      Tenderness: There is no abdominal tenderness.     Musculoskeletal:      Cervical back: Neck supple.      Right lower leg: No edema.      Left lower leg: No edema.        Feet:    Feet:      Right foot:      Skin integrity: No ulcer, skin breakdown, erythema, warmth, callus or dry skin.      Left foot:      Skin integrity: No ulcer, skin breakdown, erythema, warmth, callus or dry skin.   Lymphadenopathy:      Cervical: No cervical adenopathy.     Skin:     General: Skin is warm and dry.     Neurological:      General: No focal deficit present.      Mental Status: He is alert and oriented to person, place, and time.     Psychiatric:         Mood and Affect: Mood normal.         Behavior: Behavior normal.         Thought Content: Thought content normal.         Judgment: Judgment normal.         Diabetic Foot Exam    Patient's shoes and socks removed.    Right Foot/Ankle   Right Foot Inspection  Skin Exam: skin normal and skin intact. No dry skin, no warmth, no callus, no erythema, no maceration, no abnormal color, no pre-ulcer, no ulcer and no callus.     Toe Exam: ROM and strength within normal limits.     Sensory   Monofilament testing: intact    Vascular  The right DP pulse is 2+. The right PT pulse is 2+.     Left Foot/Ankle  Left Foot Inspection  Skin Exam: skin normal and skin intact. No dry skin, no warmth, no erythema, no maceration, normal color, no pre-ulcer, no ulcer and no callus.      Toe Exam: ROM and strength within normal limits.     Sensory   Monofilament testing: intact    Vascular  The left DP pulse is 2+. The left PT pulse is 2+.     Assign Risk Category  No deformity present  No loss of protective sensation  No weak pulses  Risk: 0

## 2025-07-16 NOTE — ASSESSMENT & PLAN NOTE
Blood pressure well-controlled on valsartan HCTZ and amlodipine.  Continue present treatment follow low-salt diet and regular aerobic exercise.

## (undated) DEVICE — SHEATH URETERAL ACCESS 12/14FR 35CM PROXIS

## (undated) DEVICE — CHLORHEXIDINE 4PCT 4 OZ

## (undated) DEVICE — GLOVE SRG BIOGEL 7

## (undated) DEVICE — INVIEW CLEAR LEGGINGS: Brand: CONVERTORS

## (undated) DEVICE — SCD SEQUENTIAL COMPRESSION COMFORT SLEEVE MEDIUM KNEE LENGTH: Brand: KENDALL SCD

## (undated) DEVICE — CHEMOTHERAPY CONTAINER,HINGED LID, YELLOW: Brand: SHARPSAFETY

## (undated) DEVICE — CATHETER PLUG WITH CAP: Brand: DOVER

## (undated) DEVICE — UROCATCH BAG

## (undated) DEVICE — 3M™ TEGADERM™ TRANSPARENT FILM DRESSING FRAME STYLE, 1624W, 2-3/8 IN X 2-3/4 IN (6 CM X 7 CM), 100/CT 4CT/CASE: Brand: 3M™ TEGADERM™

## (undated) DEVICE — PACK TUR

## (undated) DEVICE — GUIDEWIRE STRGHT TIP 0.035 IN  SOLO PLUS

## (undated) DEVICE — PREMIUM DRY TRAY LF: Brand: MEDLINE INDUSTRIES, INC.